# Patient Record
Sex: MALE | Race: WHITE | Employment: OTHER | URBAN - METROPOLITAN AREA
[De-identification: names, ages, dates, MRNs, and addresses within clinical notes are randomized per-mention and may not be internally consistent; named-entity substitution may affect disease eponyms.]

---

## 2018-04-30 ENCOUNTER — HOSPITAL ENCOUNTER (OUTPATIENT)
Age: 68
Discharge: HOME | End: 2018-04-30
Payer: MEDICARE

## 2018-04-30 LAB
ALBUMIN SERPL-MCNC: 4.7 G/DL (ref 3.5–5.2)
ALP BLD-CCNC: 52 U/L (ref 40–129)
ALT SERPL-CCNC: 41 U/L (ref 0–40)
ANION GAP SERPL CALCULATED.3IONS-SCNC: 18 MMOL/L (ref 7–16)
AST SERPL-CCNC: 30 U/L (ref 0–39)
BACTERIA: NORMAL /HPF
BASOPHILS ABSOLUTE: 0.06 E9/L (ref 0–0.2)
BASOPHILS RELATIVE PERCENT: 1.3 % (ref 0–2)
BILIRUB SERPL-MCNC: 0.8 MG/DL (ref 0–1.2)
BILIRUBIN URINE: NEGATIVE
BLOOD, URINE: ABNORMAL
BUN BLDV-MCNC: 17 MG/DL (ref 8–23)
CALCIUM SERPL-MCNC: 9.8 MG/DL (ref 8.6–10.2)
CHLORIDE BLD-SCNC: 102 MMOL/L (ref 98–107)
CHOLESTEROL, TOTAL: 241 MG/DL (ref 0–199)
CLARITY: CLEAR
CO2: 23 MMOL/L (ref 22–29)
COLOR: YELLOW
CREAT SERPL-MCNC: 1.4 MG/DL (ref 0.7–1.2)
CREATININE URINE: 151 MG/DL (ref 40–278)
EOSINOPHILS ABSOLUTE: 0.19 E9/L (ref 0.05–0.5)
EOSINOPHILS RELATIVE PERCENT: 4 % (ref 0–6)
GFR AFRICAN AMERICAN: >60
GFR NON-AFRICAN AMERICAN: 51 ML/MIN/1.73
GLUCOSE BLD-MCNC: 106 MG/DL (ref 74–109)
GLUCOSE URINE: NEGATIVE MG/DL
HBA1C MFR BLD: 5.8 % (ref 4.8–5.9)
HCT VFR BLD CALC: 51.9 % (ref 37–54)
HDLC SERPL-MCNC: 30 MG/DL
HEMOGLOBIN: 17.2 G/DL (ref 12.5–16.5)
IMMATURE GRANULOCYTES #: 0.01 E9/L
IMMATURE GRANULOCYTES %: 0.2 % (ref 0–5)
KETONES, URINE: NEGATIVE MG/DL
LDL CHOLESTEROL CALCULATED: 152 MG/DL (ref 0–99)
LEUKOCYTE ESTERASE, URINE: NEGATIVE
LYMPHOCYTES ABSOLUTE: 1.56 E9/L (ref 1.5–4)
LYMPHOCYTES RELATIVE PERCENT: 32.7 % (ref 20–42)
MCH RBC QN AUTO: 28.7 PG (ref 26–35)
MCHC RBC AUTO-ENTMCNC: 33.1 % (ref 32–34.5)
MCV RBC AUTO: 86.6 FL (ref 80–99.9)
MICROALBUMIN UR-MCNC: <12 MG/L
MICROALBUMIN/CREAT UR-RTO: ABNORMAL (ref 0–30)
MONOCYTES ABSOLUTE: 0.48 E9/L (ref 0.1–0.95)
MONOCYTES RELATIVE PERCENT: 10.1 % (ref 2–12)
NEUTROPHILS ABSOLUTE: 2.47 E9/L (ref 1.8–7.3)
NEUTROPHILS RELATIVE PERCENT: 51.7 % (ref 43–80)
NITRITE, URINE: NEGATIVE
PDW BLD-RTO: 13.7 FL (ref 11.5–15)
PH UA: 5.5 (ref 5–9)
PLATELET # BLD: 153 E9/L (ref 130–450)
PMV BLD AUTO: 9.2 FL (ref 7–12)
POTASSIUM SERPL-SCNC: 4.7 MMOL/L (ref 3.5–5)
PROSTATE SPECIFIC ANTIGEN: 2.6 NG/ML (ref 0–4)
PROTEIN UA: NEGATIVE MG/DL
RBC # BLD: 5.99 E12/L (ref 3.8–5.8)
RBC UA: NORMAL /HPF (ref 0–2)
RENAL EPITHELIAL, UA: NORMAL /HPF
SODIUM BLD-SCNC: 143 MMOL/L (ref 132–146)
SPECIFIC GRAVITY UA: 1.02 (ref 1–1.03)
TOTAL CK: 155 U/L (ref 20–200)
TOTAL PROTEIN: 8 G/DL (ref 6.4–8.3)
TRIGL SERPL-MCNC: 296 MG/DL (ref 0–149)
URIC ACID, SERUM: 11.2 MG/DL (ref 3.4–7)
UROBILINOGEN, URINE: 0.2 E.U./DL
VITAMIN D 25-HYDROXY: 21 NG/ML (ref 30–100)
VLDLC SERPL CALC-MCNC: 59 MG/DL
WBC # BLD: 4.8 E9/L (ref 4.5–11.5)
WBC UA: NORMAL /HPF (ref 0–5)

## 2018-04-30 PROCEDURE — 83036 HEMOGLOBIN GLYCOSYLATED A1C: CPT

## 2018-04-30 PROCEDURE — 82570 ASSAY OF URINE CREATININE: CPT

## 2018-04-30 PROCEDURE — 81001 URINALYSIS AUTO W/SCOPE: CPT

## 2018-04-30 PROCEDURE — 82306 VITAMIN D 25 HYDROXY: CPT

## 2018-04-30 PROCEDURE — 80053 COMPREHEN METABOLIC PANEL: CPT

## 2018-04-30 PROCEDURE — 36415 COLL VENOUS BLD VENIPUNCTURE: CPT

## 2018-04-30 PROCEDURE — 85025 COMPLETE CBC W/AUTO DIFF WBC: CPT

## 2018-04-30 PROCEDURE — 82550 ASSAY OF CK (CPK): CPT

## 2018-04-30 PROCEDURE — 82044 UR ALBUMIN SEMIQUANTITATIVE: CPT

## 2018-04-30 PROCEDURE — 80061 LIPID PANEL: CPT

## 2018-04-30 PROCEDURE — 84153 ASSAY OF PSA TOTAL: CPT

## 2018-04-30 PROCEDURE — 84550 ASSAY OF BLOOD/URIC ACID: CPT

## 2018-06-11 ENCOUNTER — HOSPITAL ENCOUNTER (OUTPATIENT)
Age: 68
Discharge: HOME | End: 2018-06-11
Payer: MEDICARE

## 2018-06-11 LAB
ALBUMIN SERPL-MCNC: 4.4 G/DL (ref 3.5–5.2)
ALP BLD-CCNC: 56 U/L (ref 40–129)
ALT SERPL-CCNC: 41 U/L (ref 0–40)
ANION GAP SERPL CALCULATED.3IONS-SCNC: 14 MMOL/L (ref 7–16)
AST SERPL-CCNC: 31 U/L (ref 0–39)
BILIRUB SERPL-MCNC: 0.6 MG/DL (ref 0–1.2)
BUN BLDV-MCNC: 15 MG/DL (ref 8–23)
CALCIUM SERPL-MCNC: 9.8 MG/DL (ref 8.6–10.2)
CHLORIDE BLD-SCNC: 105 MMOL/L (ref 98–107)
CHOLESTEROL, TOTAL: 160 MG/DL (ref 0–199)
CO2: 24 MMOL/L (ref 22–29)
CREAT SERPL-MCNC: 1.2 MG/DL (ref 0.7–1.2)
GFR AFRICAN AMERICAN: >60
GFR NON-AFRICAN AMERICAN: >60 ML/MIN/1.73
GLUCOSE BLD-MCNC: 129 MG/DL (ref 74–109)
HDLC SERPL-MCNC: 38 MG/DL
LDL CHOLESTEROL CALCULATED: 88 MG/DL (ref 0–99)
POTASSIUM SERPL-SCNC: 4.3 MMOL/L (ref 3.5–5)
SODIUM BLD-SCNC: 143 MMOL/L (ref 132–146)
TOTAL CK: 122 U/L (ref 20–200)
TOTAL PROTEIN: 7.5 G/DL (ref 6.4–8.3)
TRIGL SERPL-MCNC: 168 MG/DL (ref 0–149)
VLDLC SERPL CALC-MCNC: 34 MG/DL

## 2018-06-11 PROCEDURE — 88112 CYTOPATH CELL ENHANCE TECH: CPT

## 2018-06-11 PROCEDURE — 80053 COMPREHEN METABOLIC PANEL: CPT

## 2018-06-11 PROCEDURE — 36415 COLL VENOUS BLD VENIPUNCTURE: CPT

## 2018-06-11 PROCEDURE — 86803 HEPATITIS C AB TEST: CPT

## 2018-06-11 PROCEDURE — 82550 ASSAY OF CK (CPK): CPT

## 2018-06-11 PROCEDURE — 87522 HEPATITIS C REVRS TRNSCRPJ: CPT

## 2018-06-11 PROCEDURE — 80061 LIPID PANEL: CPT

## 2018-06-12 LAB — HEPATITIS C ANTIBODY INTERPRETATION: NORMAL

## 2018-06-13 ENCOUNTER — HOSPITAL ENCOUNTER (OUTPATIENT)
Age: 68
Discharge: HOME | End: 2018-06-15
Payer: MEDICARE

## 2018-06-13 PROCEDURE — 88112 CYTOPATH CELL ENHANCE TECH: CPT

## 2018-06-14 LAB
EER HCV RNA QNT PCR: NORMAL
HCV RNA QNT REAL-TIME PCR INTERP: NOT DETECTED
HCV RNA, QUANTITATIVE REAL TIME PCR: <1.2 LOG IU
HEPATITIS C RNA PCR QUANT: <15 IU/ML

## 2018-11-13 ENCOUNTER — TELEPHONE (OUTPATIENT)
Dept: PHARMACY | Facility: CLINIC | Age: 68
End: 2018-11-13

## 2018-11-13 NOTE — TELEPHONE ENCOUNTER
Reviewed and agree with PharmD candidate. Lilia Pepper, PharmD, [facility], R [facility] 99 Pharmacist  11/13/2018 4:22 PM    For Pharmacy Admin Tracking Only  PHSO: Yes  Total # of Interventions Recommended: 1  - New Order #: 1 New Medication Order Reason(s):  Adherence  - Refills Provided #: 0  - Maintenance Safety Lab Monitoring #: 1  - New Therapy Lab Monitoring #: 1  Total Interventions Accepted: 0  Time Spent (min): 15

## 2019-04-25 ENCOUNTER — HOSPITAL ENCOUNTER (OUTPATIENT)
Age: 69
Discharge: HOME | End: 2019-04-25
Payer: MEDICARE

## 2019-04-25 LAB
ALBUMIN SERPL-MCNC: 4.2 G/DL (ref 3.5–5.2)
ALP BLD-CCNC: 55 U/L (ref 40–129)
ALT SERPL-CCNC: 29 U/L (ref 0–40)
ANION GAP SERPL CALCULATED.3IONS-SCNC: 10 MMOL/L (ref 7–16)
AST SERPL-CCNC: 22 U/L (ref 0–39)
BACTERIA: ABNORMAL /HPF
BASOPHILS ABSOLUTE: 0.04 E9/L (ref 0–0.2)
BASOPHILS RELATIVE PERCENT: 0.8 % (ref 0–2)
BILIRUB SERPL-MCNC: 0.6 MG/DL (ref 0–1.2)
BILIRUBIN URINE: NEGATIVE
BLOOD, URINE: NEGATIVE
BUN BLDV-MCNC: 15 MG/DL (ref 8–23)
CALCIUM SERPL-MCNC: 9.6 MG/DL (ref 8.6–10.2)
CHLORIDE BLD-SCNC: 103 MMOL/L (ref 98–107)
CHOLESTEROL, TOTAL: 174 MG/DL (ref 0–199)
CLARITY: CLEAR
CO2: 26 MMOL/L (ref 22–29)
COLOR: YELLOW
CREAT SERPL-MCNC: 1.3 MG/DL (ref 0.7–1.2)
CREATININE URINE: 145 MG/DL (ref 40–278)
EOSINOPHILS ABSOLUTE: 0.29 E9/L (ref 0.05–0.5)
EOSINOPHILS RELATIVE PERCENT: 5.6 % (ref 0–6)
GFR AFRICAN AMERICAN: >60
GFR NON-AFRICAN AMERICAN: 55 ML/MIN/1.73
GLUCOSE BLD-MCNC: 124 MG/DL (ref 74–99)
GLUCOSE URINE: NEGATIVE MG/DL
HBA1C MFR BLD: 6 % (ref 4–5.6)
HCT VFR BLD CALC: 49.8 % (ref 37–54)
HDLC SERPL-MCNC: 35 MG/DL
HEMOGLOBIN: 17 G/DL (ref 12.5–16.5)
IMMATURE GRANULOCYTES #: 0.03 E9/L
IMMATURE GRANULOCYTES %: 0.6 % (ref 0–5)
KETONES, URINE: NEGATIVE MG/DL
LDL CHOLESTEROL CALCULATED: 97 MG/DL (ref 0–99)
LEUKOCYTE ESTERASE, URINE: ABNORMAL
LYMPHOCYTES ABSOLUTE: 1.63 E9/L (ref 1.5–4)
LYMPHOCYTES RELATIVE PERCENT: 31.7 % (ref 20–42)
MCH RBC QN AUTO: 29.6 PG (ref 26–35)
MCHC RBC AUTO-ENTMCNC: 34.1 % (ref 32–34.5)
MCV RBC AUTO: 86.6 FL (ref 80–99.9)
MICROALBUMIN UR-MCNC: <12 MG/L
MICROALBUMIN/CREAT UR-RTO: ABNORMAL (ref 0–30)
MONOCYTES ABSOLUTE: 0.52 E9/L (ref 0.1–0.95)
MONOCYTES RELATIVE PERCENT: 10.1 % (ref 2–12)
NEUTROPHILS ABSOLUTE: 2.64 E9/L (ref 1.8–7.3)
NEUTROPHILS RELATIVE PERCENT: 51.2 % (ref 43–80)
NITRITE, URINE: NEGATIVE
PDW BLD-RTO: 13.6 FL (ref 11.5–15)
PH UA: 5 (ref 5–9)
PLATELET # BLD: 156 E9/L (ref 130–450)
PMV BLD AUTO: 9 FL (ref 7–12)
POTASSIUM SERPL-SCNC: 4.6 MMOL/L (ref 3.5–5)
PROSTATE SPECIFIC ANTIGEN: 2.23 NG/ML (ref 0–4)
PROTEIN UA: NEGATIVE MG/DL
RBC # BLD: 5.75 E12/L (ref 3.8–5.8)
RBC UA: ABNORMAL /HPF (ref 0–2)
SODIUM BLD-SCNC: 139 MMOL/L (ref 132–146)
SPECIFIC GRAVITY UA: 1.02 (ref 1–1.03)
TOTAL CK: 80 U/L (ref 20–200)
TOTAL PROTEIN: 7.4 G/DL (ref 6.4–8.3)
TRIGL SERPL-MCNC: 208 MG/DL (ref 0–149)
URIC ACID, SERUM: 9.4 MG/DL (ref 3.4–7)
UROBILINOGEN, URINE: 0.2 E.U./DL
VITAMIN D 25-HYDROXY: 19 NG/ML (ref 30–100)
VLDLC SERPL CALC-MCNC: 42 MG/DL
WBC # BLD: 5.2 E9/L (ref 4.5–11.5)
WBC UA: ABNORMAL /HPF (ref 0–5)

## 2019-04-25 PROCEDURE — 36415 COLL VENOUS BLD VENIPUNCTURE: CPT

## 2019-04-25 PROCEDURE — 80053 COMPREHEN METABOLIC PANEL: CPT

## 2019-04-25 PROCEDURE — 81001 URINALYSIS AUTO W/SCOPE: CPT

## 2019-04-25 PROCEDURE — 85025 COMPLETE CBC W/AUTO DIFF WBC: CPT

## 2019-04-25 PROCEDURE — 82044 UR ALBUMIN SEMIQUANTITATIVE: CPT

## 2019-04-25 PROCEDURE — 82570 ASSAY OF URINE CREATININE: CPT

## 2019-04-25 PROCEDURE — 82306 VITAMIN D 25 HYDROXY: CPT

## 2019-04-25 PROCEDURE — 80061 LIPID PANEL: CPT

## 2019-04-25 PROCEDURE — 82550 ASSAY OF CK (CPK): CPT

## 2019-04-25 PROCEDURE — 84153 ASSAY OF PSA TOTAL: CPT

## 2019-04-25 PROCEDURE — 83036 HEMOGLOBIN GLYCOSYLATED A1C: CPT

## 2019-04-25 PROCEDURE — 84550 ASSAY OF BLOOD/URIC ACID: CPT

## 2019-05-22 ENCOUNTER — OFFICE VISIT (OUTPATIENT)
Dept: PRIMARY CARE CLINIC | Age: 69
End: 2019-05-22
Payer: MEDICARE

## 2019-05-22 VITALS
DIASTOLIC BLOOD PRESSURE: 80 MMHG | BODY MASS INDEX: 36.05 KG/M2 | OXYGEN SATURATION: 98 % | SYSTOLIC BLOOD PRESSURE: 138 MMHG | HEART RATE: 73 BPM | WEIGHT: 210 LBS

## 2019-05-22 DIAGNOSIS — F41.9 ANXIETY AND DEPRESSION: ICD-10-CM

## 2019-05-22 DIAGNOSIS — N42.9 PROSTATE DISORDER: ICD-10-CM

## 2019-05-22 DIAGNOSIS — D75.1 POLYCYTHEMIA: ICD-10-CM

## 2019-05-22 DIAGNOSIS — J44.9 CHRONIC OBSTRUCTIVE PULMONARY DISEASE, UNSPECIFIED COPD TYPE (HCC): ICD-10-CM

## 2019-05-22 DIAGNOSIS — G47.00 INSOMNIA, UNSPECIFIED TYPE: ICD-10-CM

## 2019-05-22 DIAGNOSIS — R31.21 ASYMPTOMATIC MICROSCOPIC HEMATURIA: ICD-10-CM

## 2019-05-22 DIAGNOSIS — K21.9 GASTROESOPHAGEAL REFLUX DISEASE, ESOPHAGITIS PRESENCE NOT SPECIFIED: ICD-10-CM

## 2019-05-22 DIAGNOSIS — Z00.00 HEALTH MAINTENANCE EXAMINATION: ICD-10-CM

## 2019-05-22 DIAGNOSIS — E55.9 VITAMIN D DEFICIENCY: ICD-10-CM

## 2019-05-22 DIAGNOSIS — M1A.9XX0 CHRONIC GOUT WITHOUT TOPHUS, UNSPECIFIED CAUSE, UNSPECIFIED SITE: ICD-10-CM

## 2019-05-22 DIAGNOSIS — E78.2 MIXED HYPERLIPIDEMIA: ICD-10-CM

## 2019-05-22 DIAGNOSIS — M51.37 DDD (DEGENERATIVE DISC DISEASE), LUMBOSACRAL: ICD-10-CM

## 2019-05-22 DIAGNOSIS — F32.A ANXIETY AND DEPRESSION: ICD-10-CM

## 2019-05-22 DIAGNOSIS — J44.9 OBSTRUCTIVE CHRONIC BRONCHITIS WITHOUT EXACERBATION (HCC): ICD-10-CM

## 2019-05-22 DIAGNOSIS — I25.10 CORONARY ARTERY DISEASE INVOLVING NATIVE CORONARY ARTERY OF NATIVE HEART WITHOUT ANGINA PECTORIS: Primary | ICD-10-CM

## 2019-05-22 DIAGNOSIS — Z72.0 TOBACCO ABUSE: ICD-10-CM

## 2019-05-22 DIAGNOSIS — N18.9 CHRONIC RENAL IMPAIRMENT, UNSPECIFIED CKD STAGE: ICD-10-CM

## 2019-05-22 DIAGNOSIS — K76.9 LIVER DISEASE: ICD-10-CM

## 2019-05-22 DIAGNOSIS — E11.65 TYPE 2 DIABETES MELLITUS WITH HYPERGLYCEMIA, WITHOUT LONG-TERM CURRENT USE OF INSULIN (HCC): ICD-10-CM

## 2019-05-22 DIAGNOSIS — I10 ESSENTIAL HYPERTENSION: ICD-10-CM

## 2019-05-22 PROBLEM — M51.379 DDD (DEGENERATIVE DISC DISEASE), LUMBOSACRAL: Status: ACTIVE | Noted: 2019-05-22

## 2019-05-22 PROBLEM — J44.89 OBSTRUCTIVE CHRONIC BRONCHITIS WITHOUT EXACERBATION: Status: ACTIVE | Noted: 2019-05-22

## 2019-05-22 PROCEDURE — 93000 ELECTROCARDIOGRAM COMPLETE: CPT | Performed by: FAMILY MEDICINE

## 2019-05-22 PROCEDURE — 99215 OFFICE O/P EST HI 40 MIN: CPT | Performed by: FAMILY MEDICINE

## 2019-05-22 RX ORDER — FAMOTIDINE 20 MG/1
20 TABLET, FILM COATED ORAL DAILY
Qty: 60 TABLET | Refills: 1 | Status: SHIPPED
Start: 2019-05-22 | End: 2019-05-22 | Stop reason: SDUPTHER

## 2019-05-22 RX ORDER — ZOLPIDEM TARTRATE 10 MG/1
10 TABLET ORAL NIGHTLY PRN
COMMUNITY
End: 2019-05-22

## 2019-05-22 RX ORDER — METOPROLOL SUCCINATE 25 MG/1
12.5 TABLET, EXTENDED RELEASE ORAL DAILY
Qty: 15 TABLET | Refills: 3 | Status: SHIPPED | OUTPATIENT
Start: 2019-05-22 | End: 2019-10-15 | Stop reason: SDUPTHER

## 2019-05-22 RX ORDER — COLCHICINE 0.6 MG/1
TABLET ORAL
Qty: 30 TABLET | Refills: 3 | Status: SHIPPED | OUTPATIENT
Start: 2019-05-22 | End: 2019-11-05 | Stop reason: SDUPTHER

## 2019-05-22 RX ORDER — ATORVASTATIN CALCIUM 20 MG/1
20 TABLET, FILM COATED ORAL DAILY
COMMUNITY
End: 2019-05-22 | Stop reason: SDUPTHER

## 2019-05-22 RX ORDER — ALBUTEROL SULFATE 90 UG/1
AEROSOL, METERED RESPIRATORY (INHALATION)
Qty: 1 INHALER | Refills: 3 | Status: SHIPPED | OUTPATIENT
Start: 2019-05-22 | End: 2020-01-21 | Stop reason: SDUPTHER

## 2019-05-22 RX ORDER — ATORVASTATIN CALCIUM 20 MG/1
20 TABLET, FILM COATED ORAL DAILY
Qty: 30 TABLET | Refills: 3 | Status: SHIPPED | OUTPATIENT
Start: 2019-05-22 | End: 2019-11-05

## 2019-05-22 RX ORDER — ERGOCALCIFEROL 1.25 MG/1
50000 CAPSULE ORAL WEEKLY
Qty: 4 CAPSULE | Refills: 3 | Status: SHIPPED | OUTPATIENT
Start: 2019-05-22 | End: 2020-01-21 | Stop reason: SDUPTHER

## 2019-05-22 RX ORDER — FLUOXETINE HYDROCHLORIDE 20 MG/1
20 CAPSULE ORAL DAILY
Qty: 30 CAPSULE | Refills: 3 | Status: SHIPPED | OUTPATIENT
Start: 2019-05-22 | End: 2019-10-15 | Stop reason: SDUPTHER

## 2019-05-22 RX ORDER — ALLOPURINOL 300 MG/1
300 TABLET ORAL DAILY
Qty: 30 TABLET | Refills: 3 | Status: SHIPPED | OUTPATIENT
Start: 2019-05-22 | End: 2019-11-05 | Stop reason: SDUPTHER

## 2019-05-22 RX ORDER — COLCHICINE 0.6 MG/1
TABLET ORAL
Qty: 10 TABLET | Refills: 0 | Status: SHIPPED
Start: 2019-05-22 | End: 2019-05-22

## 2019-05-22 RX ORDER — ALLOPURINOL 300 MG/1
300 TABLET ORAL DAILY
COMMUNITY
Start: 2015-10-15 | End: 2019-05-22 | Stop reason: SDUPTHER

## 2019-05-22 RX ORDER — FAMOTIDINE 20 MG/1
20 TABLET, FILM COATED ORAL DAILY
Qty: 30 TABLET | Refills: 3 | Status: SHIPPED | OUTPATIENT
Start: 2019-05-22 | End: 2019-10-15 | Stop reason: SDUPTHER

## 2019-05-22 NOTE — PROGRESS NOTES
19  Erle Clock : 1950 Sex: male  Age: 79 y.o. Chief Complaint   Patient presents with   [address]     would like referral        HPI  HPI:     He is here for follow-up. He has not been seen in over a year. Overall he feels well. Unfortunately despite his past history he is back to smoking. Counseled extensively on smoking cessation. Declines screening imaging PFTs or medications. AAA screen. Blood work reviewed. White blood cell count 5.2, hemoglobin 17.0, platelet count 947, glucose 124 creatinine 1.3 triglyceride 208 HDL 35 and LDL 97 LFTs normal urinalysis negative, hepatitis C screen negative uric acid went from 11.4-9.4 noncompliant without renal, hemoglobin A1c 6.0, PSA went from 2.6-2.2., CK 80    Review of Systems  ROS:  Const: Denies chills, fever, malaise and sweats. Eyes: Denies discharge, pain, redness and visual disturbance. ENMT: Denies earaches, other ear symptoms. Denies nasal or sinus symptoms other than stated  above. Denies mouth and tongue lesions and sore throat. CV: Denies chest discomfort, pain; diaphoresis, dizziness, edema, lightheadedness, orthopnea,  palpitations, syncope and near syncopal episode or any exertional symptoms  Resp: Denies cough, hemoptysis, pleuritic pain, SOB, sputum production and wheezing. GI: Denies abdominal pain, change in bowel habits, hematochezia, melena, nausea and vomiting. : Denies urinary symptoms including dysuria , urgency, frequency or hematuria. Musculo: Chronic low back pain with limited range of motion  Skin: Denies bruising and rash.   Neuro: Denies headache, numbness, stiff neck, tingling and focal weakness slurred speech or facial  droop  Hema/Lymph: Denies bleeding/bruising tendency and enlarged lymph nodes        Current Outpatient Medications:     colchicine (COLCRYS) 0.6 MG tablet, Two tablets by mouth x 1 at first sign of gout, then one tablet one hour later, Disp: 30 tablet, Rfl: 3   atorvastatin (LIPITOR) 20 MG tablet, Take 1 tablet by mouth daily, Disp: 30 tablet, Rfl: 3    allopurinol (ZYLOPRIM) 300 MG tablet, Take 1 tablet by mouth daily, Disp: 30 tablet, Rfl: 3    albuterol sulfate  (90 Base) MCG/ACT inhaler, 1-2 puffs q4-6hrs prn, Disp: 1 Inhaler, Rfl: 3    famotidine (PEPCID) 20 MG tablet, Take 1 tablet by mouth daily, Disp: 30 tablet, Rfl: 3    FLUoxetine (PROZAC) 20 MG capsule, Take 1 capsule by mouth daily, Disp: 30 capsule, Rfl: 3    metoprolol succinate (TOPROL XL) 25 MG extended release tablet, Take 0.5 tablets by mouth daily, Disp: 15 tablet, Rfl: 3    tiotropium (SPIRIVA) 18 MCG inhalation capsule, Inhale 1 capsule into the lungs daily, Disp: 30 capsule, Rfl: 3    vitamin D (ERGOCALCIFEROL) 44328 units CAPS capsule, Take 1 capsule by mouth once a week, Disp: 4 capsule, Rfl: 3    aspirin 81 MG EC tablet, Take 81 mg by mouth daily. , Disp: , Rfl:   No Known Allergies    Past Medical History:   Diagnosis Date    CAD (coronary artery disease)     COPD [facility]chronic obstructive pulmonary disease) ([facility] [address]     Hyperlipidemia     Hypertension      Past Surgical History:   Procedure Laterality Date    CORONARY ARTERY BYPASS GRAFT  06/09/11    ACB X 5- DR. ROBLEDO    DIAGNOSTIC CARDIAC CATH LAB PROCEDURE  06/08/11    DR. VÁSQUEZ    TONSILLECTOMY AND ADENOIDECTOMY       Family History   Problem Relation Age of Onset    Thyroid Disease Mother     Heart Attack Father 61    Hypertension Father     Hypertension Sister     Heart Surgery Sister     Cirrhosis Brother     Hypertension Sister     Heart Surgery Sister     Heart Surgery Sister     Hypertension Sister      Social History     Tobacco Use    Smoking status: Current Every Day Smoker     Packs/day: 1.00     Years: 45.00     Pack years: 45.00     Types: Cigarettes     Start date: 1/1/1970    Smokeless tobacco: Never Used   Substance Use Topics    Alcohol use: Yes     Comment: occassionally on holidays    Drug nondistended. No  abdominal masses. No palpable hepatosplenomegaly. Lymph: No palpable or visible regional lymphadenopathy. Musculoskeletal: no acute joint inflammation except chronic low back pain with limited range of motion. .  Skin: Dry and warm with no rash. Skin normal to inspection and palpation overall. Neuro: Alert and oriented. Affect: appropriate. Upper Extremities: 5/5 bilaterally. Lower Extremities:  5/5 bilaterally. Sensation intact to light touch. Reflexes: DTR's are symmetric and 2+ bilaterally. .  Cranial Nerves: Cranial nerves grossly intact. Assessment and Plan:   Diagnosis Orders   1. Coronary artery disease involving native coronary artery of native heart without angina pectoris  CBC Auto Differential    TSH WITH REFLEX TO FT4   2. Health maintenance examination  EKG 12 lead    EKG 12 lead    CBC Auto Differential    TSH WITH REFLEX TO FT4   3. Essential hypertension  metoprolol succinate (TOPROL XL) 25 MG extended release tablet    CBC Auto Differential    TSH WITH REFLEX TO FT4   4. Chronic obstructive pulmonary disease, unspecified COPD type (HCC)  CBC Auto Differential    TSH WITH REFLEX TO FT4    Uric Acid   5. Mixed hyperlipidemia  atorvastatin (LIPITOR) 20 MG tablet    CBC Auto Differential    Comprehensive Metabolic Panel    CK    Lipid Panel    TSH WITH REFLEX TO FT4   6. Liver disease  CBC Auto Differential    Comprehensive Metabolic Panel    TSH WITH REFLEX TO FT4   7. Asymptomatic microscopic hematuria  CBC Auto Differential    TSH WITH REFLEX TO FT4   8. Type 2 diabetes mellitus with hyperglycemia, without long-term current use of insulin (HCC)  CBC Auto Differential    Comprehensive Metabolic Panel    Hemoglobin A1C    TSH WITH REFLEX TO FT4    Urinalysis    Microalbumin / Creatinine Urine Ratio   9.  Chronic gout without tophus, unspecified cause, unspecified site  colchicine (COLCRYS) 0.6 MG tablet    allopurinol (ZYLOPRIM) 300 MG tablet    CBC Auto Differential Comprehensive Metabolic Panel    TSH WITH REFLEX TO FT4   10. DDD (degenerative disc disease), lumbosacral  CBC Auto Differential    TSH WITH REFLEX TO FT4   11. Insomnia, unspecified type  CBC Auto Differential    TSH WITH REFLEX TO FT4   12. Polycythemia  CBC Auto Differential    TSH WITH REFLEX TO FT4   13. Anxiety and depression  FLUoxetine (PROZAC) 20 MG capsule    CBC Auto Differential    TSH WITH REFLEX TO FT4   14. Gastroesophageal reflux disease, esophagitis presence not specified  famotidine (PEPCID) 20 MG tablet    CBC Auto Differential    TSH WITH REFLEX TO FT4   15. Vitamin D deficiency  vitamin D (ERGOCALCIFEROL) 16282 units CAPS capsule    CBC Auto Differential    TSH WITH REFLEX TO FT4    Vitamin D 25 Hydroxy   16. Obstructive chronic bronchitis without exacerbation ([facility] [address]     17. Chronic renal impairment, unspecified CKD stage     18. Tobacco abuse         No problem-specific Assessment & Plan notes found for this encounter. Hypertension  Care Plan:  Comments : Tolerating therapy. Risk of HTN reviewed. lifestyle modification emphasized. hyper and hypotensive precautions reviewed pulse parameters also  reviewed. Declines ACE inhibitor or ARB. Refill given    Other hyperlipidemia  Care Plan:  Comments : Crestor unaffordable, so started Lipitor 20 mg with Precautions over a year ago. Tolerating well. .SE's/Instructions/Risks/Benefits reviewed. If  ADR's, D/C and notify. The risks  and benefits of Vitamin D3 and Coenzyme Q-10 supplementation reviewed. risk of hyperlipidemia reviewed lifestyle modification reviewed. Liver disease, unspecified  Care Plan:  Comments : Counseled extensively. Differential reviewed, including serious etiologies. Likely fatty liver. Precautions reviewed. Lifestyle modification appropriate diet  and weight loss reviewed. Risks of even this leading to cirrhosis reviewed.   Other than basic monitoring on interested in other evaluation or treatment,  in-depth blood work, imaging or otherwise. Hepatitis C screen negative. LFTs normalize. Declines  further evaluation or treatment otherwise    Hematuria, unspecified  Care Plan:  Comments : Counseled extensively. Differential reviewed, including serious etiologies. Asymptomatic. Continue per Dr. Marilee Mckenzie . higher risk given  tobacco use reviewed. Normalized. Cytology neg. Encounter for general adult medical examination with bnormal  findings  Care Plan:  Comments : Health maintenance issues discussed at length 5/18. Encouraged yearly  Physicals. Atherosclerotic heart disease of native coronary artery without angina  pectoris  Care Plan:  Comments : Status post CABG. asx. encouraged fu w/ cardiologist, previously dr Bj Heck,  declines. currently off ACE inhibitor/ARB and declines at this time   Type 2 diabetes mellitus with hyperglycemia  Care Plan:  Comments :  extensively. watch BS closely ambulatory. Parameters given. Call if not  in range. ER if dangerous numbers. Macro and microvascular complications  reviewed. Importance of at least yearly eye exams and daily foot exams  reviewed. Risks and benefits of insulin sensitizers reviewed and he declines  now. A1c reasonably stable at 6.0     Gout, unspecified  Care Plan:  Comments : Low uric acid diet. Proper hydration. He has colcrys prn,2×1 at onset of  symptoms and repeat one hour later. . Not taking his allopurinol. Compliance on allopurinol reviewed. monitor uric acid. Chronic obstructive pulmonary disease, unspecified  Care Plan:  Comments : Asymptomatic. Declining further imaging, Pulmonary Function Tests or,  imaging incl LDCT despite counseling or referral at this time. Currently on  Spiriva and p.r.n. Proair which he rarely needs although a little more in the fall. Intervertebral disc disorders with myelopathy, lumbar region  Care Plan:   Status post injections through all points, then surgery with  discectomy/laminectomy through Dr. Jim Crane.  Continue per them. h/o PT    Vitamin D deficiency, unspecified  Care Plan:  Comments : Resume prescription vitamin D,, continue monitoring    Disorder of prostate, unspecified  Care Plan:  Comments : Counseled extensively. Differential reviewed, including serious etiologies. rising  PSA is trending down from 2.6-2.2. Continue per Dr. Jus Kaur     insomnia, unspecified  Care Plan:  Comments : No longer having issues with this and no longer on Ambien. Counseled. Secondary polycythemia  Care Plan:  Comments : Likely related to smoking, other differential reviewed. Monitor. Declines further  eval/tx. Dysthymic disorder  Care Plan:  Comments : Stable. Continue current therapy. r/b meds reviewed. Gastro-esophageal reflux disease without esophagitis  Care Plan:  Comments : Asymptomatic as long as taking therapy. Declines further testing and  understands risk of masking underlying etiologies. Risks of meds also reviewed  including calcium malabsorption . CRI-  Counseled. Proper hydration. Avoid nephrotoxic agents monitor. Declines imaging or referral    Tobacco abuse-counseled extensively on importance of abstinence    Plan as above. Compliance emphasized. Prognosis is guarded given his comorbidities but overall asymptomatic at this time. Refills given. Plan physical next visit. Blood work prior. Follow-up 3 months sooner when necessary      Over 40 minutes spent with the patient in reviewing records, reviewing with patient/family,    No flowsheet data found. Plan as above. Counseled extensively and differential diagnoses relevant to above were reviewed, including serious etiologies. Side effects and interactions of medications were reviewed. As long as symptoms steadily improve/resolve, and medical conditions follow the expected course, FU as below, sooner PRN.     Return in about 3 months (around 8/22/2019), or if symptoms worsen or fail to improve, for Review BW, Med Review/Refill(s), physical.    Signs and

## 2019-06-21 PROBLEM — Z00.00 HEALTH MAINTENANCE EXAMINATION: Status: RESOLVED | Noted: 2019-05-22 | Resolved: 2019-06-21

## 2019-07-01 ENCOUNTER — TELEPHONE (OUTPATIENT)
Dept: PHARMACY | Facility: CLINIC | Age: 69
End: 2019-07-01

## 2019-07-05 NOTE — TELEPHONE ENCOUNTER
CLINICAL PHARMACY CONSULT: MED RECONCILIATION/REVIEW ADDENDUM    For Pharmacy Admin Tracking Only    PHSO: Yes  Total # of Interventions Recommended: 1  - New Order #: 0 New Medication Order Reason(s):  Adherence  - Maintenance Safety Lab Monitoring #: 1  Recommended intervention potential cost savings: 1  Time Spent (min): [address]

## 2019-09-19 RX ORDER — ROSUVASTATIN CALCIUM 10 MG/1
10 TABLET, COATED ORAL DAILY
COMMUNITY
End: 2019-09-19 | Stop reason: SDUPTHER

## 2019-09-19 RX ORDER — ROSUVASTATIN CALCIUM 10 MG/1
10 TABLET, COATED ORAL DAILY
Qty: 30 TABLET | Refills: 1 | Status: SHIPPED | OUTPATIENT
Start: 2019-09-19 | End: 2019-11-18 | Stop reason: SDUPTHER

## 2019-10-15 DIAGNOSIS — K21.9 GASTROESOPHAGEAL REFLUX DISEASE, ESOPHAGITIS PRESENCE NOT SPECIFIED: ICD-10-CM

## 2019-10-15 DIAGNOSIS — I10 ESSENTIAL HYPERTENSION: ICD-10-CM

## 2019-10-15 DIAGNOSIS — F32.A ANXIETY AND DEPRESSION: ICD-10-CM

## 2019-10-15 DIAGNOSIS — F41.9 ANXIETY AND DEPRESSION: ICD-10-CM

## 2019-10-15 RX ORDER — METOPROLOL SUCCINATE 25 MG/1
12.5 TABLET, EXTENDED RELEASE ORAL DAILY
Qty: 15 TABLET | Refills: 1 | Status: SHIPPED | OUTPATIENT
Start: 2019-10-15 | End: 2019-11-18 | Stop reason: SDUPTHER

## 2019-10-15 RX ORDER — FLUOXETINE HYDROCHLORIDE 20 MG/1
20 CAPSULE ORAL DAILY
Qty: 30 CAPSULE | Refills: 1 | Status: SHIPPED | OUTPATIENT
Start: 2019-10-15 | End: 2019-11-18 | Stop reason: SDUPTHER

## 2019-10-15 RX ORDER — FAMOTIDINE 20 MG/1
20 TABLET, FILM COATED ORAL DAILY
Qty: 30 TABLET | Refills: 1 | Status: SHIPPED | OUTPATIENT
Start: 2019-10-15 | End: 2019-11-18 | Stop reason: SDUPTHER

## 2019-11-05 DIAGNOSIS — M1A.9XX0 CHRONIC GOUT WITHOUT TOPHUS, UNSPECIFIED CAUSE, UNSPECIFIED SITE: ICD-10-CM

## 2019-11-05 RX ORDER — ALLOPURINOL 300 MG/1
300 TABLET ORAL DAILY
Qty: 30 TABLET | Refills: 0 | Status: SHIPPED | OUTPATIENT
Start: 2019-11-05 | End: 2020-01-21 | Stop reason: SDUPTHER

## 2019-11-05 RX ORDER — COLCHICINE 0.6 MG/1
TABLET ORAL
Qty: 30 TABLET | Refills: 0 | Status: SHIPPED | OUTPATIENT
Start: 2019-11-05 | End: 2020-01-21 | Stop reason: SDUPTHER

## 2019-11-18 DIAGNOSIS — I10 ESSENTIAL HYPERTENSION: ICD-10-CM

## 2019-11-18 DIAGNOSIS — F41.9 ANXIETY AND DEPRESSION: ICD-10-CM

## 2019-11-18 DIAGNOSIS — K21.9 GASTROESOPHAGEAL REFLUX DISEASE, ESOPHAGITIS PRESENCE NOT SPECIFIED: ICD-10-CM

## 2019-11-18 DIAGNOSIS — F32.A ANXIETY AND DEPRESSION: ICD-10-CM

## 2019-11-18 RX ORDER — FAMOTIDINE 20 MG/1
20 TABLET, FILM COATED ORAL DAILY
Qty: 30 TABLET | Refills: 1 | Status: SHIPPED | OUTPATIENT
Start: 2019-11-18 | End: 2020-01-21 | Stop reason: SDUPTHER

## 2019-11-18 RX ORDER — ROSUVASTATIN CALCIUM 10 MG/1
10 TABLET, COATED ORAL DAILY
Qty: 30 TABLET | Refills: 1 | Status: SHIPPED | OUTPATIENT
Start: 2019-11-18 | End: 2020-01-21 | Stop reason: SDUPTHER

## 2019-11-18 RX ORDER — METOPROLOL SUCCINATE 25 MG/1
12.5 TABLET, EXTENDED RELEASE ORAL DAILY
Qty: 15 TABLET | Refills: 1 | Status: SHIPPED | OUTPATIENT
Start: 2019-11-18 | End: 2020-01-21 | Stop reason: SDUPTHER

## 2019-11-18 RX ORDER — FLUOXETINE HYDROCHLORIDE 20 MG/1
20 CAPSULE ORAL DAILY
Qty: 30 CAPSULE | Refills: 1 | Status: SHIPPED | OUTPATIENT
Start: 2019-11-18 | End: 2020-01-21 | Stop reason: SDUPTHER

## 2020-01-13 ENCOUNTER — HOSPITAL ENCOUNTER (OUTPATIENT)
Age: 70
Discharge: HOME | End: 2020-01-13
Payer: MEDICARE

## 2020-01-13 LAB
ALBUMIN SERPL-MCNC: 4.3 G/DL (ref 3.5–5.2)
ALP BLD-CCNC: 58 U/L (ref 40–129)
ALT SERPL-CCNC: 34 U/L (ref 0–40)
ANION GAP SERPL CALCULATED.3IONS-SCNC: 16 MMOL/L (ref 7–16)
AST SERPL-CCNC: 23 U/L (ref 0–39)
BACTERIA: ABNORMAL /HPF
BASOPHILS ABSOLUTE: 0.06 E9/L (ref 0–0.2)
BASOPHILS RELATIVE PERCENT: 1.2 % (ref 0–2)
BILIRUB SERPL-MCNC: 0.6 MG/DL (ref 0–1.2)
BILIRUBIN URINE: NEGATIVE
BLOOD, URINE: NORMAL
BUN BLDV-MCNC: 17 MG/DL (ref 8–23)
CALCIUM SERPL-MCNC: 9.3 MG/DL (ref 8.6–10.2)
CHLORIDE BLD-SCNC: 103 MMOL/L (ref 98–107)
CHOLESTEROL, TOTAL: 154 MG/DL (ref 0–199)
CLARITY: CLEAR
CO2: 25 MMOL/L (ref 22–29)
COLOR: YELLOW
CREAT SERPL-MCNC: 1.2 MG/DL (ref 0.7–1.2)
CREATININE URINE: 133 MG/DL (ref 40–278)
EOSINOPHILS ABSOLUTE: 0.32 E9/L (ref 0.05–0.5)
EOSINOPHILS RELATIVE PERCENT: 6.6 % (ref 0–6)
GFR AFRICAN AMERICAN: >60
GFR NON-AFRICAN AMERICAN: >60 ML/MIN/1.73
GLUCOSE BLD-MCNC: 113 MG/DL (ref 74–99)
GLUCOSE URINE: NEGATIVE MG/DL
HBA1C MFR BLD: 5.7 % (ref 4–5.6)
HCT VFR BLD CALC: 48.8 % (ref 37–54)
HDLC SERPL-MCNC: 34 MG/DL
HEMOGLOBIN: 15.9 G/DL (ref 12.5–16.5)
IMMATURE GRANULOCYTES #: 0.02 E9/L
IMMATURE GRANULOCYTES %: 0.4 % (ref 0–5)
KETONES, URINE: NEGATIVE MG/DL
LDL CHOLESTEROL CALCULATED: 81 MG/DL (ref 0–99)
LEUKOCYTE ESTERASE, URINE: NEGATIVE
LYMPHOCYTES ABSOLUTE: 1.72 E9/L (ref 1.5–4)
LYMPHOCYTES RELATIVE PERCENT: 35.7 % (ref 20–42)
MCH RBC QN AUTO: 29 PG (ref 26–35)
MCHC RBC AUTO-ENTMCNC: 32.6 % (ref 32–34.5)
MCV RBC AUTO: 88.9 FL (ref 80–99.9)
MICROALBUMIN UR-MCNC: 17.1 MG/L
MICROALBUMIN/CREAT UR-RTO: 12.9 (ref 0–30)
MONOCYTES ABSOLUTE: 0.43 E9/L (ref 0.1–0.95)
MONOCYTES RELATIVE PERCENT: 8.9 % (ref 2–12)
NEUTROPHILS ABSOLUTE: 2.27 E9/L (ref 1.8–7.3)
NEUTROPHILS RELATIVE PERCENT: 47.2 % (ref 43–80)
NITRITE, URINE: NEGATIVE
PDW BLD-RTO: 14 FL (ref 11.5–15)
PH UA: 5.5 (ref 5–9)
PLATELET # BLD: 137 E9/L (ref 130–450)
PMV BLD AUTO: 9.6 FL (ref 7–12)
POTASSIUM SERPL-SCNC: 4.6 MMOL/L (ref 3.5–5)
PROTEIN UA: NEGATIVE MG/DL
RBC # BLD: 5.49 E12/L (ref 3.8–5.8)
RBC UA: ABNORMAL /HPF (ref 0–2)
SODIUM BLD-SCNC: 144 MMOL/L (ref 132–146)
SPECIFIC GRAVITY UA: 1.02 (ref 1–1.03)
TOTAL CK: 107 U/L (ref 20–200)
TOTAL PROTEIN: 7.4 G/DL (ref 6.4–8.3)
TRIGL SERPL-MCNC: 193 MG/DL (ref 0–149)
TSH SERPL DL<=0.05 MIU/L-ACNC: 2.76 UIU/ML (ref 0.27–4.2)
URIC ACID, SERUM: 9.3 MG/DL (ref 3.4–7)
UROBILINOGEN, URINE: 0.2 E.U./DL
VITAMIN D 25-HYDROXY: 30 NG/ML (ref 30–100)
VLDLC SERPL CALC-MCNC: 39 MG/DL
WBC # BLD: 4.8 E9/L (ref 4.5–11.5)
WBC UA: ABNORMAL /HPF (ref 0–5)

## 2020-01-13 PROCEDURE — 82550 ASSAY OF CK (CPK): CPT

## 2020-01-13 PROCEDURE — 36415 COLL VENOUS BLD VENIPUNCTURE: CPT

## 2020-01-13 PROCEDURE — 82044 UR ALBUMIN SEMIQUANTITATIVE: CPT

## 2020-01-13 PROCEDURE — 81001 URINALYSIS AUTO W/SCOPE: CPT

## 2020-01-13 PROCEDURE — 84443 ASSAY THYROID STIM HORMONE: CPT

## 2020-01-13 PROCEDURE — 82570 ASSAY OF URINE CREATININE: CPT

## 2020-01-13 PROCEDURE — 80053 COMPREHEN METABOLIC PANEL: CPT

## 2020-01-13 PROCEDURE — 82306 VITAMIN D 25 HYDROXY: CPT

## 2020-01-13 PROCEDURE — 85025 COMPLETE CBC W/AUTO DIFF WBC: CPT

## 2020-01-13 PROCEDURE — 84550 ASSAY OF BLOOD/URIC ACID: CPT

## 2020-01-13 PROCEDURE — 83036 HEMOGLOBIN GLYCOSYLATED A1C: CPT

## 2020-01-13 PROCEDURE — 80061 LIPID PANEL: CPT

## 2020-01-21 ENCOUNTER — OFFICE VISIT (OUTPATIENT)
Dept: PRIMARY CARE CLINIC | Age: 70
End: 2020-01-21
Payer: MEDICARE

## 2020-01-21 VITALS
HEART RATE: 78 BPM | OXYGEN SATURATION: 96 % | SYSTOLIC BLOOD PRESSURE: 140 MMHG | HEIGHT: 64 IN | BODY MASS INDEX: 35.48 KG/M2 | TEMPERATURE: 97.8 F | DIASTOLIC BLOOD PRESSURE: 78 MMHG | WEIGHT: 207.8 LBS

## 2020-01-21 PROBLEM — G47.00 INSOMNIA: Status: ACTIVE | Noted: 2020-01-21

## 2020-01-21 PROBLEM — H25.9 AGE-RELATED CATARACT OF LEFT EYE: Status: ACTIVE | Noted: 2020-01-21

## 2020-01-21 PROCEDURE — 90732 PPSV23 VACC 2 YRS+ SUBQ/IM: CPT | Performed by: FAMILY MEDICINE

## 2020-01-21 PROCEDURE — G0009 ADMIN PNEUMOCOCCAL VACCINE: HCPCS | Performed by: FAMILY MEDICINE

## 2020-01-21 PROCEDURE — G8482 FLU IMMUNIZE ORDER/ADMIN: HCPCS | Performed by: FAMILY MEDICINE

## 2020-01-21 PROCEDURE — G8427 DOCREV CUR MEDS BY ELIG CLIN: HCPCS | Performed by: FAMILY MEDICINE

## 2020-01-21 PROCEDURE — 4004F PT TOBACCO SCREEN RCVD TLK: CPT | Performed by: FAMILY MEDICINE

## 2020-01-21 PROCEDURE — 3023F SPIROM DOC REV: CPT | Performed by: FAMILY MEDICINE

## 2020-01-21 PROCEDURE — G8926 SPIRO NO PERF OR DOC: HCPCS | Performed by: FAMILY MEDICINE

## 2020-01-21 PROCEDURE — 1123F ACP DISCUSS/DSCN MKR DOCD: CPT | Performed by: FAMILY MEDICINE

## 2020-01-21 PROCEDURE — 3044F HG A1C LEVEL LT 7.0%: CPT | Performed by: FAMILY MEDICINE

## 2020-01-21 PROCEDURE — G8417 CALC BMI ABV UP PARAM F/U: HCPCS | Performed by: FAMILY MEDICINE

## 2020-01-21 PROCEDURE — G0008 ADMIN INFLUENZA VIRUS VAC: HCPCS | Performed by: FAMILY MEDICINE

## 2020-01-21 PROCEDURE — 90653 IIV ADJUVANT VACCINE IM: CPT | Performed by: FAMILY MEDICINE

## 2020-01-21 PROCEDURE — 4040F PNEUMOC VAC/ADMIN/RCVD: CPT | Performed by: FAMILY MEDICINE

## 2020-01-21 PROCEDURE — 3017F COLORECTAL CA SCREEN DOC REV: CPT | Performed by: FAMILY MEDICINE

## 2020-01-21 PROCEDURE — 2022F DILAT RTA XM EVC RTNOPTHY: CPT | Performed by: FAMILY MEDICINE

## 2020-01-21 PROCEDURE — 99215 OFFICE O/P EST HI 40 MIN: CPT | Performed by: FAMILY MEDICINE

## 2020-01-21 RX ORDER — COLCHICINE 0.6 MG/1
TABLET ORAL
Qty: 30 TABLET | Refills: 6 | Status: SHIPPED | OUTPATIENT
Start: 2020-01-21 | End: 2020-07-27 | Stop reason: SDUPTHER

## 2020-01-21 RX ORDER — ROSUVASTATIN CALCIUM 10 MG/1
10 TABLET, COATED ORAL DAILY
Qty: 30 TABLET | Refills: 6 | Status: SHIPPED | OUTPATIENT
Start: 2020-01-21 | End: 2020-06-17 | Stop reason: SDUPTHER

## 2020-01-21 RX ORDER — ERGOCALCIFEROL 1.25 MG/1
50000 CAPSULE ORAL WEEKLY
Qty: 4 CAPSULE | Refills: 6 | Status: SHIPPED | OUTPATIENT
Start: 2020-01-21 | End: 2020-07-27 | Stop reason: SDUPTHER

## 2020-01-21 RX ORDER — ALLOPURINOL 300 MG/1
300 TABLET ORAL DAILY
Qty: 30 TABLET | Refills: 6 | Status: SHIPPED | OUTPATIENT
Start: 2020-01-21 | End: 2020-07-27 | Stop reason: SDUPTHER

## 2020-01-21 RX ORDER — ALBUTEROL SULFATE 90 UG/1
AEROSOL, METERED RESPIRATORY (INHALATION)
Qty: 1 INHALER | Refills: 6 | Status: SHIPPED | OUTPATIENT
Start: 2020-01-21 | End: 2020-07-27 | Stop reason: SDUPTHER

## 2020-01-21 RX ORDER — FLUOXETINE HYDROCHLORIDE 20 MG/1
20 CAPSULE ORAL DAILY
Qty: 30 CAPSULE | Refills: 6 | Status: SHIPPED | OUTPATIENT
Start: 2020-01-21 | End: 2020-02-10 | Stop reason: SDUPTHER

## 2020-01-21 RX ORDER — FAMOTIDINE 20 MG/1
20 TABLET, FILM COATED ORAL DAILY
Qty: 30 TABLET | Refills: 6 | Status: SHIPPED | OUTPATIENT
Start: 2020-01-21 | End: 2020-07-27 | Stop reason: SDUPTHER

## 2020-01-21 RX ORDER — METOPROLOL SUCCINATE 25 MG/1
12.5 TABLET, EXTENDED RELEASE ORAL DAILY
Qty: 15 TABLET | Refills: 6 | Status: SHIPPED | OUTPATIENT
Start: 2020-01-21 | End: 2020-07-27 | Stop reason: SDUPTHER

## 2020-01-21 NOTE — PATIENT INSTRUCTIONS
yourself. Visit the VAERS website at www.vaers. hhs.gov or call 1-727.595.6438. VAERS is only for reporting reactions, and VAERS staff do not give medical advice. The National Vaccine Injury Compensation Program  The National Vaccine Injury Compensation Program (VICP) is a federal program that was created to compensate people who may have been injured by certain vaccines. Visit the VICP website at www.hrsa.gov/vaccinecompensation or call 4-971.595.5461 to learn about the program and about filing a claim. There is a time limit to file a claim for compensation. How can I learn more? · Ask your healthcare provider. · Call your local or state health department. · Contact the Centers for Disease Control and Prevention (CDC):  ? Call 9-888.847.7933 (0-687-YAM-INFO) or  ? Visit CDC's website at www.cdc.gov/flu  Vaccine Information Statement (Interim)  Inactivated Influenza Vaccine  8/15/2019  42 BONNY Cobian 283OL-22  Department of Health and Human Services  Centers for Disease Control and Prevention  Many Vaccine Information Statements are available in [place] and other languages. See www.immunize.org/vis. Muchas hojas de información sobre vacunas están disponibles en español y en otros idiomas. Visite www.immunize.org/vis. Care instructions adapted under license by [facility] ([facility]). If you have questions about a medical condition or this instruction, always ask your healthcare professional. [place] [address] any warranty or liability for your use of this information. Patient Education        Pneumococcal Polysaccharide Vaccine: What You Need to Know  Why get vaccinated? Vaccination can protect older adults (and some children and younger adults) from pneumococcal disease. Pneumococcal disease is caused by bacteria that can spread from person to person through close contact.  It can cause ear infections, and it can also lead to more serious infections of the:  · Lungs (pneumonia),  · Blood (bacteremia), and  · Covering of the brain and spinal cord (meningitis). Meningitis can cause deafness and brain damage, and it can be fatal.  Anyone can get pneumococcal disease, but children under 3years of age, people with certain medical conditions, adults over 72years of age, and cigarette smokers are at the highest risk. About 18,000 older adults die each year from pneumococcal disease in the [place]. Treatment of pneumococcal infections with penicillin and other drugs used to be more effective. But some strains of the disease have become resistant to these drugs. This makes prevention of the disease, through vaccination, even more important. Pneumococcal polysaccharide vaccine (PPSV23)  Pneumococcal polysaccharide vaccine (PPSV23) protects against 23 types of pneumococcal bacteria. It will not prevent all pneumococcal disease. PPSV23 is recommended for:  · All adults 72years of age and older,  · Anyone 2 through 59years of age with certain long-term health problems,  · Anyone 2 through 59years of age with a weakened immune system,  · Adults 23 through 59years of age who smoke cigarettes or have asthma. Most people need only one dose of PPSV. A second dose is recommended for certain high-risk groups. People 72 and older should get a dose even if they have gotten one or more doses of the vaccine before they turned 65. Your healthcare provider can give you more information about these recommendations. Most healthy adults develop protection within 2 to 3 weeks of getting the shot. Some people should not get this vaccine  · Anyone who has had a life-threatening allergic reaction to PPSV should not get another dose. · Anyone who has a severe allergy to any component of PPSV should not receive it. Tell your provider if you have any severe allergies. · Anyone who is moderately or severely ill when the shot is scheduled may be asked to wait until they recover before getting the vaccine.  Someone with a mild illness can usually be vaccinated. · Children less than 3years of age should not receive this vaccine. · There is no evidence that PPSV is harmful to either a pregnant woman or to her fetus. However, as a precaution, women who need the vaccine should be vaccinated before becoming pregnant, if possible. Risks of a vaccine reaction  With any medicine, including vaccines, there is a chance of side effects. These are usually mild and go away on their own, but serious reactions are also possible. About half of people who get PPSV have mild side effects, such as redness or pain where the shot is given, which go away within about two days. Less than 1 out of 100 people develop a fever, muscle aches, or more severe local reactions. Problems that could happen after any vaccine:  · People sometimes faint after a medical procedure, including vaccination. Sitting or lying down for about 15 minutes can help prevent fainting, and injuries caused by a fall. Tell your doctor if you feel dizzy, or have vision changes or ringing in the ears. · Some people get severe pain in the shoulder and have difficulty moving the arm where a shot was given. This happens very rarely. · Any medication can cause a severe allergic reaction. Such reactions from a vaccine are very rare, estimated at about 1 in a million doses, and would happen within a few minutes to a few hours after the vaccination. As with any medicine, there is a very remote chance of a vaccine causing a serious injury or death. The safety of vaccines is always being monitored. For more information, visit: www.cdc.gov/vaccinesafety/  What if there is a serious reaction? What should I look for? Look for anything that concerns you, such as signs of a severe allergic reaction, very high fever, or unusual behavior.   Signs of a severe allergic reaction can include hives, swelling of the face and throat, difficulty breathing, a fast heartbeat, dizziness, and weakness. These would usually start a few minutes to a few hours after the vaccination. What should I do? If you think it is a severe allergic reaction or other emergency that can't wait, call 9-1-1 or get to the nearest hospital. Otherwise, call your doctor. Afterward, the reaction should be reported to the Vaccine Adverse Event Reporting System (VAERS). Your doctor might file this report, or you can do it yourself through the VAERS web site at www.vaers. [facility].gov, or by calling 1-907.694.6918. VAERS does not give medical advice. How can I learn more? · Ask your doctor. He or she can give you the vaccine package insert or suggest other sources of information. · Call your local or state health department. · Contact the Centers for Disease Control and Prevention (CDC):  ? Call 3-632.102.3123 (1-800-CDC-INFO) or  ? Visit CDC's website at www.cdc.gov/vaccines  Vaccine Information Statement  PPSV Vaccine  (04/24/2015)  Department of Health and Human Services  Centers for Disease Control and Prevention  Many Vaccine Information Statements are available in [place] and other languages. See www.immunize.org/vis. Hojas de información Sobre Vacunas están disponibles en español y en muchos otros idiomas. Visite [place].si. Care instructions adapted under license by [facility] ([facility]). If you have questions about a medical condition or this instruction, always ask your healthcare professional. [place] [address] any warranty or liability for your use of this information.

## 2020-01-21 NOTE — PROGRESS NOTES
Cirrhosis Brother     Hypertension Sister     Heart Surgery Sister     Heart Surgery Sister     Hypertension Sister      Social History     Tobacco Use    Smoking status: Current Every Day Smoker     Packs/day: 1.00     Years: 45.00     Pack years: 45.00     Types: Cigarettes     Start date: 1970    Smokeless tobacco: Never Used   Substance Use Topics    Alcohol use: Yes     Comment: occassionally on holidays    Drug use: No      Social History     Patient does not qualify to have social determinant information on file (likely too young). 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 FH:    Father:     age 61 - MI - hypertension. Mother:     age 66 - post-op complications - thyroid, reflux - post op after hip fx (septic). Brother -  52's cirrhosis from alcoholism    Brother (twin identical) htn (he follows with physicians regularly)    Sisters x 3 - all with HTN and 1 with CABG age 71 (stent 76)    Reviewed, no changes. SH:    Marital: Legal Status: . Previously Worked as , asbestos exposure, follows with work physicians    Personal Habits: Cigarette Use: Former Cigarette Smoker - quit . Alcohol: Denies alcohol use.       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 Mother:   age 66 - post-op complications - thyroid, reflux - post op after hip fx (septic). Brother -  52's cirrhosis from alcoholism  Brother (twin identical) htn (he follows with physicians regularly)  Sisters x 3 - all with HTN and 1 with CABG age 71 (stent 76)                  Vitals:    20 1025   BP: (!) 140/78   Pulse: 78   Temp: 97.8 °F (36.6 °C)   SpO2: 96%   Weight: 207 lb 12.8 oz (94.3 kg)   Height: 5' 4\" (1.626 m)      Wt Readings from Last 3 Encounters:   20 207 lb 12.8 oz (94.3 kg)   19 210 lb (95.3 kg)   06/24/15 212 lb (96.2 kg)        Physical Exam  Exam:  Const: Appears comfortable. No signs of acute distress present. Head/Face: Atraumatic on inspection. Eyes: EOMI in both eyes. No discharge from the eyes. PERRL. Sclerae clear. ENMT: Auditory canals normal. Tympanic membranes: intact and translucent. External nose WNL. Nasal mucosa is clear. Oropharynx: No erythema or exudate. Posterior pharynx is normal.  Neck: Supple. Palpation reveals no adenopathy. No masses appreciated. No JVD. Carotids: no  bruits. Resp: Respirations are unlabored. Clear to auscultation. No rales, rhonchi or wheezes appreciated  over the lungs bilaterally. CV: Rate is regular. Rhythm is regular. No gallop or rubs. No heart murmur appreciated. Extremities: No clubbing, cyanosis, or edema. No calf inflammation or tenderness. Abdomen: Bowel sounds are normoactive. Abdomen is soft, nontender, and nondistended. No  abdominal masses. No palpable hepatosplenomegaly. Lymph: No palpable or visible regional lymphadenopathy. Musculoskeletal: no acute joint inflammation except chronic low back pain with limited range of motion. .  Skin: Dry and warm with no rash. Skin normal to inspection and palpation overall. Neuro: Alert and oriented. Affect: appropriate. Upper Extremities: 5/5 bilaterally.  Lower age-related cataract type  External Referral To Ophthalmology   20. Health maintenance examination  Pneumococcal polysaccharide vaccine 23-valent greater than or equal to 1yo subcutaneous/IM    INFLUENZA, TRIV, INACTIVATED, SUBUNIT, ADJUVANTED, 65 YRS AND OLDER, IM, PREFILL SYR, 0.5ML (FLUAD TRIV)    POCT Fecal Immunochemical Test (FIT)       Age-related cataract of left eye  Refer eye care     Hypertension  Care Plan:  Comments : His wife states he has significant whitecoat hypertension, states excellent at home. Tolerating therapy. Risk of HTN reviewed. lifestyle modification emphasized. hyper and hypotensive precautions reviewed pulse parameters also  reviewed. Declines ACE inhibitor or ARB. Refill given    Other hyperlipidemia  Care Plan:  Comments : Although at one point when on Lipitor because Crestor unaffordable he has been taking Crestor now for over 6 months his wife states and doing well. Goals reviewed. Declines change in therapy. Tolerating well. .SE's/Instructions/Risks/Benefits reviewed. If  ADR's, D/C and notify. The risks  and benefits of Vitamin D3 and Coenzyme Q-10 supplementation reviewed. risk of hyperlipidemia reviewed lifestyle modification reviewed. Liver disease, unspecified  Care Plan:  Comments : Counseled extensively. Differential reviewed, including serious etiologies. Likely fatty liver. Precautions reviewed. Lifestyle modification appropriate diet  and weight loss reviewed. Risks of even this leading to cirrhosis reviewed. Other than basic monitoring on interested in other evaluation or treatment,  in-depth blood work, imaging or otherwise. Hepatitis C screen negative. LFTs normalize. Declines  further evaluation or treatment otherwise    Hematuria, unspecified  Care Plan:  Comments : Counseled extensively. Differential reviewed, including serious etiologies. Asymptomatic. Continue per Dr. Gulshan Best . higher risk given  tobacco use reviewed.   Trace hemoglobin in urine, 0-1

## 2020-02-10 RX ORDER — FLUOXETINE HYDROCHLORIDE 20 MG/1
20 CAPSULE ORAL DAILY
Qty: 30 CAPSULE | Refills: 6 | Status: SHIPPED
Start: 2020-02-10 | End: 2020-07-27 | Stop reason: SDUPTHER

## 2020-02-20 PROBLEM — Z00.00 HEALTH MAINTENANCE EXAMINATION: Status: RESOLVED | Noted: 2019-05-22 | Resolved: 2020-02-20

## 2020-04-21 ENCOUNTER — TELEPHONE (OUTPATIENT)
Dept: PHARMACY | Facility: CLINIC | Age: 70
End: 2020-04-21

## 2020-04-21 NOTE — TELEPHONE ENCOUNTER
CLINICAL PHARMACY: ADHERENCE REVIEW  Identified care gap per [facility]; fills at [facility]: Statin adherence    Last Office Visit: 1/21/2020    ASSESSMENT  STATIN ADHERENCE  PDC: 90%    Per [facility]   Rosuvastatin 10 mg last picked up on 4/16/2020 for a 30 day supply; 4 refills remaining.  Billed through [facility]    Lab Results   Component Value Date    CHOL 154 01/13/2020    TRIG 193 (H) 01/13/2020    HDL 34 01/13/2020    LDLCALC 81 01/13/2020     ALT   Date Value Ref Range Status   01/13/2020 34 0 - 40 U/L Final     AST   Date Value Ref Range Status   01/13/2020 23 0 - 39 U/L Final     The 10-year ASCVD risk score (Joann Boykin et al., 2013) is: 39.4%    Values used to calculate the score:      Age: 81 years      Sex: Male      Is Non- : No      Diabetic: Yes      Tobacco smoker: Yes      Systolic Blood Pressure: 470 mmHg      Is BP treated: No      HDL Cholesterol: 34 mg/dL      Total Cholesterol: 154 mg/dL     PLAN  Attempting to reach patient to review changing prescription from a 30-day supply to a 90-day supply.  Left message asking for return call    Harsha Beckman, PharmD, [address] Pharmacist  O: [phone]  Department, toll free: [phone], option 7

## 2020-04-22 NOTE — TELEPHONE ENCOUNTER
CLINICAL PHARMACY: ADHERENCE REVIEW  Identified care gap per [facility]; fills at [facility]: Statin adherence     Last Office Visit: 1/21/2020     Second attempt to reach patient to review changing prescription from a 30-day supply to a 90-day supply.  Left message asking for return call. Will send letter.     Fe Sanchez, PharmD, [address] Pharmacist  O: [phone]  Department, toll free: [phone], option 7     CLINICAL PHARMACY CONSULT: MED RECONCILIATION/REVIEW ADDENDUM  For Pharmacy Admin Tracking Only  PHSO: Yes  Total # of Interventions Recommended: 1  - New Order #: 1 New Medication Order Reason(s):  Adherence  - Refills Provided #: 0  - Maintenance Safety Lab Monitoring #: 1  - New Therapy Lab Monitoring #: 0  Recommended intervention potential cost savings: 1  Total Interventions Accepted: 0  Time Spent (min): 15

## 2020-06-17 RX ORDER — ROSUVASTATIN CALCIUM 10 MG/1
10 TABLET, COATED ORAL DAILY
Qty: 90 TABLET | Refills: 1 | Status: SHIPPED
Start: 2020-06-17 | End: 2020-07-27 | Stop reason: SDUPTHER

## 2020-07-20 ENCOUNTER — HOSPITAL ENCOUNTER (OUTPATIENT)
Age: 70
Discharge: HOME | End: 2020-07-22
Payer: MEDICARE

## 2020-07-20 LAB
ALBUMIN SERPL-MCNC: 4.6 G/DL (ref 3.5–5.2)
ALP BLD-CCNC: 49 U/L (ref 40–129)
ALT SERPL-CCNC: 35 U/L (ref 0–40)
AMORPHOUS: NORMAL
ANION GAP SERPL CALCULATED.3IONS-SCNC: 17 MMOL/L (ref 7–16)
AST SERPL-CCNC: 33 U/L (ref 0–39)
BACTERIA: NORMAL /HPF
BASOPHILS ABSOLUTE: 0.04 E9/L (ref 0–0.2)
BASOPHILS RELATIVE PERCENT: 0.7 % (ref 0–2)
BILIRUB SERPL-MCNC: 0.8 MG/DL (ref 0–1.2)
BILIRUBIN URINE: NEGATIVE
BLOOD, URINE: NORMAL
BUN BLDV-MCNC: 16 MG/DL (ref 8–23)
CALCIUM SERPL-MCNC: 9.9 MG/DL (ref 8.6–10.2)
CHLORIDE BLD-SCNC: 103 MMOL/L (ref 98–107)
CHOLESTEROL, TOTAL: 124 MG/DL (ref 0–199)
CLARITY: CLEAR
CO2: 23 MMOL/L (ref 22–29)
COLOR: YELLOW
CREAT SERPL-MCNC: 1.3 MG/DL (ref 0.7–1.2)
CREATININE URINE: 133 MG/DL (ref 40–278)
EOSINOPHILS ABSOLUTE: 0.28 E9/L (ref 0.05–0.5)
EOSINOPHILS RELATIVE PERCENT: 4.8 % (ref 0–6)
GFR AFRICAN AMERICAN: >60
GFR NON-AFRICAN AMERICAN: 55 ML/MIN/1.73
GLUCOSE BLD-MCNC: 130 MG/DL (ref 74–99)
GLUCOSE URINE: NEGATIVE MG/DL
HBA1C MFR BLD: 5.5 % (ref 4–5.6)
HCT VFR BLD CALC: 47.3 % (ref 37–54)
HDLC SERPL-MCNC: 35 MG/DL
HEMOGLOBIN: 15.9 G/DL (ref 12.5–16.5)
IMMATURE GRANULOCYTES #: 0.01 E9/L
IMMATURE GRANULOCYTES %: 0.2 % (ref 0–5)
KETONES, URINE: NEGATIVE MG/DL
LDL CHOLESTEROL CALCULATED: 54 MG/DL (ref 0–99)
LEUKOCYTE ESTERASE, URINE: NEGATIVE
LYMPHOCYTES ABSOLUTE: 1.93 E9/L (ref 1.5–4)
LYMPHOCYTES RELATIVE PERCENT: 33.2 % (ref 20–42)
MCH RBC QN AUTO: 30.1 PG (ref 26–35)
MCHC RBC AUTO-ENTMCNC: 33.6 % (ref 32–34.5)
MCV RBC AUTO: 89.4 FL (ref 80–99.9)
MICROALBUMIN UR-MCNC: 14.2 MG/L
MICROALBUMIN/CREAT UR-RTO: 10.7 (ref 0–30)
MONOCYTES ABSOLUTE: 0.53 E9/L (ref 0.1–0.95)
MONOCYTES RELATIVE PERCENT: 9.1 % (ref 2–12)
NEUTROPHILS ABSOLUTE: 3.03 E9/L (ref 1.8–7.3)
NEUTROPHILS RELATIVE PERCENT: 52 % (ref 43–80)
NITRITE, URINE: NEGATIVE
PDW BLD-RTO: 13.4 FL (ref 11.5–15)
PH UA: 5.5 (ref 5–9)
PLATELET # BLD: 134 E9/L (ref 130–450)
PMV BLD AUTO: 9.8 FL (ref 7–12)
POTASSIUM SERPL-SCNC: 4.8 MMOL/L (ref 3.5–5)
PROSTATE SPECIFIC ANTIGEN: 3.53 NG/ML (ref 0–4)
PROTEIN UA: NEGATIVE MG/DL
RBC # BLD: 5.29 E12/L (ref 3.8–5.8)
RBC UA: NORMAL /HPF (ref 0–2)
SODIUM BLD-SCNC: 143 MMOL/L (ref 132–146)
SPECIFIC GRAVITY UA: 1.02 (ref 1–1.03)
TOTAL CK: 145 U/L (ref 20–200)
TOTAL PROTEIN: 7.5 G/DL (ref 6.4–8.3)
TRIGL SERPL-MCNC: 177 MG/DL (ref 0–149)
TSH SERPL DL<=0.05 MIU/L-ACNC: 2.56 UIU/ML (ref 0.27–4.2)
URIC ACID, SERUM: 9.3 MG/DL (ref 3.4–7)
UROBILINOGEN, URINE: 0.2 E.U./DL
VITAMIN D 25-HYDROXY: 36 NG/ML (ref 30–100)
VLDLC SERPL CALC-MCNC: 35 MG/DL
WBC # BLD: 5.8 E9/L (ref 4.5–11.5)
WBC UA: NORMAL /HPF (ref 0–5)

## 2020-07-20 PROCEDURE — 36415 COLL VENOUS BLD VENIPUNCTURE: CPT

## 2020-07-20 PROCEDURE — 82550 ASSAY OF CK (CPK): CPT

## 2020-07-20 PROCEDURE — 84550 ASSAY OF BLOOD/URIC ACID: CPT

## 2020-07-20 PROCEDURE — 81001 URINALYSIS AUTO W/SCOPE: CPT

## 2020-07-20 PROCEDURE — 84443 ASSAY THYROID STIM HORMONE: CPT

## 2020-07-20 PROCEDURE — 85025 COMPLETE CBC W/AUTO DIFF WBC: CPT

## 2020-07-20 PROCEDURE — 82570 ASSAY OF URINE CREATININE: CPT

## 2020-07-20 PROCEDURE — 83036 HEMOGLOBIN GLYCOSYLATED A1C: CPT

## 2020-07-20 PROCEDURE — 80053 COMPREHEN METABOLIC PANEL: CPT

## 2020-07-20 PROCEDURE — 82306 VITAMIN D 25 HYDROXY: CPT

## 2020-07-20 PROCEDURE — 84153 ASSAY OF PSA TOTAL: CPT

## 2020-07-20 PROCEDURE — 80061 LIPID PANEL: CPT

## 2020-07-20 PROCEDURE — 82044 UR ALBUMIN SEMIQUANTITATIVE: CPT

## 2020-07-20 NOTE — RESULT ENCOUNTER NOTE
PSA rising, should see urologist.  Uric acid high. Keep follow-up to review more detail sooner as needed. Sugar 130.   Emphasized low sugar low uric acid diet

## 2020-07-27 ENCOUNTER — VIRTUAL VISIT (OUTPATIENT)
Dept: PRIMARY CARE CLINIC | Age: 70
End: 2020-07-27
Payer: MEDICARE

## 2020-07-27 PROBLEM — E66.01 MORBIDLY OBESE (HCC): Status: ACTIVE | Noted: 2020-07-27

## 2020-07-27 PROCEDURE — 4004F PT TOBACCO SCREEN RCVD TLK: CPT | Performed by: FAMILY MEDICINE

## 2020-07-27 PROCEDURE — 3044F HG A1C LEVEL LT 7.0%: CPT | Performed by: FAMILY MEDICINE

## 2020-07-27 PROCEDURE — 3023F SPIROM DOC REV: CPT | Performed by: FAMILY MEDICINE

## 2020-07-27 PROCEDURE — 4040F PNEUMOC VAC/ADMIN/RCVD: CPT | Performed by: FAMILY MEDICINE

## 2020-07-27 PROCEDURE — 2022F DILAT RTA XM EVC RTNOPTHY: CPT | Performed by: FAMILY MEDICINE

## 2020-07-27 PROCEDURE — 99214 OFFICE O/P EST MOD 30 MIN: CPT | Performed by: FAMILY MEDICINE

## 2020-07-27 PROCEDURE — G8427 DOCREV CUR MEDS BY ELIG CLIN: HCPCS | Performed by: FAMILY MEDICINE

## 2020-07-27 PROCEDURE — G8417 CALC BMI ABV UP PARAM F/U: HCPCS | Performed by: FAMILY MEDICINE

## 2020-07-27 PROCEDURE — 1123F ACP DISCUSS/DSCN MKR DOCD: CPT | Performed by: FAMILY MEDICINE

## 2020-07-27 PROCEDURE — G8926 SPIRO NO PERF OR DOC: HCPCS | Performed by: FAMILY MEDICINE

## 2020-07-27 PROCEDURE — 3017F COLORECTAL CA SCREEN DOC REV: CPT | Performed by: FAMILY MEDICINE

## 2020-07-27 RX ORDER — ALLOPURINOL 300 MG/1
300 TABLET ORAL DAILY
Qty: 90 TABLET | Refills: 3 | Status: SHIPPED
Start: 2020-07-27 | End: 2021-07-09 | Stop reason: SDUPTHER

## 2020-07-27 RX ORDER — FLUOXETINE HYDROCHLORIDE 20 MG/1
20 CAPSULE ORAL DAILY
Qty: 90 CAPSULE | Refills: 3 | Status: SHIPPED
Start: 2020-07-27 | End: 2021-07-09 | Stop reason: SDUPTHER

## 2020-07-27 RX ORDER — FAMOTIDINE 20 MG/1
20 TABLET, FILM COATED ORAL DAILY
Qty: 90 TABLET | Refills: 3 | COMMUNITY
Start: 2020-07-27 | End: 2021-11-12

## 2020-07-27 RX ORDER — ROSUVASTATIN CALCIUM 10 MG/1
10 TABLET, COATED ORAL DAILY
Qty: 90 TABLET | Refills: 3 | Status: SHIPPED
Start: 2020-07-27 | End: 2021-07-09 | Stop reason: SDUPTHER

## 2020-07-27 RX ORDER — COLCHICINE 0.6 MG/1
TABLET ORAL
Qty: 30 TABLET | Refills: 0 | Status: SHIPPED
Start: 2020-07-27 | End: 2021-07-09 | Stop reason: SDUPTHER

## 2020-07-27 RX ORDER — ALBUTEROL SULFATE 90 UG/1
AEROSOL, METERED RESPIRATORY (INHALATION)
Qty: 1 INHALER | Refills: 6 | Status: SHIPPED
Start: 2020-07-27 | End: 2021-07-09 | Stop reason: SDUPTHER

## 2020-07-27 RX ORDER — ERGOCALCIFEROL 1.25 MG/1
50000 CAPSULE ORAL WEEKLY
Qty: 12 CAPSULE | Refills: 3 | Status: SHIPPED
Start: 2020-07-27 | End: 2021-07-09 | Stop reason: SDUPTHER

## 2020-07-27 RX ORDER — METOPROLOL SUCCINATE 25 MG/1
12.5 TABLET, EXTENDED RELEASE ORAL DAILY
Qty: 45 TABLET | Refills: 3 | Status: SHIPPED
Start: 2020-07-27 | End: 2021-07-09 | Stop reason: SDUPTHER

## 2020-07-27 NOTE — PROGRESS NOTES
TeleMedicine Patient Consent    This visit was performed as a virtual video visit using a synchronous, two-way, audio-video telehealth technology platform. Patient identification was verified at the start of the visit, including the patient's telephone number and physical location. I discussed with the patient the nature of our telehealth visits, that:     1. Due to the nature of an audio- video modality, the only components of a physical exam that could be done are the elements supported by direct observation. 2. I would evaluate the patient and recommend diagnostics and treatments based on my assessment. 3. If it was felt that the patient should be evaluated in clinic or an emergency room setting, then they would be directed there. 4. Our sessions are not being recorded and that personal health information is protected. 5. Our team would provide follow up care in person if/when the patient needs it. Patient does agree to proceed with telemedicine consultation. Patient's location: home address in [facility]    Physician  location other address in [place]     Other people involved in call:   Spouse    This visit was completed virtually using Doxy. me    2020    TELEHEALTH EVALUATION -- Audio/Visual (During VSBYM-81 public health emergency)    Chief Complaint   Patient presents with    Results    Medication Refill           HPI:    Daniela Andrade (:  1950) has requested an audio/video evaluation for the following concern(s):       Overall he feels well. Unfortunately still smoking. Counseled extensively on smoking cessation. Declines screening imaging, PFTs or medications. AAA screen. Sees work physician for h/o asbestos exposure. They did cxr and pfts    Would like to review blood work today and get refills. Overall feels well but unfortunately smoking again.   Eating a lot more sweets    Creatinine 1.3 elevated but relatively stable, glucose 130 but hemoglobin A1c only 5.5, triglycerides 1 93-1 77 LDL 54 HDL 35, TSH 2.560, vitamin D 36,CBC normal, PSA yvan to 3.53, urinalysis shows trace blood, microalbumin to creatinine ratio 10    Most Recent Labs  CBC  Lab Results   Component Value Date    WBC 5.8 07/20/2020    WBC 4.8 01/13/2020    WBC 5.2 04/25/2019    RBC 5.29 07/20/2020    RBC 5.49 01/13/2020    RBC 5.75 04/25/2019    HGB 15.9 07/20/2020    HGB 15.9 01/13/2020    HGB 17.0 04/25/2019    HCT 47.3 07/20/2020    HCT 48.8 01/13/2020    HCT 49.8 04/25/2019    MCV 89.4 07/20/2020    MCV 88.9 01/13/2020    MCV 86.6 04/25/2019     07/20/2020     01/13/2020     04/25/2019      CMP  Lab Results   Component Value Date     07/20/2020     01/13/2020     04/25/2019    K 4.8 07/20/2020    K 4.6 01/13/2020    K 4.6 04/25/2019     07/20/2020     01/13/2020     04/25/2019    CO2 23 07/20/2020    CO2 25 01/13/2020    CO2 26 04/25/2019    ANIONGAP 17 07/20/2020    ANIONGAP 16 01/13/2020    ANIONGAP 10 04/25/2019    GLUCOSE 130 07/20/2020    GLUCOSE 113 01/13/2020    GLUCOSE 124 04/25/2019    GLUCOSE 107 04/10/2012    GLUCOSE 105 11/29/2011    GLUCOSE 102 11/03/2011    BUN 16 07/20/2020    BUN 17 01/13/2020    BUN 15 04/25/2019    CREATININE 1.3 07/20/2020    CREATININE 1.2 01/13/2020    CREATININE 1.3 04/25/2019    LABGLOM 55 07/20/2020    LABGLOM >60 01/13/2020    LABGLOM 55 04/25/2019    GFRAA >60 07/20/2020    GFRAA >60 01/13/2020    GFRAA >60 04/25/2019    CALCIUM 9.9 07/20/2020    CALCIUM 9.3 01/13/2020    CALCIUM 9.6 04/25/2019    PROT 7.5 07/20/2020    PROT 7.4 01/13/2020    PROT 7.4 04/25/2019    LABALBU 4.6 07/20/2020    LABALBU 4.3 01/13/2020    LABALBU 4.2 04/25/2019    LABALBU 4.5 04/10/2012    LABALBU 4.4 11/29/2011    LABALBU 4.8 08/18/2011    BILITOT 0.8 07/20/2020    BILITOT 0.6 01/13/2020    BILITOT 0.6 04/25/2019    ALKPHOS 49 07/20/2020    ALKPHOS 58 01/13/2020    ALKPHOS 55 04/25/2019    AST 33 07/20/2020    AST 23 01/13/2020    AST 22 07/20/2020    BILIRUBINUR Negative 01/13/2020    BILIRUBINUR Negative 04/25/2019    BILIRUBINUR NEGATIVE 08/18/2011    BILIRUBINUR NEGATIVE 06/08/2011    BILIRUBINUR NEGATIVE 04/25/2011    KETUA Negative 07/20/2020    KETUA Negative 01/13/2020    KETUA Negative 04/25/2019    SPECGRAV 1.025 07/20/2020    SPECGRAV 1.020 01/13/2020    SPECGRAV 1.020 04/25/2019    BLOODU TRACE-INTACT 07/20/2020    BLOODU TRACE-INTACT 01/13/2020    BLOODU Negative 04/25/2019    PHUR 5.5 07/20/2020    PHUR 5.5 01/13/2020    PHUR 5.0 04/25/2019    PROTEINU Negative 07/20/2020    PROTEINU Negative 01/13/2020    PROTEINU Negative 04/25/2019    UROBILINOGEN 0.2 07/20/2020    UROBILINOGEN 0.2 01/13/2020    UROBILINOGEN 0.2 04/25/2019    NITRU Negative 07/20/2020    NITRU Negative 01/13/2020    NITRU Negative 04/25/2019    LEUKOCYTESUR Negative 07/20/2020    LEUKOCYTESUR Negative 01/13/2020    LEUKOCYTESUR TRACE 04/25/2019     Urine Micro/Albumin Ratio  Lab Results   Component Value Date    MALBCR 10.7 07/20/2020    MALBCR 12.9 01/13/2020    MALBCR - 04/25/2019         ROS:  Const: Denies chills, fever, malaise and sweats. Eyes: Denies discharge, pain, redness and visual disturbance. ENMT: Denies earaches, other ear symptoms. Denies nasal or sinus symptoms other than stated  above. Denies mouth and tongue lesions and sore throat. CV: Denies chest discomfort, pain; diaphoresis, dizziness, edema, lightheadedness, orthopnea,  palpitations, syncope and near syncopal episode or any exertional symptoms  Resp: Denies cough, hemoptysis, pleuritic pain, SOB, sputum production and wheezing. GI: Denies abdominal pain, change in bowel habits, hematochezia, melena, nausea and vomiting. : Denies urinary symptoms including dysuria , urgency, frequency or hematuria. Musculo: Denies musculoskeletal symptoms. Skin: Denies bruising and rash.   Neuro: Denies headache, numbness, stiff neck, tingling and focal weakness slurred speech or facial  droop  Hema/Lymph: Denies bleeding/bruising tendency and enlarged lymph nodes         Current Outpatient Medications:     vitamin D (ERGOCALCIFEROL) 1.25 MG (50718 UT) CAPS capsule, Take 1 capsule by mouth once a week, Disp: 12 capsule, Rfl: 3    tiotropium (SPIRIVA) 18 MCG inhalation capsule, Inhale 1 capsule into the lungs daily, Disp: 90 capsule, Rfl: 3    rosuvastatin (CRESTOR) 10 MG tablet, Take 1 tablet by mouth daily, Disp: 90 tablet, Rfl: 3    metoprolol succinate (TOPROL XL) 25 MG extended release tablet, Take 0.5 tablets by mouth daily, Disp: 45 tablet, Rfl: 3    FLUoxetine (PROZAC) 20 MG capsule, Take 1 capsule by mouth daily, Disp: 90 capsule, Rfl: 3    famotidine (PEPCID) 20 MG tablet, Take 1 tablet by mouth daily, Disp: 90 tablet, Rfl: 3    colchicine (COLCRYS) 0.6 MG tablet, Two tablets by mouth x 1 at first sign of gout, then one tablet one hour later, Disp: 30 tablet, Rfl: 0    allopurinol (ZYLOPRIM) 300 MG tablet, Take 1 tablet by mouth daily, Disp: 90 tablet, Rfl: 3    albuterol sulfate  (90 Base) MCG/ACT inhaler, 1-2 puffs q4-6hrs prn, Disp: 1 Inhaler, Rfl: 6    aspirin 81 MG EC tablet, Take 81 mg by mouth daily. , Disp: , Rfl:   No Known Allergies    Past Medical History:   Diagnosis Date    CAD (coronary artery disease)     COPD [facility]chronic obstructive pulmonary disease) ([facility] [address]     Hyperlipidemia     Hypertension      Past Surgical History:   Procedure Laterality Date    CORONARY ARTERY BYPASS GRAFT  06/09/11    ACB X 5- DR. ROBLEDO    DIAGNOSTIC CARDIAC CATH LAB PROCEDURE  06/08/11    DR. VÁSQUEZ    TONSILLECTOMY AND ADENOIDECTOMY       Family History   Problem Relation Age of Onset    Thyroid Disease Mother     Heart Attack Father 61    Hypertension Father     Hypertension Sister     Heart Surgery Sister     Cirrhosis Brother     Hypertension Sister     Heart Surgery Sister     Heart Surgery Sister     Hypertension Sister      Social History     Tobacco Use    Smoking status: Current Every Day Smoker     Packs/day: 1.00     Years: 45.00     Pack years: 45.00     Types: Cigarettes     Start date: 1970    Smokeless tobacco: Never Used   Substance Use Topics    Alcohol use: Yes     Comment: occassionally on holidays    Drug use: No     Social History     Social History Narrative        PMH:    Problem List: 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 FH:    Father:     age 61 - MI - hypertension. Mother:     age 66 - post-op complications - thyroid, reflux - post op after hip fx (septic). Brother -  52's cirrhosis from alcoholism    Brother (twin identical) htn (he follows with physicians regularly)    Sisters x 3 - all with HTN and 1 with CABG age 71 (stent 76)    Reviewed, no changes. SH:    Marital: Legal Status: . Previously Worked as , asbestos exposure, follows with work physicians    Personal Habits: Cigarette Use: Former Cigarette Smoker - quit ., now smoking again as of . Alcohol: Denies alcohol use. PHYSICAL EXAMINATION:  [ INSTRUCTIONS:  \"[x]\" Indicates a positive item  \"[]\" Indicates a negative item  -- DELETE ALL ITEMS NOT EXAMINED]  Vital Signs: (As obtained by patient/caregiver or practitioner observation)    There were no vitals filed for this visit.       Blood pressure-  Heart rate-    Respiratory rate-    Temperature-  Pulse oximetry-     Constitutional: [x] Appears well-developed and well-nourished [x] No apparent distress      [] Abnormal-   Mental status  [x] Alert and awake  [x] Oriented to person/place/time [x]Able to follow commands      Eyes:  EOM    [x]  Normal  [] Abnormal-  Sclera  [x]  Normal  [] Abnormal -         Discharge [x]  None visible  [] Abnormal -    HENT:   [x] Normocephalic, atraumatic. [] Abnormal   [x] Mouth/Throat: Mucous membranes are moist.     External Ears [x] Normal  [] Abnormal-     Neck: [x] No visualized mass     Pulmonary/Chest: [x] Respiratory effort normal.  [x] No visualized signs of difficulty breathing or respiratory distress        [] Abnormal-      Musculoskeletal:   [x] Normal gait with no signs of ataxia         [x] Normal range of motion of neck        [] Abnormal-       Neurological:        [x] No Facial Asymmetry (Cranial nerve 7 motor function) (limited exam to video visit)          [x] No gaze palsy        [] Abnormal-         Skin:        [x] No significant exanthematous lesions or discoloration noted on facial skin         [] Abnormal-            Psychiatric:       [x] Normal Affect [x] No Hallucinations        [] Abnormal-     Other pertinent observable physical exam findings-     ASSESSMENT/PLAN:   Diagnosis Orders   1. Prostate disorder  PSA, Diagnostic    TSH without Reflex    Comprehensive Metabolic Panel    CBC Auto Differential   2. Vitamin D deficiency  vitamin D (ERGOCALCIFEROL) 1.25 MG (95206 UT) CAPS capsule    TSH without Reflex    Comprehensive Metabolic Panel    CBC Auto Differential    Vitamin D 25 Hydroxy   3. Chronic obstructive pulmonary disease, unspecified COPD type (HCC)  tiotropium (SPIRIVA) 18 MCG inhalation capsule    albuterol sulfate  (90 Base) MCG/ACT inhaler    TSH without Reflex    Comprehensive Metabolic Panel    CBC Auto Differential   4. Essential hypertension  metoprolol succinate (TOPROL XL) 25 MG extended release tablet    TSH without Reflex    Comprehensive Metabolic Panel    CBC Auto Differential   5. Anxiety and depression  FLUoxetine (PROZAC) 20 MG capsule    TSH without Reflex    Comprehensive Metabolic Panel    CBC Auto Differential   6. Gastroesophageal reflux disease, esophagitis presence not specified  famotidine (PEPCID) 20 MG tablet    TSH without Reflex    Comprehensive Metabolic Panel    CBC Auto Differential   7. Chronic gout without tophus, unspecified cause, unspecified site  colchicine (COLCRYS) 0.6 MG tablet    allopurinol (ZYLOPRIM) 300 MG tablet    TSH without Reflex    Comprehensive Metabolic Panel    CBC Auto Differential    Uric Acid   8. Mixed hyperlipidemia  rosuvastatin (CRESTOR) 10 MG tablet    Lipid Panel    TSH without Reflex    Comprehensive Metabolic Panel    CBC Auto Differential    CK   9. Type 2 diabetes mellitus with hyperglycemia, without long-term current use of insulin (HCC)  Hemoglobin A1C    Urinalysis    Microalbumin / Creatinine Urine Ratio   10. Morbidly obese ([address]     11. Senile cataract of left eye, unspecified age-related cataract type     12. Asymptomatic microscopic hematuria     13. Coronary artery disease involving native coronary artery of native heart without angina pectoris     14. Chronic renal impairment, unspecified CKD stage     15. DDD (degenerative disc disease), lumbosacral     16. Liver disease     17. Obstructive chronic bronchitis without exacerbation ([address]     18. Polycythemia     19. Tobacco abuse         No problem-specific Assessment & Plan notes found for this encounter. Age-related cataract of left eye  Refer eye care     Hypertension  Care Plan:  Comments : His wife states he has significant whitecoat hypertension, but is excellent at home. Tolerating therapy. Risk of HTN reviewed. lifestyle modification emphasized. hyper and hypotensive precautions reviewed pulse parameters also  reviewed. Declines ACE inhibitor or ARB. Refill given     Other hyperlipidemia  Care Plan:  Comments : Tolerating Crestor. Goals reviewed. Declines change in therapy. Tolerating well. .SE's/Instructions/Risks/Benefits reviewed. If  ADR's, D/C and notify.   The risks  and benefits of Vitamin D3 and Coenzyme Q-10 supplementation reviewed. risk of hyperlipidemia reviewed lifestyle modification reviewed. Goals reviewed. Declines change in therapy. Previously on Lipitor but unaffordable.     Liver disease, unspecified  Care Plan:  Comments : Counseled extensively. Differential reviewed, including serious etiologies. Likely fatty liver. Precautions reviewed. Lifestyle modification appropriate diet  and weight loss reviewed. Risks of even this leading to cirrhosis reviewed. Other than basic monitoring on interested in other evaluation or treatment,  in-depth blood work, imaging or otherwise. Hepatitis C screen negative. LFTs normalized. Declines  further evaluation or treatment otherwise     Hematuria, unspecified  Care Plan:  Comments : Counseled extensively. Differential reviewed, including serious etiologies. Asymptomatic. Continue per Dr. Danial Quiñonez . higher risk given  tobacco use reviewed. Trace hemoglobin in urine, 0-1 RBCs. Increased risk because of tobacco abuse, I encouraged him to follow-up with Dr. Danial Quiñonez but he declines now and his wife states that he will always have this blood, several family members also have this. He is willing to see Dr. Caren Quijano potentially in the spring for his prostate but not now because of Covid-19      Encounter for general adult medical examination with bnormal  findings  Care Plan:  Comments : Health maintenance issues discussed at length 5/18. Encouraged yearly  Physicals. Had Pneumovax 1/20, plan/consider Prevnar thirteen 1/21 Fit cards. Refuses LDCT at this time, refuses colonoscopy this year.      Atherosclerotic heart disease of native coronary artery without angina  pectoris  Care Plan:  Comments : Status post CABG. asx. encouraged fu w/ cardiologist, previously dr Tank Meng,  declines. currently off ACE inhibitor/ARB and declines at this time      Type 2 diabetes mellitus with hyperglycemia  Care Plan:  Comments :  extensively. watch BS closely ambulatory. Parameters given. Call if not  in range. ER if dangerous numbers. Macro and microvascular complications  reviewed. Importance of at least yearly eye exams and daily foot exams  reviewed. Risks and benefits of insulin sensitizers reviewed and he declines  now. A1c excellent at 5.7-5.5      Gout, unspecified  Care Plan:  Comments : Emphasized low uric acid diet. Compliance on allopurinol has not been taking. Proper hydration. He has colcrys prn,2×1 at onset of  symptoms and repeat one hour later. .  reviewed. monitor uric acid. Uses infrequently      Chronic obstructive pulmonary disease, unspecified  Care Plan:  Comments : Asymptomatic. Declining further imaging, Pulmonary Function Tests or,  imaging incl LDCT despite counseling or referral at this time. Currently on  Spiriva and p.r.n. Proair which he rarely needs although a little more in the fall.     Intervertebral disc disorders with myelopathy, lumbar region  Care Plan:   Status post injections through [facility], then surgery with  discectomy/laminectomy through Dr. Danette Duncan. Continue per them. h/o PT     Vitamin D deficiency, unspecified  Care Plan:  Comments : Stable on prescription vitamin D,, continue monitoring     Disorder of prostate, unspecified  Care Plan:  Comments : Counseled extensively. Differential reviewed, including serious etiologies. rising  PSA is trending down from 2.6-2.2-3.53. Continue per Dr. Darien Ramirez, my concerns reviewed, defers exam, in person evaluation or seeing Dr. Quiana Liz before spring said he may reconsider them. Declines blood work before 6 months.         Secondary polycythemia  Care Plan:  Comments : Likely related to smoking, other differential reviewed. Monitor. Declines further  eval/tx. normalized     Dysthymic disorder  Care Plan:  Comments : Stable. Continue current therapy.  r/b meds reviewed.     Gastro-esophageal reflux disease without esophagitis  Care Plan:  Comments : Asymptomatic as long as taking therapy. Declines further testing and  understands risk of masking underlying etiologies. Risks of meds also reviewed  including calcium malabsorption .     CRI-  Counseled. Proper hydration. Avoid nephrotoxic agents monitor. Declines imaging or referral creatinine stable     Tobacco abuse-counseled extensively on importance of abstinence. States he gets chest x-ray and PFTs at work but declines LDCT, REF or otherwise. Declines abstinence or assistance.      Age-related cataract of left eye  Awaiting eye care, postponed because of Covid-19 pandemic. Counseled extensively and differential diagnoses of above were reviewed, including serious etiologies. Side effects and interactions of medications were reviewed. Plan as above:  Generally wants to stay conservative. Needs to stop smoking again. Simply wants refills. Declines blood work or follow-up for 6 months sooner as needed. Compliance emphasized. Prognosis guarded with comorbidities but otherwise feels well. Covid-19 precautions reviewed    Consider prevnar 13 after jan 2021. Counseled on flu shots      As long as symptoms steadily improve/resolve and medical conditions are following the expected course, FU as below, sooner PRN      Return in about 6 months (around 1/27/2021), or if symptoms worsen or fail to improve. Time spent: Greater than Not billed by time      Tg Ashley is a 78 y.o. male being evaluated by a Virtual Visit (video visit) encounter to address concerns as mentioned above. A caregiver was present when appropriate. Due to this being a TeleHealth encounter (During ECGFN-82 public health emergency), evaluation of the following organ systems was limited: Vitals/Constitutional/EENT/Resp/CV/GI//MS/Neuro/Skin/Heme-Lymph-Imm.   Pursuant to the emergency declaration under the [facility] authority and the [employer] Act, this Virtual Visit was conducted with patient's (and/or legal guardian's) consent, to reduce the patient's risk of exposure to COVID-19 and provide necessary medical care. The patient (and/or legal guardian) has also been advised to contact this office for worsening conditions or problems, and seek emergency medical treatment and/or call 911 if deemed necessary. Services were provided through a video synchronous discussion virtually to substitute for in-person clinic visit. Patients are advised to check with insurance company to ensure coverage and to fully understand benefits and cost prior to any testing to try to avoid unexpected charges. This note was created with the assistance of voice recognition software. Inadvertent errors may be present. Signs and symptoms to watch for were discussed. Serious signs and symptoms reviewed. ER if any    --Kaylah Damian MD on 7/27/2020 at 3:14 PM    An electronic signature was used to authenticate this note.

## 2020-11-03 PROBLEM — J44.9 COPD (CHRONIC OBSTRUCTIVE PULMONARY DISEASE) (HCC): Status: RESOLVED | Noted: 2020-11-03 | Resolved: 2020-11-03

## 2021-01-25 ENCOUNTER — TELEPHONE (OUTPATIENT)
Dept: PRIMARY CARE CLINIC | Age: 71
End: 2021-01-25

## 2021-01-25 ENCOUNTER — OFFICE VISIT (OUTPATIENT)
Dept: FAMILY MEDICINE CLINIC | Age: 71
End: 2021-01-25
Payer: MEDICARE

## 2021-01-25 VITALS
TEMPERATURE: 97.7 F | RESPIRATION RATE: 16 BRPM | HEIGHT: 64 IN | DIASTOLIC BLOOD PRESSURE: 80 MMHG | OXYGEN SATURATION: 98 % | HEART RATE: 78 BPM | WEIGHT: 211 LBS | SYSTOLIC BLOOD PRESSURE: 144 MMHG | BODY MASS INDEX: 36.02 KG/M2

## 2021-01-25 DIAGNOSIS — Z72.0 TOBACCO ABUSE: ICD-10-CM

## 2021-01-25 DIAGNOSIS — K04.7 DENTAL ABSCESS: Primary | ICD-10-CM

## 2021-01-25 DIAGNOSIS — K02.9 DENTAL CARIES: ICD-10-CM

## 2021-01-25 PROCEDURE — S0077 INJECTION, CLINDAMYCIN PHOSP: HCPCS | Performed by: PHYSICIAN ASSISTANT

## 2021-01-25 PROCEDURE — 4004F PT TOBACCO SCREEN RCVD TLK: CPT | Performed by: PHYSICIAN ASSISTANT

## 2021-01-25 PROCEDURE — 99214 OFFICE O/P EST MOD 30 MIN: CPT | Performed by: PHYSICIAN ASSISTANT

## 2021-01-25 PROCEDURE — G8417 CALC BMI ABV UP PARAM F/U: HCPCS | Performed by: PHYSICIAN ASSISTANT

## 2021-01-25 PROCEDURE — G8484 FLU IMMUNIZE NO ADMIN: HCPCS | Performed by: PHYSICIAN ASSISTANT

## 2021-01-25 PROCEDURE — 1123F ACP DISCUSS/DSCN MKR DOCD: CPT | Performed by: PHYSICIAN ASSISTANT

## 2021-01-25 PROCEDURE — 96372 THER/PROPH/DIAG INJ SC/IM: CPT | Performed by: PHYSICIAN ASSISTANT

## 2021-01-25 PROCEDURE — G8427 DOCREV CUR MEDS BY ELIG CLIN: HCPCS | Performed by: PHYSICIAN ASSISTANT

## 2021-01-25 PROCEDURE — 3017F COLORECTAL CA SCREEN DOC REV: CPT | Performed by: PHYSICIAN ASSISTANT

## 2021-01-25 PROCEDURE — 4040F PNEUMOC VAC/ADMIN/RCVD: CPT | Performed by: PHYSICIAN ASSISTANT

## 2021-01-25 RX ORDER — CLINDAMYCIN HYDROCHLORIDE 150 MG/1
450 CAPSULE ORAL 3 TIMES DAILY
Qty: 90 CAPSULE | Refills: 0 | Status: SHIPPED | OUTPATIENT
Start: 2021-01-25 | End: 2021-02-04

## 2021-01-25 RX ORDER — CLINDAMYCIN PHOSPHATE 150 MG/ML
600 INJECTION, SOLUTION INTRAVENOUS ONCE
Status: COMPLETED | OUTPATIENT
Start: 2021-01-25 | End: 2021-01-25

## 2021-01-25 RX ADMIN — CLINDAMYCIN PHOSPHATE 600 MG: 150 INJECTION, SOLUTION INTRAVENOUS at 12:54

## 2021-01-25 NOTE — PROGRESS NOTES
Cirrhosis Brother     Hypertension Sister     Heart Surgery Sister     Heart Surgery Sister     Hypertension Sister        Medications:     Current Outpatient Medications:     clindamycin (CLEOCIN) 150 MG capsule, Take 3 capsules by mouth 3 times daily for 10 days, Disp: 90 capsule, Rfl: 0    vitamin D (ERGOCALCIFEROL) 1.25 MG (97720 UT) CAPS capsule, Take 1 capsule by mouth once a week, Disp: 12 capsule, Rfl: 3    tiotropium (SPIRIVA) 18 MCG inhalation capsule, Inhale 1 capsule into the lungs daily, Disp: 90 capsule, Rfl: 3    rosuvastatin (CRESTOR) 10 MG tablet, Take 1 tablet by mouth daily, Disp: 90 tablet, Rfl: 3    metoprolol succinate (TOPROL XL) 25 MG extended release tablet, Take 0.5 tablets by mouth daily, Disp: 45 tablet, Rfl: 3    FLUoxetine (PROZAC) 20 MG capsule, Take 1 capsule by mouth daily, Disp: 90 capsule, Rfl: 3    famotidine (PEPCID) 20 MG tablet, Take 1 tablet by mouth daily, Disp: 90 tablet, Rfl: 3    colchicine (COLCRYS) 0.6 MG tablet, Two tablets by mouth x 1 at first sign of gout, then one tablet one hour later, Disp: 30 tablet, Rfl: 0    allopurinol (ZYLOPRIM) 300 MG tablet, Take 1 tablet by mouth daily, Disp: 90 tablet, Rfl: 3    albuterol sulfate  (90 Base) MCG/ACT inhaler, 1-2 puffs q4-6hrs prn, Disp: 1 Inhaler, Rfl: 6    aspirin 81 MG EC tablet, Take 81 mg by mouth daily. , Disp: , Rfl:     Allergies:   No Known Allergies    Social History:     Social History     Tobacco Use    Smoking status: Current Every Day Smoker     Packs/day: 1.00     Years: 45.00     Pack years: 45.00     Types: Cigarettes     Start date: 1/1/1970    Smokeless tobacco: Never Used   Substance Use Topics    Alcohol use: Yes     Comment: occassionally on holidays    Drug use: No       Patient lives at home.     Physical Exam:     Vitals:    01/25/21 1238   BP: (!) 144/80   Pulse: 78   Resp: 16   Temp: 97.7 °F (36.5 °C)   SpO2: 98%   Weight: 211 lb (95.7 kg)   Height: 5' 4\" (1.626 m) Exam:  Physical Exam  Nurses note and vital signs reviewed and patient is not hypoxic. General: The patient appears well and in no apparent distress. Patient is resting comfortably on cart. Skin: Warm, dry, no pallor noted. There is no rash noted. Head: Normocephalic, atraumatic. Eye: Normal conjunctiva  Ears, Nose, Mouth, and Throat: Oral mucosa is moist, the patient does have evidence of one tooth noted to the midline of the maxillary area. There is evidence of some abscess formation however there is no significant fluctuance at this time. Seems to be more indurated. The patient has no airway obstruction. No lip or tongue swelling. No signs of angioedema. Neck: No significant lymphadenopathy noted  Cardiovascular: Regular Rate and Rhythm  Respiratory: Patient is in no distress, no accessory muscle use, lungs are clear to auscultation, no wheezing, crackles or rhonchi  Back: Non-tender, no CVA tenderness bilaterally to percussion. Musculoskeletal: The patient has no evidence of calf tenderness, no pitting edema, symmetrical pulses noted bilaterally  Neurological: A&O x4, normal speech      Testing:           Medical Decision Making:     Vital signs reviewed    Past medical history reviewed. Allergies reviewed. Medications reviewed. Patient on arrival does not appear to be in any apparent distress or discomfort. The patient will be treated with intramuscular injection of clindamycin here. The patient was encouraged to stop smoking. The patient will be given a prescription for clindamycin. The patient was given the name and number for the dental clinic to follow-up with. They also had questions about getting the Covid vaccine and they were provided with the name and number to follow-up with. Clinical Impression:   Bacilio Phelan was seen today for dental pain.     Diagnoses and all orders for this visit:    Dental abscess    Dental caries    Tobacco abuse    Other orders  - clindamycin (CLEOCIN) injection 600 mg  -     clindamycin (CLEOCIN) 150 MG capsule; Take 3 capsules by mouth 3 times daily for 10 days        The patient is to call for any concerns or return if any of the signs or symptoms worsen. The patient is to follow-up with PCP in the next 2-3 days for repeat evaluation repeat assessment or go directly to the emergency department.      SIGNATURE: Julio Cesar Brock III, PA-C

## 2021-01-25 NOTE — TELEPHONE ENCOUNTER
Received call from pt's wife. He has an abscessed tooth and his jaw and lip are swollen. She is refusing to contact a dentist and also refusing to being him because of his medical conditions. She wants to know if you can send something in for him.   Pt uses [facility] on [employer]

## 2021-02-21 ENCOUNTER — IMMUNIZATION (OUTPATIENT)
Dept: PRIMARY CARE CLINIC | Age: 71
End: 2021-02-21
Payer: MEDICARE

## 2021-02-21 PROCEDURE — 91300 COVID-19, PFIZER VACCINE 30MCG/0.3ML DOSE: CPT | Performed by: NURSE PRACTITIONER

## 2021-02-21 PROCEDURE — 0001A COVID-19, PFIZER VACCINE 30MCG/0.3ML DOSE: CPT | Performed by: NURSE PRACTITIONER

## 2021-03-15 ENCOUNTER — IMMUNIZATION (OUTPATIENT)
Dept: PRIMARY CARE CLINIC | Age: 71
End: 2021-03-15
Payer: MEDICARE

## 2021-03-15 PROCEDURE — 0002A COVID-19, PFIZER VACCINE 30MCG/0.3ML DOSE: CPT | Performed by: PHYSICIAN ASSISTANT

## 2021-03-15 PROCEDURE — 91300 COVID-19, PFIZER VACCINE 30MCG/0.3ML DOSE: CPT | Performed by: PHYSICIAN ASSISTANT

## 2021-07-02 DIAGNOSIS — I10 ESSENTIAL HYPERTENSION: ICD-10-CM

## 2021-07-02 DIAGNOSIS — F41.9 ANXIETY AND DEPRESSION: ICD-10-CM

## 2021-07-02 DIAGNOSIS — J44.9 CHRONIC OBSTRUCTIVE PULMONARY DISEASE, UNSPECIFIED COPD TYPE (HCC): ICD-10-CM

## 2021-07-02 DIAGNOSIS — E55.9 VITAMIN D DEFICIENCY: ICD-10-CM

## 2021-07-02 DIAGNOSIS — K21.9 GASTROESOPHAGEAL REFLUX DISEASE: ICD-10-CM

## 2021-07-02 DIAGNOSIS — M1A.9XX0 CHRONIC GOUT WITHOUT TOPHUS, UNSPECIFIED CAUSE, UNSPECIFIED SITE: ICD-10-CM

## 2021-07-02 DIAGNOSIS — E11.65 TYPE 2 DIABETES MELLITUS WITH HYPERGLYCEMIA, WITHOUT LONG-TERM CURRENT USE OF INSULIN (HCC): ICD-10-CM

## 2021-07-02 DIAGNOSIS — E78.2 MIXED HYPERLIPIDEMIA: ICD-10-CM

## 2021-07-02 DIAGNOSIS — F32.A ANXIETY AND DEPRESSION: ICD-10-CM

## 2021-07-02 DIAGNOSIS — N42.9 PROSTATE DISORDER: ICD-10-CM

## 2021-07-02 LAB
ALBUMIN SERPL-MCNC: 4.3 G/DL (ref 3.5–5.2)
ALP BLD-CCNC: 55 U/L (ref 40–129)
ALT SERPL-CCNC: 31 U/L (ref 0–40)
ANION GAP SERPL CALCULATED.3IONS-SCNC: 9 MMOL/L (ref 7–16)
AST SERPL-CCNC: 38 U/L (ref 0–39)
BASOPHILS ABSOLUTE: 0.03 E9/L (ref 0–0.2)
BASOPHILS RELATIVE PERCENT: 0.6 % (ref 0–2)
BILIRUB SERPL-MCNC: 0.7 MG/DL (ref 0–1.2)
BILIRUBIN URINE: NEGATIVE
BLOOD, URINE: NEGATIVE
BUN BLDV-MCNC: 16 MG/DL (ref 6–23)
CALCIUM SERPL-MCNC: 9.4 MG/DL (ref 8.6–10.2)
CHLORIDE BLD-SCNC: 106 MMOL/L (ref 98–107)
CHOLESTEROL, TOTAL: 120 MG/DL (ref 0–199)
CLARITY: CLEAR
CO2: 27 MMOL/L (ref 22–29)
COLOR: YELLOW
CREAT SERPL-MCNC: 1.1 MG/DL (ref 0.7–1.2)
CREATININE URINE: 188 MG/DL (ref 40–278)
EOSINOPHILS ABSOLUTE: 0.21 E9/L (ref 0.05–0.5)
EOSINOPHILS RELATIVE PERCENT: 4.5 % (ref 0–6)
GFR AFRICAN AMERICAN: >60
GFR NON-AFRICAN AMERICAN: >60 ML/MIN/1.73
GLUCOSE BLD-MCNC: 133 MG/DL (ref 74–99)
GLUCOSE URINE: NEGATIVE MG/DL
HBA1C MFR BLD: 5.6 % (ref 4–5.6)
HCT VFR BLD CALC: 47 % (ref 37–54)
HDLC SERPL-MCNC: 29 MG/DL
HEMOGLOBIN: 15.7 G/DL (ref 12.5–16.5)
IMMATURE GRANULOCYTES #: 0.01 E9/L
IMMATURE GRANULOCYTES %: 0.2 % (ref 0–5)
KETONES, URINE: NEGATIVE MG/DL
LDL CHOLESTEROL CALCULATED: 57 MG/DL (ref 0–99)
LEUKOCYTE ESTERASE, URINE: NEGATIVE
LYMPHOCYTES ABSOLUTE: 1.35 E9/L (ref 1.5–4)
LYMPHOCYTES RELATIVE PERCENT: 29 % (ref 20–42)
MCH RBC QN AUTO: 30 PG (ref 26–35)
MCHC RBC AUTO-ENTMCNC: 33.4 % (ref 32–34.5)
MCV RBC AUTO: 89.9 FL (ref 80–99.9)
MICROALBUMIN UR-MCNC: 15.2 MG/L
MICROALBUMIN/CREAT UR-RTO: 8.1 (ref 0–30)
MONOCYTES ABSOLUTE: 0.65 E9/L (ref 0.1–0.95)
MONOCYTES RELATIVE PERCENT: 13.9 % (ref 2–12)
NEUTROPHILS ABSOLUTE: 2.41 E9/L (ref 1.8–7.3)
NEUTROPHILS RELATIVE PERCENT: 51.8 % (ref 43–80)
NITRITE, URINE: NEGATIVE
PDW BLD-RTO: 13.6 FL (ref 11.5–15)
PH UA: 5.5 (ref 5–9)
PLATELET # BLD: 144 E9/L (ref 130–450)
PMV BLD AUTO: 9.3 FL (ref 7–12)
POTASSIUM SERPL-SCNC: 4.9 MMOL/L (ref 3.5–5)
PROSTATE SPECIFIC ANTIGEN: 1.81 NG/ML (ref 0–4)
PROTEIN UA: NEGATIVE MG/DL
RBC # BLD: 5.23 E12/L (ref 3.8–5.8)
SODIUM BLD-SCNC: 142 MMOL/L (ref 132–146)
SPECIFIC GRAVITY UA: >=1.03 (ref 1–1.03)
TOTAL CK: 117 U/L (ref 20–200)
TOTAL PROTEIN: 7.4 G/DL (ref 6.4–8.3)
TRIGL SERPL-MCNC: 171 MG/DL (ref 0–149)
TSH SERPL DL<=0.05 MIU/L-ACNC: 1.5 UIU/ML (ref 0.27–4.2)
URIC ACID, SERUM: 8.7 MG/DL (ref 3.4–7)
UROBILINOGEN, URINE: 0.2 E.U./DL
VITAMIN D 25-HYDROXY: 37 NG/ML (ref 30–100)
VLDLC SERPL CALC-MCNC: 34 MG/DL
WBC # BLD: 4.7 E9/L (ref 4.5–11.5)

## 2021-07-09 ENCOUNTER — OFFICE VISIT (OUTPATIENT)
Dept: PRIMARY CARE CLINIC | Age: 71
End: 2021-07-09
Payer: MEDICARE

## 2021-07-09 VITALS
TEMPERATURE: 96.9 F | HEART RATE: 61 BPM | DIASTOLIC BLOOD PRESSURE: 88 MMHG | SYSTOLIC BLOOD PRESSURE: 138 MMHG | OXYGEN SATURATION: 98 % | HEIGHT: 64 IN | WEIGHT: 206 LBS | BODY MASS INDEX: 35.17 KG/M2

## 2021-07-09 DIAGNOSIS — H25.9 SENILE CATARACT OF LEFT EYE, UNSPECIFIED AGE-RELATED CATARACT TYPE: ICD-10-CM

## 2021-07-09 DIAGNOSIS — J44.9 CHRONIC OBSTRUCTIVE PULMONARY DISEASE, UNSPECIFIED COPD TYPE (HCC): ICD-10-CM

## 2021-07-09 DIAGNOSIS — I25.10 CORONARY ARTERY DISEASE INVOLVING NATIVE CORONARY ARTERY OF NATIVE HEART WITHOUT ANGINA PECTORIS: ICD-10-CM

## 2021-07-09 DIAGNOSIS — F41.9 ANXIETY AND DEPRESSION: ICD-10-CM

## 2021-07-09 DIAGNOSIS — I10 ESSENTIAL HYPERTENSION: ICD-10-CM

## 2021-07-09 DIAGNOSIS — E78.2 MIXED HYPERLIPIDEMIA: ICD-10-CM

## 2021-07-09 DIAGNOSIS — M1A.9XX0 CHRONIC GOUT WITHOUT TOPHUS, UNSPECIFIED CAUSE, UNSPECIFIED SITE: Primary | ICD-10-CM

## 2021-07-09 DIAGNOSIS — E66.01 SEVERE OBESITY (BMI 35.0-35.9 WITH COMORBIDITY) (HCC): ICD-10-CM

## 2021-07-09 DIAGNOSIS — D68.9 COAGULATION DEFECT (HCC): ICD-10-CM

## 2021-07-09 DIAGNOSIS — E55.9 VITAMIN D DEFICIENCY: ICD-10-CM

## 2021-07-09 DIAGNOSIS — E11.65 TYPE 2 DIABETES MELLITUS WITH HYPERGLYCEMIA, WITHOUT LONG-TERM CURRENT USE OF INSULIN (HCC): ICD-10-CM

## 2021-07-09 DIAGNOSIS — Z87.891 PERSONAL HISTORY OF TOBACCO USE: ICD-10-CM

## 2021-07-09 DIAGNOSIS — Z12.11 SCREENING FOR COLON CANCER: ICD-10-CM

## 2021-07-09 DIAGNOSIS — M17.0 PRIMARY OSTEOARTHRITIS OF BOTH KNEES: ICD-10-CM

## 2021-07-09 DIAGNOSIS — K21.9 GASTROESOPHAGEAL REFLUX DISEASE WITHOUT ESOPHAGITIS: ICD-10-CM

## 2021-07-09 DIAGNOSIS — F32.A ANXIETY AND DEPRESSION: ICD-10-CM

## 2021-07-09 PROCEDURE — 99215 OFFICE O/P EST HI 40 MIN: CPT | Performed by: FAMILY MEDICINE

## 2021-07-09 PROCEDURE — 4040F PNEUMOC VAC/ADMIN/RCVD: CPT | Performed by: FAMILY MEDICINE

## 2021-07-09 PROCEDURE — 1123F ACP DISCUSS/DSCN MKR DOCD: CPT | Performed by: FAMILY MEDICINE

## 2021-07-09 PROCEDURE — 3023F SPIROM DOC REV: CPT | Performed by: FAMILY MEDICINE

## 2021-07-09 PROCEDURE — G8417 CALC BMI ABV UP PARAM F/U: HCPCS | Performed by: FAMILY MEDICINE

## 2021-07-09 PROCEDURE — 2022F DILAT RTA XM EVC RTNOPTHY: CPT | Performed by: FAMILY MEDICINE

## 2021-07-09 PROCEDURE — G8427 DOCREV CUR MEDS BY ELIG CLIN: HCPCS | Performed by: FAMILY MEDICINE

## 2021-07-09 PROCEDURE — 4004F PT TOBACCO SCREEN RCVD TLK: CPT | Performed by: FAMILY MEDICINE

## 2021-07-09 PROCEDURE — 3044F HG A1C LEVEL LT 7.0%: CPT | Performed by: FAMILY MEDICINE

## 2021-07-09 PROCEDURE — 3017F COLORECTAL CA SCREEN DOC REV: CPT | Performed by: FAMILY MEDICINE

## 2021-07-09 PROCEDURE — G0296 VISIT TO DETERM LDCT ELIG: HCPCS | Performed by: FAMILY MEDICINE

## 2021-07-09 PROCEDURE — G8926 SPIRO NO PERF OR DOC: HCPCS | Performed by: FAMILY MEDICINE

## 2021-07-09 RX ORDER — ROSUVASTATIN CALCIUM 10 MG/1
10 TABLET, COATED ORAL DAILY
Qty: 90 TABLET | Refills: 3 | Status: SHIPPED
Start: 2021-07-09 | End: 2022-06-23 | Stop reason: SDUPTHER

## 2021-07-09 RX ORDER — ALLOPURINOL 300 MG/1
300 TABLET ORAL DAILY
Qty: 90 TABLET | Refills: 3 | Status: SHIPPED
Start: 2021-07-09 | End: 2021-11-12 | Stop reason: SDUPTHER

## 2021-07-09 RX ORDER — ERGOCALCIFEROL 1.25 MG/1
50000 CAPSULE ORAL WEEKLY
Qty: 12 CAPSULE | Refills: 3 | Status: SHIPPED
Start: 2021-07-09 | End: 2022-06-23 | Stop reason: SDUPTHER

## 2021-07-09 RX ORDER — METOPROLOL SUCCINATE 25 MG/1
12.5 TABLET, EXTENDED RELEASE ORAL DAILY
Qty: 45 TABLET | Refills: 3 | Status: SHIPPED
Start: 2021-07-09 | End: 2022-06-23 | Stop reason: SDUPTHER

## 2021-07-09 RX ORDER — FLUOXETINE HYDROCHLORIDE 20 MG/1
20 CAPSULE ORAL DAILY
Qty: 90 CAPSULE | Refills: 3 | Status: SHIPPED
Start: 2021-07-09 | End: 2022-06-23 | Stop reason: SDUPTHER

## 2021-07-09 RX ORDER — COLCHICINE 0.6 MG/1
TABLET ORAL
Qty: 30 TABLET | Refills: 0 | Status: SHIPPED | OUTPATIENT
Start: 2021-07-09

## 2021-07-09 RX ORDER — ALBUTEROL SULFATE 90 UG/1
AEROSOL, METERED RESPIRATORY (INHALATION)
Qty: 3 INHALER | Refills: 6 | Status: SHIPPED
Start: 2021-07-09 | End: 2022-06-23 | Stop reason: SDUPTHER

## 2021-07-09 NOTE — PROGRESS NOTES
19  Junior Mead : 1950 Sex: male  Age: 73 y.o. Chief Complaint   Patient presents with   R Manuel Reynolds 89     in left eye, discuss getting it removed       HPI  HPI:     He is here for follow-up, with his wife. Overall feels very well. Increasing knee pain. Failed conservative measures. Interested in surgery. Would need cardiac clearance first.  Would like referred to [address] [place] [address]. Unfortunately still smoking. He will think about smoking, not ready yet. Counseled extensively on smoking cessation. Willing to have LDCT, declines referral to pulmonology, PFTs. Defers AAA screen. Sees work physician for h/o asbestos exposure. They did cxr and pfts      Having issues with left cataract, wants referral          Behind on cardiology and emphasized. He would like referred back to [facility], Dr. Marius Burkitt. Should also fu dr shaffer, deferring now      HM  Agrees to FIT; he'll consider cscope, declines now. Counseled on shingrix  Agrees to LDCT        Labs reviewed,CMP normal except glucose 133 with a hemoglobin A1c of 5.6 only but a glucose of 133, 130 last time. Technically qualifying for diabetes with a fasting glucose parameters.   Uric acid down but still elevated from 9.3-8.7, compliance on allopurinol reviewed, low uric acid diet emphasized, LDL 57 HDL 29 triglyceride 171 TSH 1.5 vitamin D 37 PSA 3.53-1.81 CBC grossly normal differential reviewed urinalysis negative        Most Recent Labs  CBC  Lab Results   Component Value Date    WBC 4.7 2021    WBC 5.8 2020    WBC 4.8 2020    RBC 5.23 2021    RBC 5.29 2020    RBC 5.49 2020    HGB 15.7 2021    HGB 15.9 2020    HGB 15.9 2020    HCT 47.0 2021    HCT 47.3 2020    HCT 48.8 2020    MCV 89.9 2021    MCV 89.4 2020    MCV 88.9 2020     2021     2020     2020      CMP  Lab Results   Component Value Date  07/02/2021     07/20/2020     01/13/2020    K 4.9 07/02/2021    K 4.8 07/20/2020    K 4.6 01/13/2020     07/02/2021     07/20/2020     01/13/2020    CO2 27 07/02/2021    CO2 23 07/20/2020    CO2 25 01/13/2020    ANIONGAP 9 07/02/2021    ANIONGAP 17 07/20/2020    ANIONGAP 16 01/13/2020    GLUCOSE 133 07/02/2021    GLUCOSE 130 07/20/2020    GLUCOSE 113 01/13/2020    GLUCOSE 107 04/10/2012    GLUCOSE 105 11/29/2011    GLUCOSE 102 11/03/2011    BUN 16 07/02/2021    BUN 16 07/20/2020    BUN 17 01/13/2020    CREATININE 1.1 07/02/2021    CREATININE 1.3 07/20/2020    CREATININE 1.2 01/13/2020    LABGLOM >60 07/02/2021    LABGLOM 55 07/20/2020    LABGLOM >60 01/13/2020    GFRAA >60 07/02/2021    GFRAA >60 07/20/2020    GFRAA >60 01/13/2020    CALCIUM 9.4 07/02/2021    CALCIUM 9.9 07/20/2020    CALCIUM 9.3 01/13/2020    PROT 7.4 07/02/2021    PROT 7.5 07/20/2020    PROT 7.4 01/13/2020    LABALBU 4.3 07/02/2021    LABALBU 4.6 07/20/2020    LABALBU 4.3 01/13/2020    LABALBU 4.5 04/10/2012    LABALBU 4.4 11/29/2011    LABALBU 4.8 08/18/2011    BILITOT 0.7 07/02/2021    BILITOT 0.8 07/20/2020    BILITOT 0.6 01/13/2020    ALKPHOS 55 07/02/2021    ALKPHOS 49 07/20/2020    ALKPHOS 58 01/13/2020    AST 38 07/02/2021    AST 33 07/20/2020    AST 23 01/13/2020    ALT 31 07/02/2021    ALT 35 07/20/2020    ALT 34 01/13/2020     A1C  Lab Results   Component Value Date    LABA1C 5.6 07/02/2021    LABA1C 5.5 07/20/2020    LABA1C 5.7 01/13/2020     TSH  Lab Results   Component Value Date    TSH 1.500 07/02/2021    TSH 2.560 07/20/2020    TSH 2.760 01/13/2020     FREET4  No results found for: [id number]  LIPID  Lab Results   Component Value Date    CHOL 120 07/02/2021    CHOL 124 07/20/2020    CHOL 154 01/13/2020    HDL 29 07/02/2021    HDL 35 07/20/2020    HDL 34 01/13/2020    LDLCALC 57 07/02/2021    LDLCALC 54 07/20/2020    LDLCALC 81 01/13/2020    TRIG 171 07/02/2021    TRIG 177 07/20/2020    TRIG 193 01/13/2020     VITAMIN D  Lab Results   Component Value Date    VITD25 37 07/02/2021    VITD25 36 07/20/2020    VITD25 30 01/13/2020     MAGNESIUM  Lab Results   Component Value Date    MG 2.6 06/20/2011    MG 2.3 06/11/2011    MG 2.3 06/10/2011      PHOS  Lab Results   Component Value Date    PHOS 2.5 06/11/2011    PHOS 4.1 06/10/2011    PHOS 4.2 06/09/2011      MARIA GUADALUPE   Lab Results   Component Value Date    MARIA GUADALUPE NEGATIVE 08/30/2015    MARIA GUADALUPE NEGATIVE 08/18/2011    MARIA GUADALUPE NEGATIVE 06/06/2011     RHEUMATOID FACTOR  Lab Results   Component Value Date    RF <10 08/30/2015    RF <4 08/18/2011    RF <4 06/06/2011     PSA  Lab Results   Component Value Date    PSA 1.81 07/02/2021    PSA 3.53 07/20/2020    PSA 2.23 04/25/2019      HEPATITIS C  Lab Results   Component Value Date    HCVABI Non-Reactive 06/11/2018     HIV  No results found for: OPR4OUQ, HIV1QT  UA  Lab Results   Component Value Date    COLORU Yellow 07/02/2021    COLORU Yellow 07/20/2020    COLORU Yellow 01/13/2020    CLARITYU Clear 07/02/2021    CLARITYU Clear 07/20/2020    CLARITYU Clear 01/13/2020    GLUCOSEU Negative 07/02/2021    GLUCOSEU Negative 07/20/2020    GLUCOSEU Negative 01/13/2020    GLUCOSEU NEGATIVE 08/18/2011    GLUCOSEU NEGATIVE 06/08/2011    GLUCOSEU NEGATIVE 04/25/2011    BILIRUBINUR Negative 07/02/2021    BILIRUBINUR Negative 07/20/2020    BILIRUBINUR Negative 01/13/2020    BILIRUBINUR NEGATIVE 08/18/2011    BILIRUBINUR NEGATIVE 06/08/2011    BILIRUBINUR NEGATIVE 04/25/2011    KETUA Negative 07/02/2021    KETUA Negative 07/20/2020    KETUA Negative 01/13/2020    SPECGRAV >=1.030 07/02/2021    SPECGRAV 1.025 07/20/2020    SPECGRAV 1.020 01/13/2020    BLOODU Negative 07/02/2021    BLOODU TRACE-INTACT 07/20/2020    BLOODU TRACE-INTACT 01/13/2020    PHUR 5.5 07/02/2021    PHUR 5.5 07/20/2020    PHUR 5.5 01/13/2020    PROTEINU Negative 07/02/2021    PROTEINU Negative 07/20/2020    PROTEINU Negative 01/13/2020    UROBILINOGEN 0.2 07/02/2021 UROBILINOGEN 0.2 07/20/2020    UROBILINOGEN 0.2 01/13/2020    NITRU Negative 07/02/2021    NITRU Negative 07/20/2020    NITRU Negative 01/13/2020    LEUKOCYTESUR Negative 07/02/2021    LEUKOCYTESUR Negative 07/20/2020    LEUKOCYTESUR Negative 01/13/2020     Urine Micro/Albumin Ratio  Lab Results   Component Value Date    MALBCR 8.1 07/02/2021    MALBCR 10.7 07/20/2020    MALBCR 12.9 01/13/2020       Review of Systems  ROS:  Const: Denies chills, fever, malaise and sweats. Eyes: Denies discharge, pain, redness and visual disturbance other than above. ENMT: Denies earaches, other ear symptoms. Denies nasal or sinus symptoms other than stated  above. Denies mouth and tongue lesions and sore throat. CV: Denies chest discomfort, pain; diaphoresis, dizziness, edema, lightheadedness, orthopnea,  palpitations, syncope and near syncopal episode or any exertional symptoms  Resp: Denies cough, hemoptysis, pleuritic pain, SOB, sputum production and wheezing. GI: Denies abdominal pain, change in bowel habits, hematochezia, melena, nausea and vomiting. : Denies urinary symptoms including dysuria , urgency, frequency or hematuria. Musculo: Chronic low back pain with limited range of motion, chronic knee pain. OA. Skin: Denies bruising and rash.   Neuro: Denies headache, numbness, stiff neck, tingling and focal weakness slurred speech or facial  droop  Hema/Lymph: Denies bleeding/bruising tendency and enlarged lymph nodes        Current Outpatient Medications:     allopurinol (ZYLOPRIM) 300 MG tablet, Take 1 tablet by mouth daily, Disp: 90 tablet, Rfl: 3    colchicine (COLCRYS) 0.6 MG tablet, Two tablets by mouth x 1 at first sign of gout, then one tablet one hour later, Disp: 30 tablet, Rfl: 0    FLUoxetine (PROZAC) 20 MG capsule, Take 1 capsule by mouth daily, Disp: 90 capsule, Rfl: 3    metoprolol succinate (TOPROL XL) 25 MG extended release tablet, Take 0.5 tablets by mouth daily, Disp: 45 tablet, Rfl: 3   rosuvastatin (CRESTOR) 10 MG tablet, Take 1 tablet by mouth daily, Disp: 90 tablet, Rfl: 3    tiotropium (SPIRIVA) 18 MCG inhalation capsule, Inhale 1 capsule into the lungs daily, Disp: 90 capsule, Rfl: 3    albuterol sulfate  (90 Base) MCG/ACT inhaler, 1-2 puffs q4-6hrs prn, Disp: 3 Inhaler, Rfl: 6    vitamin D (ERGOCALCIFEROL) 1.25 MG (40318 UT) CAPS capsule, Take 1 capsule by mouth once a week, Disp: 12 capsule, Rfl: 3    famotidine (PEPCID) 20 MG tablet, Take 1 tablet by mouth daily, Disp: 90 tablet, Rfl: 3    aspirin 81 MG EC tablet, Take 81 mg by mouth daily. , Disp: , Rfl:   No Known Allergies    Past Medical History:   Diagnosis Date    CAD (coronary artery disease)     COPD [facility]chronic obstructive pulmonary disease) [facility]     Hyperlipidemia     Hypertension      Past Surgical History:   Procedure Laterality Date    CORONARY ARTERY BYPASS GRAFT  06/09/11    ACB X 5- DR. ROBLEDO    DIAGNOSTIC CARDIAC CATH LAB PROCEDURE  06/08/11    DR. VÁSQUEZ    TONSILLECTOMY AND ADENOIDECTOMY       Family History   Problem Relation Age of Onset    Thyroid Disease Mother     Heart Attack Father 61    Hypertension Father     Hypertension Sister     Heart Surgery Sister     Cirrhosis Brother     Hypertension Sister     Heart Surgery Sister     Heart Surgery Sister     Hypertension Sister      Social History     Tobacco Use    Smoking status: Current Every Day Smoker     Packs/day: 1.00     Years: 45.00     Pack years: 45.00     Types: Cigarettes     Start date: 1/1/1970    Smokeless tobacco: Never Used   Substance Use Topics    Alcohol use: Yes     Comment: occassionally on holidays    Drug use: No      Social History     Social History Narrative        PMH:    Problem List: 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 FH:    Father:     age 61 - MI - hypertension. Mother:     age 66 - post-op complications - thyroid, reflux - post op after hip fx (septic). Brother -  52's cirrhosis from alcoholism    Brother (twin identical) htn (he follows with physicians regularly)    Sisters x 3 - all with HTN and 1 with CABG age 71 (stent 76)    Reviewed, no changes. SH:    Marital: Legal Status: . Previously Worked as , asbestos exposure, follows with work physicians    Personal Habits: Cigarette Use: Former Cigarette Smoker - quit ., now smoking again as of . Alcohol: Denies alcohol use. FH:  Father:   age 61 - MI - hypertension. Mother:   age 66 - post-op complications - thyroid, reflux - post op after hip fx (septic). Brother -  52's cirrhosis from alcoholism  Brother (twin identical) htn (he follows with physicians regularly)  Sisters x 3 - all with HTN and 1 with CABG age 71 (stent 76)                  Vitals:    21 1254 21 1306 21 1410   BP: (!) 150/80 (!) 146/78 138/88   Pulse: 61     Temp: 96.9 °F (36.1 °C)     SpO2: 98%     Weight: 206 lb (93.4 kg)     Height: 5' 4\" (1.626 m)        Wt Readings from Last 3 Encounters:   21 206 lb (93.4 kg)   21 211 lb (95.7 kg)   20 207 lb 12.8 oz (94.3 kg)        Physical Exam  Exam:  Const: Appears comfortable. No signs of acute distress present. Head/Face: Atraumatic on inspection. Eyes: EOMI in both eyes. No discharge from the eyes. PERRL. Sclerae clear. ENMT: Auditory canals normal. Tympanic membranes: intact and translucent. External nose WNL. Nasal mucosa is clear. Oropharynx: No erythema or exudate. Posterior pharynx is normal.  Neck: Supple. Palpation reveals no adenopathy. No masses appreciated.  No JVD. Carotids: no  bruits. Resp: Respirations are unlabored. Clear to auscultation. No rales, rhonchi or wheezes appreciated  over the lungs bilaterally. CV: Rate is regular. Rhythm is regular. No gallop or rubs. No heart murmur appreciated. Extremities: No clubbing, cyanosis, or edema. No calf inflammation or tenderness. Abdomen: Bowel sounds are normoactive. Abdomen is soft, nontender, and nondistended. No  abdominal masses. No palpable hepatosplenomegaly. Lymph: No palpable or visible regional lymphadenopathy. Musculoskeletal: no acute joint inflammation except chronic low back pain with limited range of motion. . Crepitus bilateral knees  Skin: Dry and warm with no rash. Skin normal to inspection and palpation overall. Neuro: Alert and oriented. Affect: appropriate. Upper Extremities: 5/5 bilaterally. Lower Extremities:  5/5 bilaterally. Sensation intact to light touch. Reflexes: DTR's are symmetric and 2+ bilaterally. .  Cranial Nerves: Cranial nerves grossly intact. Assessment and Plan:   Diagnosis Orders   1. Chronic gout without tophus, unspecified cause, unspecified site  allopurinol (ZYLOPRIM) 300 MG tablet    colchicine (COLCRYS) 0.6 MG tablet    Uric Acid   2. Gastroesophageal reflux disease without esophagitis     3. Anxiety and depression  FLUoxetine (PROZAC) 20 MG capsule   4. Essential hypertension  metoprolol succinate (TOPROL XL) 25 MG extended release tablet   5. Mixed hyperlipidemia  rosuvastatin (CRESTOR) 10 MG tablet   6. Chronic obstructive pulmonary disease, unspecified COPD type (HCC)  tiotropium (SPIRIVA) 18 MCG inhalation capsule    albuterol sulfate  (90 Base) MCG/ACT inhaler   7. Vitamin D deficiency  vitamin D (ERGOCALCIFEROL) 1.25 MG (72682 UT) CAPS capsule   8. Senile cataract of left eye, unspecified age-related cataract type  External Referral To Ophthalmology   9. Coagulation defect ([facility] [address]     10.  Type 2 diabetes mellitus with hyperglycemia, without long-term current use of insulin ([facility]     11. Severe obesity (BMI 35.0-35.9 with comorbidity) ([facility]     12. Screening for colon cancer  POCT FECAL IMMUNOCHEMICAL TEST (FIT)   13. Personal history of tobacco use  TX VISIT TO DISCUSS LUNG CA SCREEN W LDCT    CT Lung Screening   14. Coronary artery disease involving native coronary artery of native heart without angina pectoris  Ambulatory referral to Cardiology   15. Primary osteoarthritis of both knees  XR KNEE LEFT (MIN 4 VIEWS)    XR KNEE RIGHT (MIN 4 VIEWS)    Vincent Morales MD, Orthopaedics, [place] ([facility])       Severe obesity (BMI 35.0-35.9 with comorbidity) ([facility]  Counseled, risk reviewed. Lifestyle modification reviewed. Declines formal intervention. Age-related cataract of left eye  Refer eye care     Hypertension  Care Plan:  Comments : His wife states he has significant whitecoat hypertension, but is excellent at home. Tolerating therapy. Risk of HTN reviewed. lifestyle modification emphasized. hyper and hypotensive precautions reviewed pulse parameters also  reviewed. Declines ACE inhibitor or ARB. Refills given        Other hyperlipidemia  Care Plan:  Comments : Tolerating Crestor. Dose-dependent benefits reviewed goals reviewed. Declines change in therapy. Tolerating well. .SE's/Instructions/Risks/Benefits reviewed. If  ADR's, D/C and notify. The risks  and benefits of Vitamin D3 and Coenzyme Q-10 supplementation reviewed. risk of hyperlipidemia reviewed lifestyle modification reviewed. Goals reviewed. Declines change in therapy. Previously on Lipitor but unaffordable.     Liver disease, unspecified  Care Plan:  Comments : Counseled extensively. Differential reviewed, including serious etiologies. Likely fatty liver. Precautions reviewed. Lifestyle modification appropriate diet  and weight loss reviewed. Risks of even this reviewed.   Other than basic monitoring on interested in other evaluation or treatment,  in-depth blood work, imaging or otherwise. Hepatitis C screen negative. LFTs normalized. Defers  further evaluation or treatment otherwise     Hematuria, unspecified  Care Plan:  Comments : Counseled extensively. Differential reviewed, including serious etiologies. Asymptomatic. Continue per Dr. Susan Stanley . higher risk given  tobacco use reviewed. Although I have encouraged him to follow-up with Dr. Susan Stanley but, he is declined. Urinalysis now normal.  His wife states that he will always have this blood, several family members also have this. Encounter for general adult medical examination with bnormal  findings  Care Plan:  Comments : Health maintenance issues discussed at length 5/18. Encouraged yearly  Physicals. He will schedule an annual wellness in about 1 month. Had Pneumovax 1/20, plan/consider Prevnar thirteen 1/21 Fit cards. Agrees to LDCT at this time, refuses colonoscopy at this time but will consider. Atherosclerotic heart disease of native coronary artery without angina  pectoris  Care Plan:  Comments : Status post CABG. asx. encouraged fu w/ cardiologist, previously dr Del [facility],  declines. currently off ACE inhibitor/ARB and declines at this time      Type 2 diabetes mellitus with hyperglycemia  Care Plan:  Comments :  extensively. watch BS closely ambulatory. Parameters given. Call if not  in range. ER if dangerous numbers. Macro and microvascular complications  reviewed. Importance of at least yearly eye exams and daily foot exams  reviewed. Risks and benefits of insulin sensitizers reviewed and he declines  now. Fasting glucose remains elevated but A1c excellent at 5.7-5.5-5.6         Gout, unspecified  Care Plan:  Comments : Emphasized low uric acid diet. Compliance on allopurinol has not been taking. Proper hydration. He has colcrys prn,2×1 at onset of  symptoms and repeat one hour later. .  reviewed. monitor uric acid. Uses infrequently. Goals reviewed. On allopurinol 300 mg daily.   Uric acid is improved but still not at goal.      Chronic obstructive pulmonary disease, unspecified  Care Plan:  Comments : Asymptomatic. Willing now to have LDCT, R/P reviewed, defers pulmonary Function Tests, referral.  Currently on  Spiriva and p.r.n. Proair which he rarely needs although a little more in the fall.     Intervertebral disc disorders with myelopathy, lumbar region  Care Plan:   Status post injections through all points, then surgery with  discectomy/laminectomy through Dr. Katy Manning. Continue per them. h/o PT     Vitamin D deficiency, unspecified  Care Plan:  Comments : Stable on prescription vitamin D,, continue monitoring     Disorder of prostate, unspecified  Care Plan:  Comments : Counseled extensively. Differential reviewed, including serious etiologies. rising  PSA is trending down from 2.6-2.2-3.53-1. 81. He should follow-up with Dr. Martine Jane, deferring now       Secondary polycythemia  Care Plan:  Comments : Likely related to smoking, other differential reviewed. Monitor. Declines further  eval/tx. currently normal    Dysthymic disorder  Care Plan:  Comments : Stable. Continue current therapy. r/b meds reviewed.     Gastro-esophageal reflux disease without esophagitis  Care Plan:  Comments : Asymptomatic as long as taking therapy. Declines further testing and  understands risk of masking underlying etiologies. Risks of meds also reviewed  including calcium malabsorption .     CRI-  Counseled. Proper hydration. Avoid nephrotoxic agents monitor. Declines imaging or referral creatinine stable, currently normal         Tobacco abuse-counseled extensively on importance of abstinence. States he gets chest x-ray and PFTs at work but, defers REF. Now willing to get LDCT declines abstinence or assistance.      Bilateral knee OA  Failed conservative measures. Would like referral to [address] [place] [address] for possible surgery. He will need cleared with cardiology first.  Check x-ray. Topicals reviewed.   R/B met   Age 50-69   Pack year smoking >30   Still smoking or less than 15 year since quit   No sign or symptoms of lung cancer   > 11 months since last LDCT     Risks and benefits of lung cancer screening with LDCT scans discussed:    Significance of positive screen - False-positive LDCT results often occur. 95% of all positive results do not lead to a diagnosis of cancer. Usually further imaging can resolve most false-positive results; however, some patients may require invasive procedures. Over diagnosis risk - 10% to 12% of screen-detected lung cancer cases are over diagnosed--that is, the cancer would not have been detected in the patient's lifetime without the screening. Need for follow up screens annually to continue lung cancer screening effectiveness     Risks associated with radiation from annual LDCT- Radiation exposure is about the same as for a mammogram, which is about 1/3 of the annual background radiation exposure from everyday life. Starting screening at age 54 is not likely to increase cancer risk from radiation exposure. Patients with comorbidities resulting in life expectancy of < 10 years, or that would preclude treatment of an abnormality identified on CT, should not be screened due to lack of benefit.     To obtain maximal benefit from this screening, smoking cessation and long-term abstinence from smoking is critical

## 2021-07-12 ENCOUNTER — TELEPHONE (OUTPATIENT)
Dept: CARDIOLOGY CLINIC | Age: 71
End: 2021-07-12

## 2021-07-12 NOTE — TELEPHONE ENCOUNTER
Patient Appointment Form:      PCP: Dr Darek Al  Referring: same    Has the Patient:    Seen a Cardiologist? yes    date:06-19-15  Physician:Dr Madiha Corbett  location:[facility]    Had a heart catheterization? yes   date: over 10 years  Hospital:  [facility]    Had heart surgery? yes   date:  overe 10 years   surgeon: Dr Syeda Thomasonbird   [facility]  [facility]    Had a stress test or nuclear stress test? yes   date: 06-19-15   facility name:  [facility]    Had an echocardiogram? yes   date: 11   [facility]ility [facility]  [facility]    Had a vascular ultrasound? Yes  Date 11  US legs and carotids  In EPIC    Had a 24/48 heart monitor or extended cardiac event monitor?  24hr   date: 13      Who ordered: in EPIC    Had recent blood work in the last 6 months? yes    date: 21    ordering physician: Darek Al    Had a pacemaker/ICD/ILR implant? no    Seen an Electrophysiologist? no        Will send records via: [address]      Date & time of appointment:  21   2:00    [facility]

## 2021-07-26 ENCOUNTER — HOSPITAL ENCOUNTER (OUTPATIENT)
Dept: CT IMAGING | Age: 71
Discharge: HOME | End: 2021-07-28
Payer: MEDICARE

## 2021-07-26 DIAGNOSIS — Z87.891 PERSONAL HISTORY OF TOBACCO USE: ICD-10-CM

## 2021-07-26 PROCEDURE — 71271 CT THORAX LUNG CANCER SCR C-: CPT

## 2021-07-26 NOTE — RESULT ENCOUNTER NOTE
Notify. No suspicious pulmonary lesions. Gallstones noted. Low-fat diet. Notify of symptoms.   Keep follow-up to review more detail

## 2021-08-02 ENCOUNTER — TELEPHONE (OUTPATIENT)
Dept: CASE MANAGEMENT | Age: 71
End: 2021-08-02

## 2021-08-11 ENCOUNTER — VIRTUAL VISIT (OUTPATIENT)
Dept: PRIMARY CARE CLINIC | Age: 71
End: 2021-08-11
Payer: MEDICARE

## 2021-08-11 DIAGNOSIS — E78.2 MIXED HYPERLIPIDEMIA: ICD-10-CM

## 2021-08-11 DIAGNOSIS — Z71.89 ACP (ADVANCE CARE PLANNING): ICD-10-CM

## 2021-08-11 DIAGNOSIS — E11.65 TYPE 2 DIABETES MELLITUS WITH HYPERGLYCEMIA, WITHOUT LONG-TERM CURRENT USE OF INSULIN (HCC): ICD-10-CM

## 2021-08-11 DIAGNOSIS — D75.1 POLYCYTHEMIA: ICD-10-CM

## 2021-08-11 DIAGNOSIS — E55.9 VITAMIN D DEFICIENCY: ICD-10-CM

## 2021-08-11 DIAGNOSIS — Z72.0 TOBACCO USE: ICD-10-CM

## 2021-08-11 DIAGNOSIS — Z12.11 COLON CANCER SCREENING: ICD-10-CM

## 2021-08-11 DIAGNOSIS — I10 ESSENTIAL HYPERTENSION: ICD-10-CM

## 2021-08-11 DIAGNOSIS — Z00.00 ROUTINE GENERAL MEDICAL EXAMINATION AT A HEALTH CARE FACILITY: Primary | ICD-10-CM

## 2021-08-11 PROCEDURE — 4040F PNEUMOC VAC/ADMIN/RCVD: CPT | Performed by: FAMILY MEDICINE

## 2021-08-11 PROCEDURE — 99497 ADVNCD CARE PLAN 30 MIN: CPT | Performed by: FAMILY MEDICINE

## 2021-08-11 PROCEDURE — 3017F COLORECTAL CA SCREEN DOC REV: CPT | Performed by: FAMILY MEDICINE

## 2021-08-11 PROCEDURE — 1123F ACP DISCUSS/DSCN MKR DOCD: CPT | Performed by: FAMILY MEDICINE

## 2021-08-11 PROCEDURE — G0438 PPPS, INITIAL VISIT: HCPCS | Performed by: FAMILY MEDICINE

## 2021-08-11 PROCEDURE — 3044F HG A1C LEVEL LT 7.0%: CPT | Performed by: FAMILY MEDICINE

## 2021-08-11 SDOH — ECONOMIC STABILITY: FOOD INSECURITY: WITHIN THE PAST 12 MONTHS, THE FOOD YOU BOUGHT JUST DIDN'T LAST AND YOU DIDN'T HAVE MONEY TO GET MORE.: PATIENT DECLINED

## 2021-08-11 SDOH — ECONOMIC STABILITY: FOOD INSECURITY: WITHIN THE PAST 12 MONTHS, YOU WORRIED THAT YOUR FOOD WOULD RUN OUT BEFORE YOU GOT MONEY TO BUY MORE.: PATIENT DECLINED

## 2021-08-11 ASSESSMENT — PATIENT HEALTH QUESTIONNAIRE - PHQ9
SUM OF ALL RESPONSES TO PHQ QUESTIONS 1-9: 0
SUM OF ALL RESPONSES TO PHQ QUESTIONS 1-9: 0
2. FEELING DOWN, DEPRESSED OR HOPELESS: 0
SUM OF ALL RESPONSES TO PHQ QUESTIONS 1-9: 0
1. LITTLE INTEREST OR PLEASURE IN DOING THINGS: 0
SUM OF ALL RESPONSES TO PHQ9 QUESTIONS 1 & 2: 0

## 2021-08-11 ASSESSMENT — LIFESTYLE VARIABLES
HOW OFTEN DURING THE LAST YEAR HAVE YOU FOUND THAT YOU WERE NOT ABLE TO STOP DRINKING ONCE YOU HAD STARTED: 0
HAS A RELATIVE, FRIEND, DOCTOR, OR ANOTHER HEALTH PROFESSIONAL EXPRESSED CONCERN ABOUT YOUR DRINKING OR SUGGESTED YOU CUT DOWN: 0
HOW OFTEN DO YOU HAVE SIX OR MORE DRINKS ON ONE OCCASION: 0
HOW OFTEN DURING THE LAST YEAR HAVE YOU NEEDED AN ALCOHOLIC DRINK FIRST THING IN THE MORNING TO GET YOURSELF GOING AFTER A NIGHT OF HEAVY DRINKING: 0
HOW OFTEN DURING THE LAST YEAR HAVE YOU BEEN UNABLE TO REMEMBER WHAT HAPPENED THE NIGHT BEFORE BECAUSE YOU HAD BEEN DRINKING: 0
AUDIT TOTAL SCORE: 1
HOW MANY STANDARD DRINKS CONTAINING ALCOHOL DO YOU HAVE ON A TYPICAL DAY: 0
AUDIT-C TOTAL SCORE: 1
HAVE YOU OR SOMEONE ELSE BEEN INJURED AS A RESULT OF YOUR DRINKING: 0
HOW OFTEN DURING THE LAST YEAR HAVE YOU HAD A FEELING OF GUILT OR REMORSE AFTER DRINKING: 0
HOW OFTEN DURING THE LAST YEAR HAVE YOU FAILED TO DO WHAT WAS NORMALLY EXPECTED FROM YOU BECAUSE OF DRINKING: 0
HOW OFTEN DO YOU HAVE A DRINK CONTAINING ALCOHOL: 1

## 2021-08-11 ASSESSMENT — SOCIAL DETERMINANTS OF HEALTH (SDOH): HOW HARD IS IT FOR YOU TO PAY FOR THE VERY BASICS LIKE FOOD, HOUSING, MEDICAL CARE, AND HEATING?: PATIENT DECLINED

## 2021-08-11 NOTE — PATIENT INSTRUCTIONS
Personalized Preventive Plan for Barney Cervantes - 8/11/2021  Medicare offers a range of preventive health benefits. Some of the tests and screenings are paid in full while other may be subject to a deductible, co-insurance, and/or copay. Some of these benefits include a comprehensive review of your medical history including lifestyle, illnesses that may run in your family, and various assessments and screenings as appropriate. After reviewing your medical record and screening and assessments performed today your provider may have ordered immunizations, labs, imaging, and/or referrals for you. A list of these orders (if applicable) as well as your Preventive Care list are included within your After Visit Summary for your review. Other Preventive Recommendations:    · A preventive eye exam performed by an eye specialist is recommended every 1-2 years to screen for glaucoma; cataracts, macular degeneration, and other eye disorders. · A preventive dental visit is recommended every 6 months. · Try to get at least 150 minutes of exercise per week or 10,000 steps per day on a pedometer . · Order or download the FREE \"Exercise & Physical Activity: Your Everyday Guide\" from [employer]. Call 7-178.775.5437 or search [employer] online. · You need 9776-4260 mg of calcium and 2987-3941 IU of vitamin D per day. It is possible to meet your calcium requirement with diet alone, but a vitamin D supplement is usually necessary to meet this goal.  · When exposed to the sun, use a sunscreen that protects against both UVA and UVB radiation with an SPF of 30 or greater. Reapply every 2 to 3 hours or after sweating, drying off with a towel, or swimming. · Always wear a seat belt when traveling in a car. Always wear a helmet when riding a bicycle or motorcycle.

## 2021-08-11 NOTE — PROGRESS NOTES
Medicare Annual Wellness Visit  Name: Madelaine Oseguera Date: 2021   MRN: [id number] Sex: Male   Age: 73 y.o. Ethnicity: Non- / Non    : 1950 Race: White (non-)      Rene Modi is here for Medicare AWV    Screenings for behavioral, psychosocial and functional/safety risks, and cognitive dysfunction are all negative except as indicated below. These results, as well as other patient data from the [address] form, are documented in Flowsheets linked to this Encounter. Overall feels well. Chronic knee pain. Sees cardiology and Ortho on Monday. Significant arthritis on x-ray. LDCT reviewed. No suspicious lung nodules but gallstones. Asymptomatic. Defers further evaluation or treatment of this. Signs symptoms watch were reviewed. Last modification emphasized. Low-fat diet emphasized      Health Maintenance: Well balanced/proper diet reviewed. Counseled on MVI, fiber, b-complex,  calcium and fish oils (including pros/cons of OTC vs Rx). Exercise recommendations reviewed. Reviewed recommendations regarding Td (Tdap) (10/13), pneumovax (10/14, prevnar 13  Recommended, he will consider. ), flu shot (seasonal), Hepatitis vaccines and shingles vaccine (shingrix) (depends on $$$). Importance of Regular  Eye and Dental exams reviewed. Risks/Benefits of ASA reviewed and discussed current  recommendations. Caffeine risks/limits and Sun protection reviewed. Notify if any new or changing  moles/skin lesions etc. Dexa Scan indications/ risk factors for osteoportic fractures and prevention  reviewed. Agreed then declined based on lack of insurance coverage. Counseled on testicular exams, prostate exams. Colonoscopy recommendations  reviewed. Pt denies change in bowel habbits or [facility] of colon polyps/CA. Declines cscope but willing to have cologuard. Heme Cards  Provided. Past EKG reviewed with pt. Has appt with  [facility] Monday. Reviewed indications for PFT's.  Also counseled on indications for imaging  including Brain, carotid, chest, Abdominal, and  otherwise Defers PVS.  AAA screen neg 2018. LDCT neg 8/21 except gallstones Health Fairs Reviewed    No Known Allergies      Prior to Visit Medications    Medication Sig Taking? Authorizing Provider   allopurinol (ZYLOPRIM) 300 MG tablet Take 1 tablet by mouth daily Yes Reese Ta MD   colchicine (COLCRYS) 0.6 MG tablet Two tablets by mouth x 1 at first sign of gout, then one tablet one hour later Yes Reese Ta MD   FLUoxetine (PROZAC) 20 MG capsule Take 1 capsule by mouth daily Yes Reese Ta MD   metoprolol succinate (TOPROL XL) 25 MG extended release tablet Take 0.5 tablets by mouth daily Yes Reese Ta MD   rosuvastatin (CRESTOR) 10 MG tablet Take 1 tablet by mouth daily Yes Reese Ta MD   tiotropium (SPIRIVA) 18 MCG inhalation capsule Inhale 1 capsule into the lungs daily Yes Reese Ta MD   albuterol sulfate  (90 Base) MCG/ACT inhaler 1-2 puffs q4-6hrs prn Yes Reese Ta MD   vitamin D (ERGOCALCIFEROL) 1.25 MG (54159 UT) CAPS capsule Take 1 capsule by mouth once a week Yes Reese Ta MD   famotidine (PEPCID) 20 MG tablet Take 1 tablet by mouth daily Yes Reese Ta MD   aspirin 81 MG EC tablet Take 81 mg by mouth daily. Yes Historical Provider, MD         Past Medical History:   Diagnosis Date    CAD (coronary artery disease)     COPD (chronic obstructive pulmonary disease) ([facility]     Hyperlipidemia     Hypertension        Past Surgical History:   Procedure Laterality Date    CORONARY ARTERY BYPASS GRAFT  06/09/11    ACB X 5- DR. ROBLEDO    DIAGNOSTIC CARDIAC CATH LAB PROCEDURE  06/08/11    DR. VÁSQUEZ    TONSILLECTOMY AND ADENOIDECTOMY           Family History   Problem Relation Age of Onset    Thyroid Disease Mother     Heart Attack Father 61    Hypertension Father     Hypertension Sister     Heart Surgery Sister     Cirrhosis Brother     Hypertension Sister     Heart Surgery Sister     Heart Surgery Sister     Hypertension Sister        CareTeam (Including outside providers/suppliers regularly involved in providing care):   Patient Care Team:  Poonam Paulson MD as PCP - [address] [place], [place] [address], MD as PCP - [facility]  Radha Ellis MD (Cardiology)  Nai Leos DO as Surgeon (Cardiothoracic Surgery)  Brittany Bennett MD as Surgeon (General Surgery)  Jaycee Webber as Imaging Navigator    Wt Readings from Last 3 Encounters:   07/09/21 206 lb (93.4 kg)   01/25/21 211 lb (95.7 kg)   01/21/20 207 lb 12.8 oz (94.3 kg)     Patient-Reported Vitals 7/27/2020   Patient-Reported Weight 210 lb   Patient-Reported Height 5'4      There is no height or weight on file to calculate BMI. Based upon direct observation of the patient, evaluation of cognition reveals recent and remote memory intact. Patient's complete Health Risk Assessment and screening values have been reviewed and are found in Flowsheets. The following problems were reviewed today and where indicated follow up appointments were made and/or referrals ordered. Positive Risk Factor Screenings with Interventions:         Substance History:  Social History     Tobacco History     Smoking Status  Current Every Day Smoker Smoking Start Date  1/1/1970 Smoking Frequency  1 pack/day for 45 years (39 pk yrs) Smoking Tobacco Type  Cigarettes    Smokeless Tobacco Use  Never Used          Alcohol History     Alcohol Use Status  Yes Comment  occassionally on holidays          Drug Use     Drug Use Status  No          Sexual Activity     Sexually Active  Not Asked               Alcohol Screening: Audit-C Score: 1  Total Score: 1    A score of 8 or more is associated with harmful or hazardous drinking. A score of 13 or more in women, and 15 or more in men, is likely to indicate alcohol dependence.   Substance Abuse Interventions:  · Tobacco abuse:  tobacco cessation tips and resources provided    General Health and ACP:  General  In general, how would you say your health is?: Good  In the past 7 days, have you experienced any of the following?  New or Increased Pain, New or Increased Fatigue, Loneliness, Social Isolation, Stress or Anger?: (!) Stress  Do you get the social and emotional support that you need?: Yes  Do you have a Living Will?: (!) No  Advance Directives     Power of [facility] Will ACP-Advance Directive ACP-Power of     Not on File Not on File Not on File Not on File      General Health Risk Interventions:  · counseled    Health Habits/Nutrition:  Health Habits/Nutrition  Do you exercise for at least 20 minutes 2-3 times per week?: Yes  Have you lost any weight without trying in the past 3 months?: No  Do you eat only one meal per day?: No  Have you seen the dentist within the past year?: (!) No     Health Habits/Nutrition Interventions:  · Dental exam overdue:  patient encouraged to make appointment with his/her dentist    Hearing/Vision:  No exam data present  Hearing/Vision  Do you or your family notice any trouble with your hearing that hasn't been managed with hearing aids?: (!) Yes  Do you have difficulty driving, watching TV, or doing any of your daily activities because of your eyesight?: (!) Yes  Have you had an eye exam within the past year?: Appointment is scheduled  Hearing/Vision Interventions:  · Hearing concerns:  patient declines any further evaluation/treatment for hearing issues  · Vision concerns:  sees eye  in sept for cataracts    Safety:  Safety  Do you have working smoke detectors?: Yes  Have all throw rugs been removed or fastened?: Yes  Do you have non-slip mats or surfaces in all bathtubs/showers?: (!) No  Do all of your stairways have a railing or banister?: Yes  Are your doorways, halls and stairs free of clutter?: Yes  Do you always fasten your seatbelt when you are in a car?: Yes  Safety Interventions:  · Home safety tips provided Personalized Preventive Plan   Current Health Maintenance Status  Immunization History   Administered Date(s) Administered    COVID-19, Pfizer, PF, 30mcg/0.3mL 02/21/2021, 03/15/2021    Influenza Virus Vaccine 10/22/2013, 09/30/2015    Influenza, Quadv, IM, PF (6 mo and older Fluzone, Flulaval, Fluarix, and 3 yrs and older Afluria) 09/22/2020    Influenza, Triv, inactivated, subunit, adjuvanted, IM (Fluad 65 yrs and older) 01/21/2020    Pneumococcal Polysaccharide ([id number]) 06/04/2014, 01/21/2020    Tdap (Boostrix, Adacel) 10/22/2013        Health Maintenance   Topic Date Due    Diabetic foot exam  Never done    Diabetic retinal exam  Never done    Colon cancer screen colonoscopy  Never done    Shingles Vaccine (1 of 2) Never done    Annual Wellness Visit (AWV)  Never done    Flu vaccine (1) 09/01/2021    A1C test (Diabetic or Prediabetic)  07/02/2022    Diabetic microalbuminuria test  07/02/2022    Lipid screen  07/02/2022    Low dose CT lung screening  07/26/2022    DTaP/Tdap/Td vaccine (2 - Td or Tdap) 10/22/2023    Pneumococcal 65+ years Vaccine  Completed    COVID-19 Vaccine  Completed    AAA screen  Completed    Hepatitis C screen  Completed    Hepatitis A vaccine  Aged Out    Hib vaccine  Aged Out    Meningococcal (ACWY) vaccine  Aged Out     Recommendations for [employer] Due: see orders and patient instructions/AVS.  . Recommended screening schedule for the next 5-10 years is provided to the patient in written form: see Patient Instructions/AVS.    Kayden [place] was seen today for medicare awv. Diagnoses and all orders for this visit:    Routine general medical examination at a health care facility    Tobacco use  -     Cancel: DEXA BONE DENSITY AXIAL SKELETON; Future  -     TSH without Reflex; Future    Colon cancer screening  -     POCT Fecal Immunochemical Test (FIT); Future  -     Cologuard; Future  -     TSH without Reflex;  Future    Essential hypertension  -     TSH without Reflex; Future    Mixed hyperlipidemia  -     Lipid Panel; Future  -     TSH without Reflex; Future  -     Comprehensive Metabolic Panel; Future  -     CK; Future    Vitamin D deficiency  -     TSH without Reflex; Future  -     Vitamin D 25 Hydroxy; Future    Type 2 diabetes mellitus with hyperglycemia, without long-term current use of insulin (HCC)  -     TSH without Reflex; Future  -     Hemoglobin A1C; Future  -     Urinalysis; Future  -     Microalbumin / Creatinine Urine Ratio; Future    Polycythemia  -     TSH without Reflex; Future  -     CBC Auto Differential; Future    ACP (advance care planning)  -     AR ADVANCED CARE PLAN FACE TO FACE, 1ST 30MIN [id number]           Assessment and Plan:   Diagnosis Orders   1. Routine general medical examination at a health care facility     2. Tobacco use  TSH without Reflex   3. Colon cancer screening  POCT Fecal Immunochemical Test (FIT)    Cologuard    TSH without Reflex   4. Essential hypertension  TSH without Reflex   5. Mixed hyperlipidemia  Lipid Panel    TSH without Reflex    Comprehensive Metabolic Panel    CK   6. Vitamin D deficiency  TSH without Reflex    Vitamin D 25 Hydroxy   7. Type 2 diabetes mellitus with hyperglycemia, without long-term current use of insulin (HCC)  TSH without Reflex    Hemoglobin A1C    Urinalysis    Microalbumin / Creatinine Urine Ratio   8. Polycythemia  TSH without Reflex    CBC Auto Differential   9. ACP (advance care planning)  AR ADVANCED CARE PLAN FACE TO FACE, 1ST 30MIN [id number]          No problem-specific Assessment & Plan notes found for this encounter. Health maintenance issues discussed at length as above, 8/11/2021 . Encouraged yearly physicals. Other medical diagnoses reviewed as above and blood work ordered for October at which time he is going to keep his appointment as previously scheduled, sooner as needed. Continue per multiple specialist.  Precautions reviewed. Plan as above.   Counseled extensively to substitute for in-person clinic visit. Patient and provider were located at their individual homes. --Mikel De La Cruz MD on 8/11/2021 at 4:25 PM    An electronic signature was used to authenticate this note. Advance Care Planning   Advanced Care Planning: Discussed the patients choices for care and treatment in case of a health event that adversely affects decision-making abilities. Also discussed the patients long-term treatment options. Reviewed the process of designating a competent adult as an Agent (or -in-fact) that could take make health care decisions for the patient if incompetent. Patient was asked to complete the declaration forms, either acknowledge the forms by a public notary or an eligible witness and provide a signed copy to the practice office.   Time spent (minutes): 5

## 2021-08-16 ENCOUNTER — OFFICE VISIT (OUTPATIENT)
Dept: CARDIOLOGY CLINIC | Age: 71
End: 2021-08-16
Payer: MEDICARE

## 2021-08-16 VITALS
HEIGHT: 64 IN | RESPIRATION RATE: 18 BRPM | WEIGHT: 205 LBS | HEART RATE: 65 BPM | BODY MASS INDEX: 35 KG/M2 | SYSTOLIC BLOOD PRESSURE: 136 MMHG | DIASTOLIC BLOOD PRESSURE: 78 MMHG

## 2021-08-16 DIAGNOSIS — Z95.1 HX OF CABG: ICD-10-CM

## 2021-08-16 DIAGNOSIS — I25.10 CORONARY ARTERY DISEASE INVOLVING NATIVE CORONARY ARTERY OF NATIVE HEART WITHOUT ANGINA PECTORIS: ICD-10-CM

## 2021-08-16 DIAGNOSIS — E78.00 PURE HYPERCHOLESTEROLEMIA: ICD-10-CM

## 2021-08-16 DIAGNOSIS — I10 ESSENTIAL HYPERTENSION: ICD-10-CM

## 2021-08-16 DIAGNOSIS — R06.09 DYSPNEA ON EXERTION: ICD-10-CM

## 2021-08-16 DIAGNOSIS — Z01.810 PREOP CARDIOVASCULAR EXAM: Primary | ICD-10-CM

## 2021-08-16 PROCEDURE — 4004F PT TOBACCO SCREEN RCVD TLK: CPT | Performed by: INTERNAL MEDICINE

## 2021-08-16 PROCEDURE — 93000 ELECTROCARDIOGRAM COMPLETE: CPT | Performed by: INTERNAL MEDICINE

## 2021-08-16 PROCEDURE — G8427 DOCREV CUR MEDS BY ELIG CLIN: HCPCS | Performed by: INTERNAL MEDICINE

## 2021-08-16 PROCEDURE — G8417 CALC BMI ABV UP PARAM F/U: HCPCS | Performed by: INTERNAL MEDICINE

## 2021-08-16 PROCEDURE — 1123F ACP DISCUSS/DSCN MKR DOCD: CPT | Performed by: INTERNAL MEDICINE

## 2021-08-16 PROCEDURE — 99204 OFFICE O/P NEW MOD 45 MIN: CPT | Performed by: INTERNAL MEDICINE

## 2021-08-16 PROCEDURE — 4040F PNEUMOC VAC/ADMIN/RCVD: CPT | Performed by: INTERNAL MEDICINE

## 2021-08-16 PROCEDURE — 3017F COLORECTAL CA SCREEN DOC REV: CPT | Performed by: INTERNAL MEDICINE

## 2021-08-16 NOTE — PROGRESS NOTES
Patient Active Problem List   Diagnosis    CAD (coronary artery disease)    Hypertension    Hyperlipidemia    Liver disease    Asymptomatic microscopic hematuria    Type 2 diabetes mellitus with hyperglycemia, without long-term current use of insulin (HCC)    Chronic gout without tophus    DDD (degenerative disc disease), lumbosacral    Polycythemia    Anxiety and depression    Gastroesophageal reflux disease    Vitamin D deficiency    Obstructive chronic bronchitis without exacerbation ([facility])    Chronic renal impairment    Tobacco abuse    Prostate disorder    Age-related cataract of left eye    Morbidly obese ([address]    Coagulation defect ([address]    Primary osteoarthritis of both knees    Severe obesity (BMI 35.0-35.9 with comorbidity) ([facility])       Current Outpatient Medications   Medication Sig Dispense Refill    allopurinol (ZYLOPRIM) 300 MG tablet Take 1 tablet by mouth daily 90 tablet 3    colchicine (COLCRYS) 0.6 MG tablet Two tablets by mouth x 1 at first sign of gout, then one tablet one hour later 30 tablet 0    FLUoxetine (PROZAC) 20 MG capsule Take 1 capsule by mouth daily 90 capsule 3    metoprolol succinate (TOPROL XL) 25 MG extended release tablet Take 0.5 tablets by mouth daily 45 tablet 3    rosuvastatin (CRESTOR) 10 MG tablet Take 1 tablet by mouth daily 90 tablet 3    tiotropium (SPIRIVA) 18 MCG inhalation capsule Inhale 1 capsule into the lungs daily 90 capsule 3    albuterol sulfate  (90 Base) MCG/ACT inhaler 1-2 puffs q4-6hrs prn 3 Inhaler 6    vitamin D (ERGOCALCIFEROL) 1.25 MG (67431 UT) CAPS capsule Take 1 capsule by mouth once a week 12 capsule 3    famotidine (PEPCID) 20 MG tablet Take 1 tablet by mouth daily 90 tablet 3    aspirin 81 MG EC tablet Take 81 mg by mouth daily. No current facility-administered medications for this visit. CC:    Patient is seen in Initial Evaluation for:  1. Preop cardiovascular exam    2.  Coronary artery disease involving native coronary artery of native heart without angina pectoris    3. Essential hypertension    4. Pure hypercholesterolemia    5. Hx of CABG    6. Dyspnea on exertion        HPI:  Patient is seen in initial evaluation preoperatively. His last cardiology evaluation was 2015. He has a history of coronary artery bypass graft surgery in 2011 and is scheduled for bilateral knee surgery. His activities are limited and he does have difficulties with shortness of breath climbing a flight of stairs. He denies any chest pain. No palpitations heart racing or fluttering. He is tolerating his current medications well.       ROS:   General: No unusual weight gain,  change in exercise tolerance  Skin: No rash or itching  EENT: No vision changes or nosebleeds  Cardiovascular: No orthopnea or paroxysmal nocturnal dyspnea  Respiratory: No cough or hemoptysis  Gastrointestinal: No hematemesis or recent changes in bowel habits  Genitourinary: No hematuria, urgency or frequency  Musculoskeletal: No muscular weakness or joint swelling   Neurologic / Psychiatric: No incoordination or convulsions  Allergic / Immunologic/ Lymphatic / Endocrine: No anemia or bleeding tendency    Social History     Socioeconomic History    Marital status:      Spouse name: Not on file    Number of children: Not on file    Years of education: Not on file    Highest education level: Not on file   Occupational History    Not on file   Tobacco Use    Smoking status: Current Every Day Smoker     Packs/day: 1.00     Years: 45.00     Pack years: 45.00     Types: Cigarettes     Start date: 1/1/1970    Smokeless tobacco: Never Used   Substance and Sexual Activity    Alcohol use: Yes     Comment: occassionally on holidays    Drug use: No    Sexual activity: Not on file   Other Topics Concern    Not on file   Social History Narrative        PMH:    Problem List: 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 FH:    Father:     age 61 - MI - hypertension. Mother:     age 66 - post-op complications - thyroid, reflux - post op after hip fx (septic). Brother -  52's cirrhosis from alcoholism    Brother (twin identical) htn (he follows with physicians regularly)    Sisters x 3 - all with HTN and 1 with CABG age 71 (stent 76)    Reviewed, no changes. SH:    Marital: Legal Status: . Previously Worked as , asbestos exposure, follows with work physicians    Personal Habits: Cigarette Use: Former Cigarette Smoker - quit ., now smoking again as of . Alcohol: Denies alcohol use. Social Determinants of Health     Financial Resource Strain: Unknown    Difficulty of Paying Living Expenses: Patient refused   Food Insecurity: Unknown    Worried About Running Out of Food in the Last Year: Patient refused    [address] in the Last Year: Patient refused   Transportation Needs:     Lack of Transportation (Medical):      Lack of Transportation (Non-Medical):    Physical Activity:     Days of Exercise per Week:     Minutes of Exercise per Session:    Stress:     Feeling of Stress :    Social Connections:     Frequency of Communication with Friends and Family:     Frequency of Social Gatherings with Friends and Family:     Attends Religion Services:     Active Member of Clubs or Organizations:     Attends Club or Organization Meetings:     Marital Status:    Intimate Partner Violence:     Fear of Current or Ex-Partner:     Emotionally Abused:     Physically Abused:     Sexually Abused:        Family History   Problem Relation Age of Onset    Thyroid Disease Mother     Heart Attack Father 61    Hypertension Father     Hypertension Sister     Heart Surgery Sister     Cirrhosis Brother     Hypertension Sister     Heart Surgery Sister     Heart Surgery Sister     Hypertension Sister        Past Medical History:   Diagnosis Date    CAD (coronary artery disease)     COPD (chronic obstructive pulmonary disease) ([address]     Hyperlipidemia     Hypertension        PHYSICAL EXAM:  CONSTITUTIONAL:  Well developed, well nourished    Vitals:    08/16/21 1336   BP: 136/78   Pulse: 65   Resp: 18   Weight: 205 lb (93 kg)   Height: 5' 4\" (1.626 m)     HEAD & FACE: Normocephalic. Symmetric. EYES: No xanthelasma. Conjunctivae not injected. EARS, NOSE, MOUTH & THROAT: Good dentition. No oral pallor or cyanosis. NECK: No JVD at 30 degrees. No thyromegaly. RESPIRATORY: Clear to auscultation and percussion in all fields. No use of accessory muscle or intercostal retractions. CARDIOVASCULAR: Regular rate and rhythm. No lifts or thrills on palpitation. Auscultation with normal S1-S2 in intensity and splitting. No carotid bruits. Abdominal aorta not enlarged. Femoral arteries without bruits. Pedal pulses 2+. No edema. ABDOMEN: Soft without hepatic or splenic enlargement. No tenderness. MUSCULOSKELETAL: No kyphosis or scoliosis of the back. Good muscle strength and tone. No muscle atrophy. Normal gait and ability to undergo exercise stress testing. EXTREMITIES: No clubbing or cyanosis. SKIN: No Xanthomas or ulcerations. NEUROLOGIC: Oriented to time, place and person. Normal mood and affect. LYMPHATIC:  No palpable neck or supraclavicular nodes. No splenomegaly. EKG: the EKG tracing was reviewed and found to reveal: Normal sinus rhythm.  ms. No change compared to prior tracing. ASSESSMENT:                                                     ORDERS:       Diagnosis Orders   1. Preop cardiovascular exam     2. Coronary artery disease involving native coronary artery of native heart without angina pectoris  EKG 12 lead   3. Essential hypertension  EKG 12 lead   4. Pure hypercholesterolemia     5. Hx of CABG     6. Dyspnea on exertion  NM Cardiac Stress Test Nuclear Imaging    Cardiac Stress Test - w/Pharm   Will require further evaluation preoperatively. Due to his limited ambulation abilities nuclear imaging will be needed. PLAN:   See above orders. Medication reconciliation completed. Old records were reviewed and found to reveal: LDL 57 on 7/2/2021. Counseled on smoking cessation. Discussed issues that would prompt earlier evaluation. Same cardiac medications. Follow-up office visit in 1 year -pending above evaluation. Royer Baez

## 2021-08-19 ENCOUNTER — TELEPHONE (OUTPATIENT)
Dept: CARDIOLOGY | Age: 71
End: 2021-08-19

## 2021-08-23 ENCOUNTER — TELEPHONE (OUTPATIENT)
Dept: CARDIOLOGY | Age: 71
End: 2021-08-23

## 2021-08-23 NOTE — TELEPHONE ENCOUNTER
RETURNING PATIENTS WIFE'S PHONE CALL TO SCHEDULE STRESS TEST FOR 08-30-21. REVIEWED COVID CHECKLIST WITH PATIENT.     Electronically signed by Christella Sever on 8/23/2021 at 11:53 AM

## 2021-08-26 ENCOUNTER — TELEPHONE (OUTPATIENT)
Dept: CARDIOLOGY | Age: 71
End: 2021-08-26

## 2021-08-26 NOTE — TELEPHONE ENCOUNTER
[id number] 08/26/21 Unable to contact at both listed numbers (home and mobile), left message for Barney Cervantes  Reminder Call for  Lacy Agrawal Stress test 08/30/21 @ 0511  included Pre procedure stress instructions and COVID check list. Instructed to not take Toprol  8 hors prior to the stress test, bring Toprol to restart after stress test.  Also bring inhalers. Encouraged to return  phone call if any questions.

## 2021-08-30 ENCOUNTER — TELEPHONE (OUTPATIENT)
Dept: CARDIOLOGY CLINIC | Age: 71
End: 2021-08-30

## 2021-08-30 ENCOUNTER — HOSPITAL ENCOUNTER (OUTPATIENT)
Dept: CARDIOLOGY | Age: 71
Discharge: HOME | End: 2021-08-30
Payer: MEDICARE

## 2021-08-30 VITALS
WEIGHT: 205 LBS | HEART RATE: 53 BPM | BODY MASS INDEX: 35 KG/M2 | DIASTOLIC BLOOD PRESSURE: 70 MMHG | HEIGHT: 64 IN | RESPIRATION RATE: 16 BRPM | SYSTOLIC BLOOD PRESSURE: 128 MMHG

## 2021-08-30 PROCEDURE — 3430000000 HC RX DIAGNOSTIC RADIOPHARMACEUTICAL: Performed by: INTERNAL MEDICINE

## 2021-08-30 PROCEDURE — 6360000002 HC RX W HCPCS: Performed by: INTERNAL MEDICINE

## 2021-08-30 PROCEDURE — 78452 HT MUSCLE IMAGE SPECT MULT: CPT

## 2021-08-30 PROCEDURE — A9500 TC99M SESTAMIBI: HCPCS | Performed by: INTERNAL MEDICINE

## 2021-08-30 PROCEDURE — 93017 CV STRESS TEST TRACING ONLY: CPT

## 2021-08-30 PROCEDURE — 2580000003 HC RX 258: Performed by: INTERNAL MEDICINE

## 2021-08-30 RX ORDER — SODIUM CHLORIDE 0.9 % (FLUSH) 0.9 %
10 SYRINGE (ML) INJECTION PRN
Status: DISCONTINUED | OUTPATIENT
Start: 2021-08-30 | End: 2021-08-31 | Stop reason: HOSPADM

## 2021-08-30 RX ADMIN — Medication 10.2 MILLICURIE: at 07:48

## 2021-08-30 RX ADMIN — SODIUM CHLORIDE, PRESERVATIVE FREE 10 ML: 5 INJECTION INTRAVENOUS at 07:49

## 2021-08-30 RX ADMIN — SODIUM CHLORIDE, PRESERVATIVE FREE 10 ML: 5 INJECTION INTRAVENOUS at 09:55

## 2021-08-30 RX ADMIN — SODIUM CHLORIDE, PRESERVATIVE FREE 10 ML: 5 INJECTION INTRAVENOUS at 09:56

## 2021-08-30 RX ADMIN — REGADENOSON 0.4 MG: 0.08 INJECTION, SOLUTION INTRAVENOUS at 09:55

## 2021-08-30 RX ADMIN — Medication 31.7 MILLICURIE: at 09:55

## 2021-08-30 NOTE — TELEPHONE ENCOUNTER
----- Message from Daria Sullivan MD sent at 8/30/2021  4:17 PM EDT -----  Please let patient know that stress test was normal and preop forms were completed.

## 2021-08-30 NOTE — PROCEDURES
[id number] [facility] [address] [phone]                Pharmacologic Stress Nuclear Gated SPECT Study    Name: [address] Account Number: [de-identified]    :  1950          Sex: male         Date of Study:  2021    Height: 5' 4\" (162.6 cm)         Weight: 205 lb (93 kg)     Ordering Provider: Angelica Sprague MD          PCP: Gilmer Aleman MD      Cardiologist: Angelica Sprague MD             Interpreting Physician: Jen Taylor DO  _________________________________________________________________________________    Indication:   Preoperative Risk Stratification    Clinical History:   Patient has prior history of coronary artery disease. Resting ECG:    Sinus bradycardia. Procedure:   Pharmacologic stress testing was performed with regadenoson 0.4 mg for 15 seconds. Additionally, low-level exercise was performed along with the infusion. The heart rate was 53 at baseline and yvan to 82 beats during the infusion. This corresponds to 54% of maximum predicted heart rate. The blood pressure at baseline was 128/70 and blood pressure at the end of infusion was 140/70. Blood pressure response was normal during the stress procedure. The patient tolerated the infusion well. ECG during the infusion did not change. IMAGING: Myocardial perfusion imaging was performed at rest 30-35 minutes following the intravenous injection of 10.2 mCi of (Tc-Sestamibi) followed by 10 ml of Normal Saline. As per infusion protocol, the patient was injected intravenously with 31.7 mCi of (Tc-Sestamibi) followed by 10 ml of Normal Saline. Gated post-stress tomographic imaging was performed 20-25 minutes after stress. FINDINGS: The overall quality of the study was good. Left ventricular cavity size was noted to be normal.    Rotational analog analysis demonstrated soft tissue diaphragmatic attenuation.     The gated SPECT stress imaging in the short, vertical long, and horizontal long axis demonstrated normal homogeneous tracer distribution throughout the myocardium. The resting images are normal.     Gated SPECT left ventricular ejection fraction was calculated to be 71%, with normal myocardial thickening and wall motion. Impression:    1. ECG during the infusion did not change. 2. The myocardial perfusion imaging was normal.    3. Overall left ventricular systolic function was normal without regional wall motion abnormalities. 4. Low risk general pharmacologic stress test.    Thank you for sending your patient to this [employer].      Electronically signed by Minoo Agosto DO on 8/30/21 at 11:57 AM EDT

## 2021-11-09 DIAGNOSIS — M1A.9XX0 CHRONIC GOUT WITHOUT TOPHUS, UNSPECIFIED CAUSE, UNSPECIFIED SITE: ICD-10-CM

## 2021-11-09 DIAGNOSIS — E11.65 TYPE 2 DIABETES MELLITUS WITH HYPERGLYCEMIA, WITHOUT LONG-TERM CURRENT USE OF INSULIN (HCC): ICD-10-CM

## 2021-11-09 DIAGNOSIS — D75.1 POLYCYTHEMIA: ICD-10-CM

## 2021-11-09 DIAGNOSIS — E55.9 VITAMIN D DEFICIENCY: ICD-10-CM

## 2021-11-09 DIAGNOSIS — Z12.11 COLON CANCER SCREENING: ICD-10-CM

## 2021-11-09 DIAGNOSIS — Z72.0 TOBACCO USE: ICD-10-CM

## 2021-11-09 DIAGNOSIS — I10 ESSENTIAL HYPERTENSION: ICD-10-CM

## 2021-11-09 DIAGNOSIS — E78.2 MIXED HYPERLIPIDEMIA: ICD-10-CM

## 2021-11-09 LAB
ALBUMIN SERPL-MCNC: 4.2 G/DL (ref 3.5–5.2)
ALP BLD-CCNC: 48 U/L (ref 40–129)
ALT SERPL-CCNC: 35 U/L (ref 0–40)
ANION GAP SERPL CALCULATED.3IONS-SCNC: 12 MMOL/L (ref 7–16)
AST SERPL-CCNC: 30 U/L (ref 0–39)
BACTERIA: NORMAL /HPF
BASOPHILS ABSOLUTE: 0.04 E9/L (ref 0–0.2)
BASOPHILS RELATIVE PERCENT: 0.6 % (ref 0–2)
BILIRUB SERPL-MCNC: 0.6 MG/DL (ref 0–1.2)
BILIRUBIN URINE: NEGATIVE
BLOOD, URINE: NORMAL
BUN BLDV-MCNC: 15 MG/DL (ref 6–23)
CALCIUM SERPL-MCNC: 9.6 MG/DL (ref 8.6–10.2)
CHLORIDE BLD-SCNC: 102 MMOL/L (ref 98–107)
CHOLESTEROL, TOTAL: 139 MG/DL (ref 0–199)
CLARITY: CLEAR
CO2: 23 MMOL/L (ref 22–29)
COLOR: YELLOW
CREAT SERPL-MCNC: 1.1 MG/DL (ref 0.7–1.2)
CREATININE URINE: 119 MG/DL (ref 40–278)
EOSINOPHILS ABSOLUTE: 0.31 E9/L (ref 0.05–0.5)
EOSINOPHILS RELATIVE PERCENT: 4.9 % (ref 0–6)
GFR AFRICAN AMERICAN: >60
GFR NON-AFRICAN AMERICAN: >60 ML/MIN/1.73
GLUCOSE BLD-MCNC: 106 MG/DL (ref 74–99)
GLUCOSE URINE: NEGATIVE MG/DL
HBA1C MFR BLD: 5.4 % (ref 4–5.6)
HCT VFR BLD CALC: 46.3 % (ref 37–54)
HDLC SERPL-MCNC: 38 MG/DL
HEMOGLOBIN: 15.6 G/DL (ref 12.5–16.5)
IMMATURE GRANULOCYTES #: 0.02 E9/L
IMMATURE GRANULOCYTES %: 0.3 % (ref 0–5)
KETONES, URINE: NEGATIVE MG/DL
LDL CHOLESTEROL CALCULATED: 61 MG/DL (ref 0–99)
LEUKOCYTE ESTERASE, URINE: NEGATIVE
LYMPHOCYTES ABSOLUTE: 1.92 E9/L (ref 1.5–4)
LYMPHOCYTES RELATIVE PERCENT: 30.1 % (ref 20–42)
MCH RBC QN AUTO: 29.8 PG (ref 26–35)
MCHC RBC AUTO-ENTMCNC: 33.7 % (ref 32–34.5)
MCV RBC AUTO: 88.5 FL (ref 80–99.9)
MICROALBUMIN UR-MCNC: 26.7 MG/L
MICROALBUMIN/CREAT UR-RTO: 22.4 (ref 0–30)
MONOCYTES ABSOLUTE: 0.53 E9/L (ref 0.1–0.95)
MONOCYTES RELATIVE PERCENT: 8.3 % (ref 2–12)
NEUTROPHILS ABSOLUTE: 3.55 E9/L (ref 1.8–7.3)
NEUTROPHILS RELATIVE PERCENT: 55.8 % (ref 43–80)
NITRITE, URINE: NEGATIVE
PDW BLD-RTO: 13.3 FL (ref 11.5–15)
PH UA: 5.5 (ref 5–9)
PLATELET # BLD: 146 E9/L (ref 130–450)
PMV BLD AUTO: 9.8 FL (ref 7–12)
POTASSIUM SERPL-SCNC: 4.8 MMOL/L (ref 3.5–5)
PROTEIN UA: NEGATIVE MG/DL
RBC # BLD: 5.23 E12/L (ref 3.8–5.8)
RBC UA: NORMAL /HPF (ref 0–2)
SODIUM BLD-SCNC: 137 MMOL/L (ref 132–146)
SPECIFIC GRAVITY UA: 1.02 (ref 1–1.03)
TOTAL CK: 112 U/L (ref 20–200)
TOTAL PROTEIN: 7.2 G/DL (ref 6.4–8.3)
TRIGL SERPL-MCNC: 198 MG/DL (ref 0–149)
TSH SERPL DL<=0.05 MIU/L-ACNC: 1.61 UIU/ML (ref 0.27–4.2)
URIC ACID, SERUM: 8.4 MG/DL (ref 3.4–7)
UROBILINOGEN, URINE: 0.2 E.U./DL
VITAMIN D 25-HYDROXY: 37 NG/ML (ref 30–100)
VLDLC SERPL CALC-MCNC: 40 MG/DL
WBC # BLD: 6.4 E9/L (ref 4.5–11.5)
WBC UA: NORMAL /HPF (ref 0–5)

## 2021-11-12 ENCOUNTER — OFFICE VISIT (OUTPATIENT)
Dept: PRIMARY CARE CLINIC | Age: 71
End: 2021-11-12
Payer: MEDICARE

## 2021-11-12 VITALS
HEART RATE: 62 BPM | WEIGHT: 209 LBS | BODY MASS INDEX: 35.87 KG/M2 | DIASTOLIC BLOOD PRESSURE: 72 MMHG | TEMPERATURE: 97.8 F | SYSTOLIC BLOOD PRESSURE: 130 MMHG | OXYGEN SATURATION: 97 %

## 2021-11-12 DIAGNOSIS — I25.10 CORONARY ARTERY DISEASE INVOLVING NATIVE CORONARY ARTERY OF NATIVE HEART WITHOUT ANGINA PECTORIS: ICD-10-CM

## 2021-11-12 DIAGNOSIS — E66.01 SEVERE OBESITY (BMI 35.0-35.9 WITH COMORBIDITY) (HCC): ICD-10-CM

## 2021-11-12 DIAGNOSIS — I10 ESSENTIAL HYPERTENSION: Primary | ICD-10-CM

## 2021-11-12 DIAGNOSIS — Z72.0 TOBACCO USE: ICD-10-CM

## 2021-11-12 DIAGNOSIS — K21.9 GASTROESOPHAGEAL REFLUX DISEASE WITHOUT ESOPHAGITIS: ICD-10-CM

## 2021-11-12 DIAGNOSIS — M1A.9XX0 CHRONIC GOUT WITHOUT TOPHUS, UNSPECIFIED CAUSE, UNSPECIFIED SITE: ICD-10-CM

## 2021-11-12 DIAGNOSIS — Z87.891 PERSONAL HISTORY OF TOBACCO USE: ICD-10-CM

## 2021-11-12 DIAGNOSIS — E11.65 TYPE 2 DIABETES MELLITUS WITH HYPERGLYCEMIA, WITHOUT LONG-TERM CURRENT USE OF INSULIN (HCC): ICD-10-CM

## 2021-11-12 DIAGNOSIS — H25.9 SENILE CATARACT OF LEFT EYE, UNSPECIFIED AGE-RELATED CATARACT TYPE: ICD-10-CM

## 2021-11-12 DIAGNOSIS — J44.9 CHRONIC OBSTRUCTIVE PULMONARY DISEASE, UNSPECIFIED COPD TYPE (HCC): ICD-10-CM

## 2021-11-12 DIAGNOSIS — F32.A ANXIETY AND DEPRESSION: ICD-10-CM

## 2021-11-12 DIAGNOSIS — E78.2 MIXED HYPERLIPIDEMIA: ICD-10-CM

## 2021-11-12 DIAGNOSIS — E55.9 VITAMIN D DEFICIENCY: ICD-10-CM

## 2021-11-12 DIAGNOSIS — F41.9 ANXIETY AND DEPRESSION: ICD-10-CM

## 2021-11-12 PROCEDURE — 99215 OFFICE O/P EST HI 40 MIN: CPT | Performed by: FAMILY MEDICINE

## 2021-11-12 PROCEDURE — G8417 CALC BMI ABV UP PARAM F/U: HCPCS | Performed by: FAMILY MEDICINE

## 2021-11-12 PROCEDURE — 3023F SPIROM DOC REV: CPT | Performed by: FAMILY MEDICINE

## 2021-11-12 PROCEDURE — 1036F TOBACCO NON-USER: CPT | Performed by: FAMILY MEDICINE

## 2021-11-12 PROCEDURE — G8926 SPIRO NO PERF OR DOC: HCPCS | Performed by: FAMILY MEDICINE

## 2021-11-12 PROCEDURE — 3044F HG A1C LEVEL LT 7.0%: CPT | Performed by: FAMILY MEDICINE

## 2021-11-12 PROCEDURE — 2022F DILAT RTA XM EVC RTNOPTHY: CPT | Performed by: FAMILY MEDICINE

## 2021-11-12 PROCEDURE — G8427 DOCREV CUR MEDS BY ELIG CLIN: HCPCS | Performed by: FAMILY MEDICINE

## 2021-11-12 PROCEDURE — G8484 FLU IMMUNIZE NO ADMIN: HCPCS | Performed by: FAMILY MEDICINE

## 2021-11-12 PROCEDURE — 4040F PNEUMOC VAC/ADMIN/RCVD: CPT | Performed by: FAMILY MEDICINE

## 2021-11-12 PROCEDURE — 3017F COLORECTAL CA SCREEN DOC REV: CPT | Performed by: FAMILY MEDICINE

## 2021-11-12 PROCEDURE — 1123F ACP DISCUSS/DSCN MKR DOCD: CPT | Performed by: FAMILY MEDICINE

## 2021-11-12 RX ORDER — ALLOPURINOL 300 MG/1
300 TABLET ORAL DAILY
Qty: 90 TABLET | Refills: 3 | Status: SHIPPED | OUTPATIENT
Start: 2021-11-12

## 2021-11-12 NOTE — PROGRESS NOTES
19  Ladoris Schirmer : 1950 Sex: male  Age: 73 y.o. Chief Complaint   Patient presents with    Discuss Labs       HPI  HPI:      Patient presents today with his wife for follow-up. Overall feels well. Trying to minimize contact with Covid. Had 2 vaccines planning third. He has not been taking his allopurinol. He has no complaints or concerns at this time. Since seeing me last he saw the cardiologist and had a negative stress test.  He had LDCTThat showed no suspicious lesions, chronic findings include cholelithiasis, mild splenomegaly. He is asymptomatic. Counseled. Defers other E/T now. It is noted that the spleen was seen previously, not completely visualized on this study. Blood work reviewed, CMP shows glucose 106 uric acid 8.4 HDL 38 LDL 61 triglyceride 198 hemoglobin A1c only 5.4 vitamin D 37 TSH 1.6 CBC with differential normal urinalysis trace blood but none seen microscopically, microalbumin to creatinine ratio 22.4. States he has seen Dr. Blanche Cuellar in the past for the hematuria and told to follow-up if symptoms or worsens. Defers E/T now. Phonic knee pain, following with Ortho   Unfortunately still smoking. He will think about smoking, not ready yet. Counseled extensively on smoking cessation. Declines referral to pulmonology, PFTs. Defers AAA screen. Sees work physician for h/o asbestos exposure. They do cxr and pfts      Follows with eye doctor regarding cataract              HM  Awaiting fit test and Cologuard, defers colonoscopy. Again counseled on Shingrix.           Most Recent Labs  CBC  Lab Results   Component Value Date    WBC 6.4 2021    WBC 4.7 2021    WBC 5.8 2020    RBC 5.23 2021    RBC 5.23 2021    RBC 5.29 2020    HGB 15.6 2021    HGB 15.7 2021    HGB 15.9 2020    HCT 46.3 2021    HCT 47.0 2021    HCT 47.3 2020    MCV 88.5 2021    MCV 89.9 2021    MCV 89.4 2020 11/09/2021    LDLCALC 57 07/02/2021    LDLCALC 54 07/20/2020    TRIG 198 11/09/2021    TRIG 171 07/02/2021    TRIG 177 07/20/2020     VITAMIN D  Lab Results   Component Value Date    VITD25 37 11/09/2021    VITD25 37 07/02/2021    VITD25 36 07/20/2020     MAGNESIUM  Lab Results   Component Value Date    MG 2.6 06/20/2011    MG 2.3 06/11/2011    MG 2.3 06/10/2011      PHOS  Lab Results   Component Value Date    PHOS 2.5 06/11/2011    PHOS 4.1 06/10/2011    PHOS 4.2 06/09/2011      MARIA GUADALUPE   Lab Results   Component Value Date    MARIA GUADALUPE NEGATIVE 08/30/2015    MARIA GUADALUPE NEGATIVE 08/18/2011    MARIA GUADALUPE NEGATIVE 06/06/2011     RHEUMATOID FACTOR  Lab Results   Component Value Date    RF <10 08/30/2015    RF <4 08/18/2011    RF <4 06/06/2011     PSA  Lab Results   Component Value Date    PSA 1.81 07/02/2021    PSA 3.53 07/20/2020    PSA 2.23 04/25/2019      HEPATITIS C  Lab Results   Component Value Date    HCVABI Non-Reactive 06/11/2018     HIV  No results found for: IJT6LLC, HIV1QT  UA  Lab Results   Component Value Date    COLORU Yellow 11/09/2021    COLORU Yellow 07/02/2021    COLORU Yellow 07/20/2020    CLARITYU Clear 11/09/2021    CLARITYU Clear 07/02/2021    CLARITYU Clear 07/20/2020    GLUCOSEU Negative 11/09/2021    GLUCOSEU Negative 07/02/2021    GLUCOSEU Negative 07/20/2020    GLUCOSEU NEGATIVE 08/18/2011    GLUCOSEU NEGATIVE 06/08/2011    GLUCOSEU NEGATIVE 04/25/2011    BILIRUBINUR Negative 11/09/2021    BILIRUBINUR Negative 07/02/2021    BILIRUBINUR Negative 07/20/2020    BILIRUBINUR NEGATIVE 08/18/2011    BILIRUBINUR NEGATIVE 06/08/2011    BILIRUBINUR NEGATIVE 04/25/2011    KETUA Negative 11/09/2021    KETUA Negative 07/02/2021    KETUA Negative 07/20/2020    SPECGRAV 1.025 11/09/2021    SPECGRAV >=1.030 07/02/2021    SPECGRAV 1.025 07/20/2020    BLOODU TRACE-LYSED 11/09/2021    BLOODU Negative 07/02/2021    BLOODU TRACE-INTACT 07/20/2020    PHUR 5.5 11/09/2021    PHUR 5.5 07/02/2021    PHUR 5.5 07/20/2020    PROTEINU Negative 11/09/2021    PROTEINU Negative 07/02/2021    PROTEINU Negative 07/20/2020    UROBILINOGEN 0.2 11/09/2021    UROBILINOGEN 0.2 07/02/2021    UROBILINOGEN 0.2 07/20/2020    NITRU Negative 11/09/2021    NITRU Negative 07/02/2021    NITRU Negative 07/20/2020    LEUKOCYTESUR Negative 11/09/2021    LEUKOCYTESUR Negative 07/02/2021    LEUKOCYTESUR Negative 07/20/2020     Urine Micro/Albumin Ratio  Lab Results   Component Value Date    MALBCR 22.4 11/09/2021    MALBCR 8.1 07/02/2021    MALBCR 10.7 07/20/2020       Review of Systems  ROS:  Const: Denies chills, fever, malaise and sweats. Eyes: Denies discharge, pain, redness and visual disturbance   ENMT: Denies earaches, other ear symptoms. Denies nasal or sinus symptoms other than stated  above. Denies mouth and tongue lesions and sore throat. CV: Denies chest discomfort, pain; diaphoresis, dizziness, edema, lightheadedness, orthopnea,  palpitations, syncope and near syncopal episode or any exertional symptoms  Resp: Denies cough, hemoptysis, pleuritic pain, SOB, sputum production and wheezing. GI: Denies abdominal pain, change in bowel habits, hematochezia, melena, nausea and vomiting. : Denies urinary symptoms including dysuria , urgency, frequency or hematuria. Musculo: Chronic low back pain with limited range of motion, chronic knee pain. OA. Skin: Denies bruising and rash.   Neuro: Denies headache, numbness, stiff neck, tingling and focal weakness slurred speech or facial  droop  Hema/Lymph: Denies bleeding/bruising tendency and enlarged lymph nodes        Current Outpatient Medications:     allopurinol (ZYLOPRIM) 300 MG tablet, Take 1 tablet by mouth daily, Disp: 90 tablet, Rfl: 3    Multiple Vitamin (MULTIVITAMIN PO), Take by mouth daily, Disp: , Rfl:     colchicine (COLCRYS) 0.6 MG tablet, Two tablets by mouth x 1 at first sign of gout, then one tablet one hour later, Disp: 30 tablet, Rfl: 0    FLUoxetine (PROZAC) 20 MG capsule, Take 1 capsule by mouth daily, Disp: 90 capsule, Rfl: 3    metoprolol succinate (TOPROL XL) 25 MG extended release tablet, Take 0.5 tablets by mouth daily, Disp: 45 tablet, Rfl: 3    rosuvastatin (CRESTOR) 10 MG tablet, Take 1 tablet by mouth daily, Disp: 90 tablet, Rfl: 3    tiotropium (SPIRIVA) 18 MCG inhalation capsule, Inhale 1 capsule into the lungs daily, Disp: 90 capsule, Rfl: 3    albuterol sulfate  (90 Base) MCG/ACT inhaler, 1-2 puffs q4-6hrs prn, Disp: 3 Inhaler, Rfl: 6    vitamin D (ERGOCALCIFEROL) 1.25 MG (18981 UT) CAPS capsule, Take 1 capsule by mouth once a week, Disp: 12 capsule, Rfl: 3    aspirin 81 MG EC tablet, Take 81 mg by mouth daily. , Disp: , Rfl:   No Known Allergies    Past Medical History:   Diagnosis Date    CAD (coronary artery disease)     [facility] pulmonary disease) [facility] [address]     Hyperlipidemia     Hypertension      Past Surgical History:   Procedure Laterality Date    CORONARY ARTERY BYPASS GRAFT  11    ACB X 5- DR. ROBLEDO    DIAGNOSTIC CARDIAC CATH LAB PROCEDURE  11    DR. VÁSQUEZ    TONSILLECTOMY AND ADENOIDECTOMY       Family History   Problem Relation Age of Onset    Thyroid Disease Mother     Heart Attack Father 61    Hypertension Father     Hypertension Sister     Heart Surgery Sister     Cirrhosis Brother     Hypertension Sister     Heart Surgery Sister     Heart Surgery Sister     Hypertension Sister      Social History     Tobacco Use    Smoking status: Former Smoker     Packs/day: 1.00     Years: 45.00     Pack years: 45.00     Types: Cigarettes     Start date: 1970     Quit date: 2021     Years since quittin.2    Smokeless tobacco: Never Used   Vaping Use    Vaping Use: Never used   Substance Use Topics    Alcohol use: Yes     Comment: occassionally on holidays    Drug use: No      Social History     Social History Narrative        PMH:    Problem List: 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 FH:    Father:     age 61 - MI - hypertension. Mother:     age 66 - post-op complications - thyroid, reflux - post op after hip fx (septic). Brother -  52's cirrhosis from alcoholism    Brother (twin identical) htn (he follows with physicians regularly)    Sisters x 3 - all with HTN and 1 with CABG age 71 (stent 76)    Reviewed, no changes. SH:    Marital: Legal Status: . Previously Worked as , asbestos exposure, follows with work physicians    Personal Habits: Cigarette Use: Former Cigarette Smoker - quit ., now smoking again as of . Alcohol: Denies alcohol use. FH:  Father:   age 61 - MI - hypertension. Mother:   age 66 - post-op complications - thyroid, reflux - post op after hip fx (septic). Brother -  52's cirrhosis from alcoholism  Brother (twin identical) htn (he follows with physicians regularly)  Sisters x 3 - all with HTN and 1 with CABG age 71 (stent 76)                  Vitals:    21 1305   BP: 130/72   Pulse: 62   Temp: 97.8 °F (36.6 °C)   SpO2: 97%   Weight: 209 lb (94.8 kg)      Wt Readings from Last 3 Encounters:   21 209 lb (94.8 kg)   21 205 lb (93 kg)   21 205 lb (93 kg)        Physical Exam  Exam:  Const: Appears comfortable. No signs of acute distress present. Head/Face: Atraumatic on inspection. Eyes: EOMI in both eyes. No discharge from the eyes. PERRL. Sclerae clear. ENMT: Auditory canals normal. Tympanic membranes: intact and translucent. External nose WNL. Nasal mucosa is clear. Oropharynx: No erythema or exudate. Posterior pharynx is normal.  Neck: Supple. Palpation reveals no adenopathy. No masses appreciated. No JVD. Carotids: no  bruits. Resp: Respirations are unlabored. Clear to auscultation. No rales, rhonchi or wheezes appreciated  over the lungs bilaterally. CV: Rate is regular. Rhythm is regular. No gallop or rubs. No heart murmur appreciated. Extremities: No clubbing, cyanosis, or edema. No calf inflammation or tenderness. Abdomen: Bowel sounds are normoactive. Abdomen is soft, nontender, and nondistended. No  abdominal masses. No palpable hepatosplenomegaly. Lymph: No palpable or visible regional lymphadenopathy. Musculoskeletal: no acute joint inflammation except chronic low back pain with limited range of motion. . Crepitus bilateral knees  Skin: Dry and warm with no rash. Skin normal to inspection and palpation overall. Neuro: Alert and oriented. Affect: appropriate. Upper Extremities: 5/5 bilaterally. Lower Extremities:  5/5 bilaterally. Sensation intact to light touch. Reflexes: DTR's are symmetric and 2+ bilaterally. .  Cranial Nerves: Cranial nerves grossly intact. Assessment and Plan:   Diagnosis Orders   1. Essential hypertension  CBC Auto Differential   2. Mixed hyperlipidemia  Lipid Panel    Comprehensive Metabolic Panel    CBC Auto Differential    CK   3. Type 2 diabetes mellitus with hyperglycemia, without long-term current use of insulin (HCC)  TSH without Reflex    CBC Auto Differential    Hemoglobin A1C    Urinalysis    Microalbumin / Creatinine Urine Ratio   4. Vitamin D deficiency  Vitamin D 25 Hydroxy   5. Tobacco use     6. Anxiety and depression  TSH without Reflex   7. Gastroesophageal reflux disease without esophagitis     8. Chronic obstructive pulmonary disease, unspecified COPD type ([facility] [address]     9. Coronary artery disease involving native coronary artery of native heart without angina pectoris     10.  Chronic gout without tophus, unspecified cause, unspecified site  TSH without Reflex    Uric Acid    allopurinol (ZYLOPRIM) 300 MG tablet   11. Severe obesity (BMI 35.0-35.9 with comorbidity) ([address]     12. Personal history of tobacco use     13. Senile cataract of left eye, unspecified age-related cataract type         No problem-specific Assessment & Plan notes found for this encounter. Age-related cataract of left eye  Continue per eye care     Hypertension  Care Plan:  Comments :  History of whitecoat hypertension,excellent at home. Tolerating therapy. Risk of HTN reviewed. lifestyle modification emphasized. hyper and hypotensive precautions reviewed pulse parameters also  reviewed. Declines ACE inhibitor or ARB. Other hyperlipidemia  Care Plan:  Comments : Tolerating Crestor. Dose-dependent benefits reviewed goals reviewed. Defers change in therapy. Tolerating well. .SE's/Instructions/Risks/Benefits reviewed. If  ADR's, D/C and notify. The risks  and benefits of Vitamin D3 and Coenzyme Q-10 supplementation reviewed. risk of hyperlipidemia reviewed lifestyle modification reviewed. Goals reviewed. Declines change in therapy. Previously on Lipitor but unaffordable.     Liver disease, unspecified  Care Plan:  Comments : Counseled extensively. Differential reviewed, including serious etiologies. Likely fatty liver. Precautions reviewed. Lifestyle modification appropriate diet  and weight loss reviewed. Risks of even this reviewed. Other than basic monitoring on interested in other evaluation or treatment,  in-depth blood work, imaging or otherwise. Hepatitis C screen negative. Defers  further evaluation or treatment otherwise. LFTs now normal     Hematuria, unspecified  Care Plan:  Comments : Counseled extensively. Differential reviewed, including serious etiologies. Asymptomatic. Continue per Dr. Concepcion Craig . higher risk given  tobacco use reviewed. Although I have encouraged him to follow-up with Dr. Concepcion Craig but, he is not interested now.   There is trace blood on urinalysis but none microscopically. His wife states several family hours also have this condition       Encounter for general adult medical examination with bnormal  findings  Care Plan:  Comments : Health maintenance issues discussed at length 8/11/2021 encouraged yearly  Physicals. Atherosclerotic heart disease of native coronary artery without angina  pectoris  Care Plan:  Comments : Status post CABG. asx. encouraged fu w/ cardiologist, previously dr Laura Negron,  declines. currently off ACE inhibitor/ARB and declines at this time. Continue per cardiology. Stress test 8/21 was low risk      Type 2 diabetes mellitus with hyperglycemia  Care Plan:  Comments :  extensively. watch BS closely ambulatory. Parameters given. Call if not  in range. ER if dangerous numbers. Macro and microvascular complications  reviewed. Importance of at least yearly eye exams and daily foot exams  reviewed. Risks and benefits of insulin sensitizers reviewed and he declines  now. Fasting glucose remains elevated but A1c excellent at 5.7-5.5-5.6 -5.4        Gout, unspecified  Care Plan:  Comments : Emphasized low uric acid diet. Compliance on allopurinol has not been taking. Emphasized. Proper hydration. He has colcrys prn,2×1 at onset of  symptoms and repeat one hour later. .  reviewed. Uric acid goals reviewed. Resume allopurinol/compliance emphasized. Refilled. Risk of hyperuricemia reviewed        Chronic obstructive pulmonary disease, unspecified  Care Plan:  Comments : Asymptomatic. Of the CT 7/21 - for nodules. Defers pulmonary Function Tests, referral or otherwise. Currently on  Spiriva and p.r.n. Proair which he rarely needs although a little more in the fall.     Intervertebral disc disorders with myelopathy, lumbar region  Care Plan:   Status post injections through [facility], then surgery with  discectomy/laminectomy through Dr. Alfonso Oliveira.  Continue per them. h/o PT     Vitamin D deficiency, unspecified  Care Plan:  Comments : Stable on prescription vitamin D,, continue monitoring     Disorder of prostate, unspecified  Care Plan:  Comments : Counseled extensively. Differential reviewed, including serious etiologies. rising  PSA is trending down from 2.6-2.2-3.53-1. 81. He should follow-up with Dr. Concepcion Craig, deferring now       Secondary polycythemia  Care Plan:  Comments : Likely related to smoking, other differential reviewed. Monitor. Declines further  eval/tx. currently normal    Dysthymic disorder  Care Plan:  Comments : Stable. Continue current therapy. r/b meds reviewed.     Gastro-esophageal reflux disease without esophagitis  Care Plan:  Comments : Asymptomatic. Was on Pepcid but not even taking this now. . Declines further testing and  understands risk of masking underlying etiologies. Risks of meds also reviewed  including calcium malabsorption .     CRI-  Counseled. Proper hydration. Avoid nephrotoxic agents monitor. Declines imaging or referral creatinine stable, currently normal         Tobacco abuse-counseled extensively on importance of abstinence. States he gets chest x-ray and PFTs at work but, defers REF. LDCT 7/trauma. Declines abstinence or assistance.      Bilateral knee OA  Failed conservative measures. Continue per [facility]  Topicals reviewed. R/B Tylenol reviewed. Avoidance of NSAID recommendations reviewed  Obesity  Counseled. Risk reviewed. Lifestyle modification emphasized. Counseled on Mediterranean diet/weight watchers. Declines formal invention. No problem-specific Assessment & Plan notes found for this encounter. Plan as above. Counseled extensively. Continue per specialists, continue current management, prescription med management, lifestyle modification, refills, otherwise blood work and follow-up in 3 months sooner as needed       No flowsheet data found.           Counseled extensively and differential diagnoses relevant to above were reviewed, including serious etiologies. Side effects and interactions of medications were reviewed. As long as symptoms steadily improve/resolve, and medical conditions follow the expected course, FU as below, sooner PRN. Return in about 3 months (around 2/12/2022), or if symptoms worsen or fail to improve. Signs and symptoms to watch for discussed, serious signs and symptoms reviewed. ER if any. Over 40 minutes  spent with the patient in reviewing records, reviewing with patient/family, counseling, ordering,  prescribing, completing h&p, etc., with over 50% of the time spent face to face counseling. Jenny Perez MD    Patients are advised to check with insurance company to ensure coverage and to fully understand benefits and cost prior to any testing. This note was created with the assistance of voice recognition software. Document was reviewed however may contain grammatical errors.

## 2022-03-17 DIAGNOSIS — E78.2 MIXED HYPERLIPIDEMIA: ICD-10-CM

## 2022-03-17 DIAGNOSIS — F32.A ANXIETY AND DEPRESSION: ICD-10-CM

## 2022-03-17 DIAGNOSIS — E11.65 TYPE 2 DIABETES MELLITUS WITH HYPERGLYCEMIA, WITHOUT LONG-TERM CURRENT USE OF INSULIN (HCC): ICD-10-CM

## 2022-03-17 DIAGNOSIS — F41.9 ANXIETY AND DEPRESSION: ICD-10-CM

## 2022-03-17 DIAGNOSIS — I10 ESSENTIAL HYPERTENSION: ICD-10-CM

## 2022-03-17 DIAGNOSIS — E55.9 VITAMIN D DEFICIENCY: ICD-10-CM

## 2022-03-17 DIAGNOSIS — M1A.9XX0 CHRONIC GOUT WITHOUT TOPHUS, UNSPECIFIED CAUSE, UNSPECIFIED SITE: ICD-10-CM

## 2022-03-17 LAB
ALBUMIN SERPL-MCNC: 4.3 G/DL (ref 3.5–5.2)
ALP BLD-CCNC: 51 U/L (ref 40–129)
ALT SERPL-CCNC: 26 U/L (ref 0–40)
ANION GAP SERPL CALCULATED.3IONS-SCNC: 11 MMOL/L (ref 7–16)
AST SERPL-CCNC: 29 U/L (ref 0–39)
BASOPHILS ABSOLUTE: 0.06 E9/L (ref 0–0.2)
BASOPHILS RELATIVE PERCENT: 1 % (ref 0–2)
BILIRUB SERPL-MCNC: 0.6 MG/DL (ref 0–1.2)
BILIRUBIN URINE: NEGATIVE
BLOOD, URINE: NEGATIVE
BUN BLDV-MCNC: 15 MG/DL (ref 6–23)
CALCIUM SERPL-MCNC: 9.8 MG/DL (ref 8.6–10.2)
CHLORIDE BLD-SCNC: 106 MMOL/L (ref 98–107)
CHOLESTEROL, TOTAL: 123 MG/DL (ref 0–199)
CLARITY: CLEAR
CO2: 26 MMOL/L (ref 22–29)
COLOR: YELLOW
CREAT SERPL-MCNC: 1.2 MG/DL (ref 0.7–1.2)
CREATININE URINE: 155 MG/DL (ref 40–278)
EOSINOPHILS ABSOLUTE: 0.34 E9/L (ref 0.05–0.5)
EOSINOPHILS RELATIVE PERCENT: 5.6 % (ref 0–6)
GFR AFRICAN AMERICAN: >60
GFR NON-AFRICAN AMERICAN: 60 ML/MIN/1.73
GLUCOSE BLD-MCNC: 105 MG/DL (ref 74–99)
GLUCOSE URINE: NEGATIVE MG/DL
HBA1C MFR BLD: 5.7 % (ref 4–5.6)
HCT VFR BLD CALC: 47.7 % (ref 37–54)
HDLC SERPL-MCNC: 30 MG/DL
HEMOGLOBIN: 15.3 G/DL (ref 12.5–16.5)
IMMATURE GRANULOCYTES #: 0.01 E9/L
IMMATURE GRANULOCYTES %: 0.2 % (ref 0–5)
KETONES, URINE: NEGATIVE MG/DL
LDL CHOLESTEROL CALCULATED: 66 MG/DL (ref 0–99)
LEUKOCYTE ESTERASE, URINE: NEGATIVE
LYMPHOCYTES ABSOLUTE: 2.08 E9/L (ref 1.5–4)
LYMPHOCYTES RELATIVE PERCENT: 34 % (ref 20–42)
MCH RBC QN AUTO: 29.1 PG (ref 26–35)
MCHC RBC AUTO-ENTMCNC: 32.1 % (ref 32–34.5)
MCV RBC AUTO: 90.9 FL (ref 80–99.9)
MICROALBUMIN UR-MCNC: 14.9 MG/L
MICROALBUMIN/CREAT UR-RTO: 9.6 (ref 0–30)
MONOCYTES ABSOLUTE: 0.5 E9/L (ref 0.1–0.95)
MONOCYTES RELATIVE PERCENT: 8.2 % (ref 2–12)
NEUTROPHILS ABSOLUTE: 3.13 E9/L (ref 1.8–7.3)
NEUTROPHILS RELATIVE PERCENT: 51 % (ref 43–80)
NITRITE, URINE: NEGATIVE
PDW BLD-RTO: 14.2 FL (ref 11.5–15)
PH UA: 5 (ref 5–9)
PLATELET # BLD: 159 E9/L (ref 130–450)
PMV BLD AUTO: 9.2 FL (ref 7–12)
POTASSIUM SERPL-SCNC: 5.2 MMOL/L (ref 3.5–5)
PROTEIN UA: NEGATIVE MG/DL
RBC # BLD: 5.25 E12/L (ref 3.8–5.8)
SODIUM BLD-SCNC: 143 MMOL/L (ref 132–146)
SPECIFIC GRAVITY UA: 1.02 (ref 1–1.03)
TOTAL CK: 122 U/L (ref 20–200)
TOTAL PROTEIN: 7.2 G/DL (ref 6.4–8.3)
TRIGL SERPL-MCNC: 136 MG/DL (ref 0–149)
TSH SERPL DL<=0.05 MIU/L-ACNC: 1.44 UIU/ML (ref 0.27–4.2)
URIC ACID, SERUM: 9.4 MG/DL (ref 3.4–7)
UROBILINOGEN, URINE: 0.2 E.U./DL
VITAMIN D 25-HYDROXY: 36 NG/ML (ref 30–100)
VLDLC SERPL CALC-MCNC: 27 MG/DL
WBC # BLD: 6.1 E9/L (ref 4.5–11.5)

## 2022-03-18 DIAGNOSIS — E87.5 HYPERKALEMIA: Primary | ICD-10-CM

## 2022-03-18 NOTE — RESULT ENCOUNTER NOTE
Blood work reviewed, potassium slightly elevated. Encourage proper hydration, minimal potassium intake and avoid potassium supplements. Frequently a false positive at this lab but if truly rising can become problematic. Ideally repeat if willing. Uric acid also quite elevated. Make sure taking his allopurinol 300 mg daily. Emphasize importance of low uric acid diet and proper hydration.   Keep follow-up to review more detail sooner as needed

## 2022-03-24 ENCOUNTER — OFFICE VISIT (OUTPATIENT)
Dept: PRIMARY CARE CLINIC | Age: 72
End: 2022-03-24
Payer: MEDICARE

## 2022-03-24 VITALS
BODY MASS INDEX: 35.36 KG/M2 | DIASTOLIC BLOOD PRESSURE: 72 MMHG | TEMPERATURE: 97.6 F | WEIGHT: 206 LBS | HEART RATE: 65 BPM | OXYGEN SATURATION: 96 % | SYSTOLIC BLOOD PRESSURE: 134 MMHG

## 2022-03-24 DIAGNOSIS — E87.5 HYPERKALEMIA: ICD-10-CM

## 2022-03-24 DIAGNOSIS — F41.9 ANXIETY AND DEPRESSION: ICD-10-CM

## 2022-03-24 DIAGNOSIS — J44.9 CHRONIC OBSTRUCTIVE PULMONARY DISEASE, UNSPECIFIED COPD TYPE (HCC): ICD-10-CM

## 2022-03-24 DIAGNOSIS — E55.9 VITAMIN D DEFICIENCY: ICD-10-CM

## 2022-03-24 DIAGNOSIS — E53.8 VITAMIN B12 DEFICIENCY: ICD-10-CM

## 2022-03-24 DIAGNOSIS — Z12.11 COLON CANCER SCREENING: ICD-10-CM

## 2022-03-24 DIAGNOSIS — D68.9 COAGULATION DEFECT (HCC): ICD-10-CM

## 2022-03-24 DIAGNOSIS — H25.9 SENILE CATARACT OF LEFT EYE, UNSPECIFIED AGE-RELATED CATARACT TYPE: ICD-10-CM

## 2022-03-24 DIAGNOSIS — E66.01 SEVERE OBESITY (BMI 35.0-35.9 WITH COMORBIDITY) (HCC): ICD-10-CM

## 2022-03-24 DIAGNOSIS — N18.9 CHRONIC RENAL IMPAIRMENT, UNSPECIFIED CKD STAGE: ICD-10-CM

## 2022-03-24 DIAGNOSIS — Z72.0 TOBACCO USE: ICD-10-CM

## 2022-03-24 DIAGNOSIS — R10.9 ABDOMINAL PAIN, UNSPECIFIED ABDOMINAL LOCATION: ICD-10-CM

## 2022-03-24 DIAGNOSIS — K21.9 GASTROESOPHAGEAL REFLUX DISEASE WITHOUT ESOPHAGITIS: ICD-10-CM

## 2022-03-24 DIAGNOSIS — E78.2 MIXED HYPERLIPIDEMIA: Primary | ICD-10-CM

## 2022-03-24 DIAGNOSIS — M1A.9XX0 CHRONIC GOUT WITHOUT TOPHUS, UNSPECIFIED CAUSE, UNSPECIFIED SITE: ICD-10-CM

## 2022-03-24 DIAGNOSIS — I10 ESSENTIAL HYPERTENSION: ICD-10-CM

## 2022-03-24 DIAGNOSIS — F32.A ANXIETY AND DEPRESSION: ICD-10-CM

## 2022-03-24 DIAGNOSIS — R10.13 DYSPEPSIA: ICD-10-CM

## 2022-03-24 DIAGNOSIS — E11.65 TYPE 2 DIABETES MELLITUS WITH HYPERGLYCEMIA, WITHOUT LONG-TERM CURRENT USE OF INSULIN (HCC): ICD-10-CM

## 2022-03-24 LAB — POTASSIUM SERPL-SCNC: 5.1 MMOL/L (ref 3.5–5)

## 2022-03-24 PROCEDURE — 99215 OFFICE O/P EST HI 40 MIN: CPT | Performed by: FAMILY MEDICINE

## 2022-03-24 PROCEDURE — G8417 CALC BMI ABV UP PARAM F/U: HCPCS | Performed by: FAMILY MEDICINE

## 2022-03-24 PROCEDURE — 3044F HG A1C LEVEL LT 7.0%: CPT | Performed by: FAMILY MEDICINE

## 2022-03-24 PROCEDURE — G8427 DOCREV CUR MEDS BY ELIG CLIN: HCPCS | Performed by: FAMILY MEDICINE

## 2022-03-24 PROCEDURE — 1036F TOBACCO NON-USER: CPT | Performed by: FAMILY MEDICINE

## 2022-03-24 PROCEDURE — 3023F SPIROM DOC REV: CPT | Performed by: FAMILY MEDICINE

## 2022-03-24 PROCEDURE — 3017F COLORECTAL CA SCREEN DOC REV: CPT | Performed by: FAMILY MEDICINE

## 2022-03-24 PROCEDURE — 1123F ACP DISCUSS/DSCN MKR DOCD: CPT | Performed by: FAMILY MEDICINE

## 2022-03-24 PROCEDURE — 4040F PNEUMOC VAC/ADMIN/RCVD: CPT | Performed by: FAMILY MEDICINE

## 2022-03-24 PROCEDURE — 2022F DILAT RTA XM EVC RTNOPTHY: CPT | Performed by: FAMILY MEDICINE

## 2022-03-24 PROCEDURE — G8482 FLU IMMUNIZE ORDER/ADMIN: HCPCS | Performed by: FAMILY MEDICINE

## 2022-03-24 RX ORDER — OMEPRAZOLE 20 MG/1
20 CAPSULE, DELAYED RELEASE ORAL DAILY
Qty: 30 CAPSULE | Refills: 3 | Status: SHIPPED
Start: 2022-03-24 | End: 2022-06-23 | Stop reason: SDUPTHER

## 2022-03-24 NOTE — PROGRESS NOTES
Socorro Dia : 1950 Sex: male  Age: 73 y.o. Chief Complaint   Patient presents with    3 Month Follow-Up    Discuss Labs    Cataract     left eye     Gastroesophageal Reflux       HPI  HPI:      Patient presents today with his wife for follow-up. Overall feels well. He has been having reflux symptoms particularly with spicy foods. Worse if he eats late at night. Has not been on any medication for this, previously Pepcid. States it has been going on for quite a while but never really mentioned. Wonders about his gallbladder. Does get some indigestion symptoms when he eats. No nausea vomiting or change in bowels      Past screening lung LDCT did show cholelithiasis    Chronic knee pain, following with Ortho   Unfortunately still smoking. He and his wife are thinking about \"stopping\". They have cut their amount in half. Defers assistance. Counseled extensively on smoking cessation. Declines referral to pulmonology, PFTs. Defers AAA screen. Sees work physician for h/o asbestos exposure. They do cxr and pfts. Again had LDCT       Has had issues with significant left cataract. Delayed intervention because of Covid, would like referred back to Dr. Jenny JUNE  Willing to do fit test, previously ordered Cologuard and encouraged, defers colonoscopy.   Not interested in Shingrix because of cost    Blood work reviewed, CMP abnormalities include potassium 5.2, going to repeat today, glucose 105 uric acid elevated at 9.4, not take allopurinol, thought it perhaps made his joints hurt, we discussed alternative versus lower dosehe simply wants to resume 300 mg this time, HDL 30 HDL 66 triglyceride 136 hemoglobin A1c 5.7 TSH 1.4 vitamin D 36 CBC with differential normal urinalysis negative microalbumin creatinine ratio negative        Most Recent Labs  CBC  Lab Results   Component Value Date    WBC 6.1 2022    WBC 6.4 2021    WBC 4.7 2021    RBC 5.25 2022 RBC 5.23 11/09/2021    RBC 5.23 07/02/2021    HGB 15.3 03/17/2022    HGB 15.6 11/09/2021    HGB 15.7 07/02/2021    HCT 47.7 03/17/2022    HCT 46.3 11/09/2021    HCT 47.0 07/02/2021    MCV 90.9 03/17/2022    MCV 88.5 11/09/2021    MCV 89.9 07/02/2021     03/17/2022     11/09/2021     07/02/2021      CMP  Lab Results   Component Value Date     03/17/2022     11/09/2021     07/02/2021    K 5.2 03/17/2022    K 4.8 11/09/2021    K 4.9 07/02/2021     03/17/2022     11/09/2021     07/02/2021    CO2 26 03/17/2022    CO2 23 11/09/2021    CO2 27 07/02/2021    ANIONGAP 11 03/17/2022    ANIONGAP 12 11/09/2021    ANIONGAP 9 07/02/2021    GLUCOSE 105 03/17/2022    GLUCOSE 106 11/09/2021    GLUCOSE 133 07/02/2021    GLUCOSE 107 04/10/2012    GLUCOSE 105 11/29/2011    GLUCOSE 102 11/03/2011    BUN 15 03/17/2022    BUN 15 11/09/2021    BUN 16 07/02/2021    CREATININE 1.2 03/17/2022    CREATININE 1.1 11/09/2021    CREATININE 1.1 07/02/2021    LABGLOM 60 03/17/2022    LABGLOM >60 11/09/2021    LABGLOM >60 07/02/2021    GFRAA >60 03/17/2022    GFRAA >60 11/09/2021    GFRAA >60 07/02/2021    CALCIUM 9.8 03/17/2022    CALCIUM 9.6 11/09/2021    CALCIUM 9.4 07/02/2021    PROT 7.2 03/17/2022    PROT 7.2 11/09/2021    PROT 7.4 07/02/2021    LABALBU 4.3 03/17/2022    LABALBU 4.2 11/09/2021    LABALBU 4.3 07/02/2021    LABALBU 4.5 04/10/2012    LABALBU 4.4 11/29/2011    LABALBU 4.8 08/18/2011    BILITOT 0.6 03/17/2022    BILITOT 0.6 11/09/2021    BILITOT 0.7 07/02/2021    ALKPHOS 51 03/17/2022    ALKPHOS 48 11/09/2021    ALKPHOS 55 07/02/2021    AST 29 03/17/2022    AST 30 11/09/2021    AST 38 07/02/2021    ALT 26 03/17/2022    ALT 35 11/09/2021    ALT 31 07/02/2021     A1C  Lab Results   Component Value Date    LABA1C 5.7 03/17/2022    LABA1C 5.4 11/09/2021    LABA1C 5.6 07/02/2021     TSH  Lab Results   Component Value Date    TSH 1.440 03/17/2022    TSH 1.610 11/09/2021    TSH 1.500 07/02/2021     FREET4  No results found for: A1RVSUY  LIPID  Lab Results   Component Value Date    CHOL 123 03/17/2022    CHOL 139 11/09/2021    CHOL 120 07/02/2021    HDL 30 03/17/2022    HDL 38 11/09/2021    HDL 29 07/02/2021    LDLCALC 66 03/17/2022    LDLCALC 61 11/09/2021    LDLCALC 57 07/02/2021    TRIG 136 03/17/2022    TRIG 198 11/09/2021    TRIG 171 07/02/2021     VITAMIN D  Lab Results   Component Value Date    VITD25 36 03/17/2022    VITD25 37 11/09/2021    VITD25 37 07/02/2021     MAGNESIUM  Lab Results   Component Value Date    MG 2.6 06/20/2011    MG 2.3 06/11/2011    MG 2.3 06/10/2011      PHOS  Lab Results   Component Value Date    PHOS 2.5 06/11/2011    PHOS 4.1 06/10/2011    PHOS 4.2 06/09/2011      MARIA GUADALUPE   Lab Results   Component Value Date    MARIA GUADALUPE NEGATIVE 08/30/2015    MARIA GUADALUPE NEGATIVE 08/18/2011    MARIA GUADALUPE NEGATIVE 06/06/2011     RHEUMATOID FACTOR  Lab Results   Component Value Date    RF <10 08/30/2015    RF <4 08/18/2011    RF <4 06/06/2011     PSA  Lab Results   Component Value Date    PSA 1.81 07/02/2021    PSA 3.53 07/20/2020    PSA 2.23 04/25/2019      HEPATITIS C  Lab Results   Component Value Date    HCVABI Non-Reactive 06/11/2018     HIV  No results found for: BDP8FXS, HIV1QT  UA  Lab Results   Component Value Date    COLORU Yellow 03/17/2022    COLORU Yellow 11/09/2021    COLORU Yellow 07/02/2021    CLARITYU Clear 03/17/2022    CLARITYU Clear 11/09/2021    CLARITYU Clear 07/02/2021    GLUCOSEU Negative 03/17/2022    GLUCOSEU Negative 11/09/2021    GLUCOSEU Negative 07/02/2021    GLUCOSEU NEGATIVE 08/18/2011    GLUCOSEU NEGATIVE 06/08/2011    GLUCOSEU NEGATIVE 04/25/2011    BILIRUBINUR Negative 03/17/2022    BILIRUBINUR Negative 11/09/2021    BILIRUBINUR Negative 07/02/2021    BILIRUBINUR NEGATIVE 08/18/2011    BILIRUBINUR NEGATIVE 06/08/2011    BILIRUBINUR NEGATIVE 04/25/2011    KETUA Negative 03/17/2022    KETUA Negative 11/09/2021    KETUA Negative 07/02/2021    SPECGRAV 1.025 03/17/2022 SPECGRAV 1.025 11/09/2021    SPECGRAV >=1.030 07/02/2021    BLOODU Negative 03/17/2022    BLOODU TRACE-LYSED 11/09/2021    BLOODU Negative 07/02/2021    PHUR 5.0 03/17/2022    PHUR 5.5 11/09/2021    PHUR 5.5 07/02/2021    PROTEINU Negative 03/17/2022    PROTEINU Negative 11/09/2021    PROTEINU Negative 07/02/2021    UROBILINOGEN 0.2 03/17/2022    UROBILINOGEN 0.2 11/09/2021    UROBILINOGEN 0.2 07/02/2021    NITRU Negative 03/17/2022    NITRU Negative 11/09/2021    NITRU Negative 07/02/2021    LEUKOCYTESUR Negative 03/17/2022    LEUKOCYTESUR Negative 11/09/2021    LEUKOCYTESUR Negative 07/02/2021     Urine Micro/Albumin Ratio  Lab Results   Component Value Date    MALBCR 9.6 03/17/2022    MALBCR 22.4 11/09/2021    MALBCR 8.1 07/02/2021       Review of Systems  ROS:  Const: Denies chills, fever, malaise and sweats. Eyes: Denies discharge, pain, redness and, chronic left visual disturbance  ENMT: Denies earaches, other ear symptoms. Denies nasal or sinus symptoms other than stated  above. Denies mouth and tongue lesions and sore throat. CV: Denies chest discomfort, pain; diaphoresis, dizziness, edema, lightheadedness, orthopnea,  palpitations, syncope and near syncopal episode or any exertional symptoms  Resp: Denies cough, hemoptysis, pleuritic pain, SOB, sputum production and wheezing. GI: Denieschange in bowel habits, hematochezia, melena, nausea and vomiting. Positive reflux symptoms and dyspepsia  : Denies urinary symptoms including dysuria , urgency, frequency or hematuria. Musculo: Chronic low back pain with limited range of motion, chronic knee pain. OA. Skin: Denies bruising and rash.   Neuro: Denies headache, numbness, stiff neck, tingling and focal weakness slurred speech or facial  droop  Hema/Lymph: Denies bleeding/bruising tendency and enlarged lymph nodes        Current Outpatient Medications:     omeprazole (PRILOSEC) 20 MG delayed release capsule, Take 1 capsule by mouth daily Ideally 1hr prior to dinner, Disp: 30 capsule, Rfl: 3    allopurinol (ZYLOPRIM) 300 MG tablet, Take 1 tablet by mouth daily, Disp: 90 tablet, Rfl: 3    Multiple Vitamin (MULTIVITAMIN PO), Take by mouth daily, Disp: , Rfl:     colchicine (COLCRYS) 0.6 MG tablet, Two tablets by mouth x 1 at first sign of gout, then one tablet one hour later, Disp: 30 tablet, Rfl: 0    FLUoxetine (PROZAC) 20 MG capsule, Take 1 capsule by mouth daily, Disp: 90 capsule, Rfl: 3    metoprolol succinate (TOPROL XL) 25 MG extended release tablet, Take 0.5 tablets by mouth daily, Disp: 45 tablet, Rfl: 3    rosuvastatin (CRESTOR) 10 MG tablet, Take 1 tablet by mouth daily, Disp: 90 tablet, Rfl: 3    tiotropium (SPIRIVA) 18 MCG inhalation capsule, Inhale 1 capsule into the lungs daily, Disp: 90 capsule, Rfl: 3    albuterol sulfate  (90 Base) MCG/ACT inhaler, 1-2 puffs q4-6hrs prn, Disp: 3 Inhaler, Rfl: 6    vitamin D (ERGOCALCIFEROL) 1.25 MG (61014 UT) CAPS capsule, Take 1 capsule by mouth once a week, Disp: 12 capsule, Rfl: 3    aspirin 81 MG EC tablet, Take 81 mg by mouth daily. , Disp: , Rfl:   No Known Allergies    Past Medical History:   Diagnosis Date    CAD (coronary artery disease)     COPD [facility]chronic obstructive pulmonary disease) ([facility] [address]     Hyperlipidemia     Hypertension      Past Surgical History:   Procedure Laterality Date    CORONARY ARTERY BYPASS GRAFT  06/09/11    ACB X 5- DR. ROBLEDO    DIAGNOSTIC CARDIAC CATH LAB PROCEDURE  06/08/11    DR. VÁSQUEZ    TONSILLECTOMY AND ADENOIDECTOMY       Family History   Problem Relation Age of Onset    Thyroid Disease Mother     Heart Attack Father 61    Hypertension Father     Hypertension Sister     Heart Surgery Sister     Cirrhosis Brother     Hypertension Sister     Heart Surgery Sister     Heart Surgery Sister     Hypertension Sister      Social History     Tobacco Use    Smoking status: Former Smoker     Packs/day: 1.00     Years: 45.00     Pack years: 45.00 Types: Cigarettes     Start date: 1970     Quit date: 2021     Years since quittin.5    Smokeless tobacco: Never Used   Vaping Use    Vaping Use: Never used   Substance Use Topics    Alcohol use: Yes     Comment: occassionally on holidays    Drug use: No      Social History     Social History Narrative        PMH:    Problem List: 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 FH:    Father:     age 61 - MI - hypertension. Mother:     age 66 - post-op complications - thyroid, reflux - post op after hip fx (septic). Brother -  52's cirrhosis from alcoholism    Brother (twin identical) htn (he follows with physicians regularly)    Sisters x 3 - all with HTN and 1 with CABG age 71 (stent 76)    Reviewed, no changes. SH:    Marital: Legal Status: . Previously Worked as , asbestos exposure, follows with work physicians    Personal Habits: Cigarette Use: Former Cigarette Smoker - quit ., now smoking again as of . Alcohol: Denies alcohol use. FH:  Father:   age 61 - MI - hypertension. Mother:   age 66 - post-op complications - thyroid, reflux - post op after hip fx (septic).   Brother -  52's cirrhosis from alcoholism  Brother (twin identical) htn (he follows with physicians regularly)  Sisters x 3 - all with HTN and 1 with CABG age 71 (stent 76)                  Vitals:    22 1257   BP: 134/72   Pulse: 65   Temp: 97.6 °F (36.4 °C)   SpO2: 96%   Weight: 206 lb (93.4 kg)      Wt Readings from Last 3 Encounters:   22 206 lb (93.4 kg)   21 209 lb (94.8 kg)   21 205 lb (93 kg)        Physical Exam  Exam:  Const: Appears comfortable. No signs of acute distress present. Head/Face: Atraumatic on inspection. Eyes: EOMI in both eyes. No discharge from the eyes. PERRL. Sclerae clear. ENMT: Auditory canals normal. Tympanic membranes: intact and translucent. External nose WNL. Nasal mucosa is clear. Oropharynx: No erythema or exudate. Posterior pharynx is normal.  Neck: Supple. Palpation reveals no adenopathy. No masses appreciated. No JVD. Carotids: no  bruits. Resp: Respirations are unlabored. Clear to auscultation. No rales, rhonchi or wheezes appreciated  over the lungs bilaterally. CV: Rate is regular. Rhythm is regular. No gallop or rubs. No heart murmur appreciated. Extremities: No clubbing, cyanosis, or edema. No calf inflammation or tenderness. Abdomen: Bowel sounds are normoactive. Abdomen is soft, nontender, and nondistended. No  abdominal masses. No palpable hepatosplenomegaly. Lymph: No palpable or visible regional lymphadenopathy. Musculoskeletal: no acute joint inflammation except chronic low back pain with limited range of motion. . Crepitus bilateral knees  Skin: Dry and warm with no rash. Skin normal to inspection and palpation overall. Neuro: Alert and oriented. Affect: appropriate. Upper Extremities: 5/5 bilaterally. Lower Extremities:  5/5 bilaterally. Sensation intact to light touch. Reflexes: DTR's are symmetric and 2+ bilaterally. .  Cranial Nerves: Cranial nerves grossly intact. Rectaldefers      Assessment and Plan:   Diagnosis Orders   1. Mixed hyperlipidemia  CK    Comprehensive Metabolic Panel    Hemoglobin A1C    Lipid Panel    TSH   2. Chronic obstructive pulmonary disease, unspecified COPD type ([facility] [address]     3. Type 2 diabetes mellitus with hyperglycemia, without long-term current use of insulin (HCC)  CBC with Auto Differential    Comprehensive Metabolic Panel    Hemoglobin A1C    Urinalysis    Microalbumin / Creatinine Urine Ratio    TSH   4. medical examination with bnormal  findings  Care Plan:  Comments : Health maintenance issues discussed at length 8/11/2021 encouraged yearly  Physicals. .  Defers Shingrix. Atherosclerotic heart disease of native coronary artery without angina  pectoris  Care Plan:  Comments : Status post CABG. asx. encouraged fu w/ cardiologist, previously dr Darnell Martinez,  declines. currently off ACE inhibitor/ARB and declines at this time. Continue per cardiology. Stress test 8/21 was low risk      Type 2 diabetes mellitus with hyperglycemia  Care Plan:  Comments :  extensively. watch BS closely ambulatory. Parameters given. Call if not  in range. ER if dangerous numbers. Macro and microvascular complications  reviewed. Importance of at least yearly eye exams and daily foot exams  reviewed. Risks and benefits of insulin sensitizers reviewed and he declines  now. Now resolved, hemoglobin A1c has remained stable 5.7-5.55.6 5.45.7. Gout, unspecified  Care Plan:  Comments : Emphasized low uric acid diet. Emphasized. Proper hydration. He has colcrys prn,2×1 at onset of  symptoms and repeat one hour later. .  reviewed. Uric acid goals reviewed. Risk of hyperuricemia reviewed. Has not been taking allopurinol, was supposed be on 3 mg daily. He felt it made his joints hurt. We discussed alternatives versus lower dose would like to resume 300 now will let us know if any issues. Chronic obstructive pulmonary disease, unspecified  Care Plan:  Comments : Asymptomatic. Of the CT 7/21 - for nodules. Defers pulmonary Function Tests, referral or otherwise. Currently on  Spiriva and p.r.n. Proair which he rarely needs although a little more in the fall.     Intervertebral disc disorders with myelopathy, lumbar region  Care Plan:   Status post injections through [facility], then surgery with  discectomy/laminectomy through Dr. Melba Doe.  Continue per them. h/o PT     Vitamin D deficiency, unspecified  Care Plan:  Comments : Stable on prescription vitamin D,, continue monitoring     Disorder of prostate, unspecified  Care Plan:  Comments : Counseled extensively. Differential reviewed, including serious etiologies. rising  PSA is trending down from 2.6-2.2-3.531. 81. He should follow-up with Dr. Leeanne Huizar, deferring now       Secondary polycythemia  Care Plan:  Comments : Likely related to smoking, other differential reviewed. Monitor. Declines further  eval/tx. currently normal    Dysthymic disorder  Care Plan:  Comments : Stable. Continue current therapy. r/b meds reviewed.        CRI-  Counseled. Proper hydration. Avoid nephrotoxic agents monitor. Declines imaging or referral creatinine stable, currently normal         Tobacco abuse-counseled extensively on importance of abstinence. States he gets chest x-ray and PFTs at work but, defers REF. LDCT 7/trauma. Cut back, thinking about stopping, declines assistance    Bilateral knee OA  Failed conservative measures. Continue per [facility]  Topicals reviewed. R/B Tylenol reviewed. Avoidance of NSAID recommendations reviewed  Obesity  Counseled. Risk reviewed. Lifestyle modification emphasized. Counseled on Mediterranean diet/weight watchers. Declines formal invention. B12 deficiency  History of. Risk of low and high reviewed. Check level. Appropriate supplementation reviewed    Hyperkalemia  Encourage counseled counseled on possible causes and consequences. Counseled on TP versus FP . Encourage avoiding excessive intake, encourage proper hydration, avoid supplements       FPNo problem-specific Assessment & Plan notes found for this encounter. Plan as above. Counseled extensively.   Continue per specialists, continue current management, resume allopurinol, refer to Dr. Michelle Fountain, refer to eye care, repeat potassium, fit test, low uric acid Mediterranean diet, proper hydration, check gallbladder ultrasound, otherwise simply with blood work and follow-up in 3 months sooner as needed. No flowsheet data found. Counseled extensively and differential diagnoses relevant to above were reviewed, including serious etiologies. Side effects and interactions of medications were reviewed. As long as symptoms steadily improve/resolve, and medical conditions follow the expected course, FU as below, sooner PRN. Return in about 3 months (around 6/24/2022), or if symptoms worsen or fail to improve. Signs and symptoms to watch for discussed, serious signs and symptoms reviewed. ER if any. Over 40 minutes  spent with the patient in reviewing records, reviewing with patient/family, counseling, ordering,  prescribing, completing h&p, etc., with over 50% of the time spent face to face counseling. Joel Hilton MD    Patients are advised to check with insurance company to ensure coverage and to fully understand benefits and cost prior to any testing. This note was created with the assistance of voice recognition software. Document was reviewed however may contain grammatical errors.

## 2022-03-25 NOTE — RESULT ENCOUNTER NOTE
Very minimal elevation, 1 from 5.2-5.1. Continue to encourage proper hydration. Ensure not taking potassium supplement. Avoid excessive potassium intake.   Recheck 2 weeks sooner if symptoms

## 2022-06-16 DIAGNOSIS — M1A.9XX0 CHRONIC GOUT WITHOUT TOPHUS, UNSPECIFIED CAUSE, UNSPECIFIED SITE: ICD-10-CM

## 2022-06-16 DIAGNOSIS — E53.8 VITAMIN B12 DEFICIENCY: ICD-10-CM

## 2022-06-16 DIAGNOSIS — R10.13 DYSPEPSIA: ICD-10-CM

## 2022-06-16 DIAGNOSIS — E55.9 VITAMIN D DEFICIENCY: ICD-10-CM

## 2022-06-16 DIAGNOSIS — K21.9 GASTROESOPHAGEAL REFLUX DISEASE WITHOUT ESOPHAGITIS: ICD-10-CM

## 2022-06-16 DIAGNOSIS — E11.65 TYPE 2 DIABETES MELLITUS WITH HYPERGLYCEMIA, WITHOUT LONG-TERM CURRENT USE OF INSULIN (HCC): ICD-10-CM

## 2022-06-16 DIAGNOSIS — I10 ESSENTIAL HYPERTENSION: ICD-10-CM

## 2022-06-16 DIAGNOSIS — E78.2 MIXED HYPERLIPIDEMIA: ICD-10-CM

## 2022-06-16 LAB
ALBUMIN SERPL-MCNC: 4.5 G/DL (ref 3.5–5.2)
ALP BLD-CCNC: 53 U/L (ref 40–129)
ALT SERPL-CCNC: 22 U/L (ref 0–40)
ANION GAP SERPL CALCULATED.3IONS-SCNC: 18 MMOL/L (ref 7–16)
AST SERPL-CCNC: 28 U/L (ref 0–39)
BASOPHILS ABSOLUTE: 0.06 E9/L (ref 0–0.2)
BASOPHILS RELATIVE PERCENT: 0.9 % (ref 0–2)
BILIRUB SERPL-MCNC: 0.6 MG/DL (ref 0–1.2)
BILIRUBIN URINE: NEGATIVE
BLOOD, URINE: NEGATIVE
BUN BLDV-MCNC: 13 MG/DL (ref 6–23)
CALCIUM SERPL-MCNC: 9.8 MG/DL (ref 8.6–10.2)
CHLORIDE BLD-SCNC: 102 MMOL/L (ref 98–107)
CHOLESTEROL, TOTAL: 139 MG/DL (ref 0–199)
CLARITY: CLEAR
CO2: 22 MMOL/L (ref 22–29)
COLOR: YELLOW
CREAT SERPL-MCNC: 1.1 MG/DL (ref 0.7–1.2)
CREATININE URINE: 157 MG/DL (ref 40–278)
EOSINOPHILS ABSOLUTE: 0.38 E9/L (ref 0.05–0.5)
EOSINOPHILS RELATIVE PERCENT: 6 % (ref 0–6)
FOLATE: >20 NG/ML (ref 4.8–24.2)
GFR AFRICAN AMERICAN: >60
GFR NON-AFRICAN AMERICAN: >60 ML/MIN/1.73
GLUCOSE BLD-MCNC: 118 MG/DL (ref 74–99)
GLUCOSE URINE: NEGATIVE MG/DL
HBA1C MFR BLD: 5.8 % (ref 4–5.6)
HCT VFR BLD CALC: 47.5 % (ref 37–54)
HDLC SERPL-MCNC: 36 MG/DL
HEMOGLOBIN: 15.6 G/DL (ref 12.5–16.5)
IMMATURE GRANULOCYTES #: 0.02 E9/L
IMMATURE GRANULOCYTES %: 0.3 % (ref 0–5)
KETONES, URINE: NEGATIVE MG/DL
LDL CHOLESTEROL CALCULATED: 62 MG/DL (ref 0–99)
LEUKOCYTE ESTERASE, URINE: NEGATIVE
LYMPHOCYTES ABSOLUTE: 1.93 E9/L (ref 1.5–4)
LYMPHOCYTES RELATIVE PERCENT: 30.3 % (ref 20–42)
MCH RBC QN AUTO: 29.6 PG (ref 26–35)
MCHC RBC AUTO-ENTMCNC: 32.8 % (ref 32–34.5)
MCV RBC AUTO: 90.1 FL (ref 80–99.9)
MICROALBUMIN UR-MCNC: 15.9 MG/L
MICROALBUMIN/CREAT UR-RTO: 10.1 (ref 0–30)
MONOCYTES ABSOLUTE: 0.56 E9/L (ref 0.1–0.95)
MONOCYTES RELATIVE PERCENT: 8.8 % (ref 2–12)
NEUTROPHILS ABSOLUTE: 3.42 E9/L (ref 1.8–7.3)
NEUTROPHILS RELATIVE PERCENT: 53.7 % (ref 43–80)
NITRITE, URINE: NEGATIVE
PDW BLD-RTO: 14.2 FL (ref 11.5–15)
PH UA: 5.5 (ref 5–9)
PLATELET # BLD: 140 E9/L (ref 130–450)
PMV BLD AUTO: 9.9 FL (ref 7–12)
POTASSIUM SERPL-SCNC: 4.5 MMOL/L (ref 3.5–5)
PROTEIN UA: NEGATIVE MG/DL
RBC # BLD: 5.27 E12/L (ref 3.8–5.8)
SODIUM BLD-SCNC: 142 MMOL/L (ref 132–146)
SPECIFIC GRAVITY UA: 1.02 (ref 1–1.03)
TOTAL CK: 109 U/L (ref 20–200)
TOTAL PROTEIN: 7.5 G/DL (ref 6.4–8.3)
TRIGL SERPL-MCNC: 205 MG/DL (ref 0–149)
TSH SERPL DL<=0.05 MIU/L-ACNC: 1.9 UIU/ML (ref 0.27–4.2)
URIC ACID, SERUM: 5.8 MG/DL (ref 3.4–7)
UROBILINOGEN, URINE: 0.2 E.U./DL
VITAMIN B-12: 515 PG/ML (ref 211–946)
VITAMIN D 25-HYDROXY: 42 NG/ML (ref 30–100)
VLDLC SERPL CALC-MCNC: 41 MG/DL
WBC # BLD: 6.4 E9/L (ref 4.5–11.5)

## 2022-06-22 LAB — HELICOBACTER PYLORI IGG: NORMAL

## 2022-06-23 ENCOUNTER — OFFICE VISIT (OUTPATIENT)
Dept: PRIMARY CARE CLINIC | Age: 72
End: 2022-06-23
Payer: MEDICARE

## 2022-06-23 VITALS
SYSTOLIC BLOOD PRESSURE: 130 MMHG | HEART RATE: 67 BPM | OXYGEN SATURATION: 96 % | WEIGHT: 204 LBS | DIASTOLIC BLOOD PRESSURE: 72 MMHG | BODY MASS INDEX: 35.02 KG/M2 | TEMPERATURE: 97.8 F

## 2022-06-23 DIAGNOSIS — F32.A ANXIETY AND DEPRESSION: ICD-10-CM

## 2022-06-23 DIAGNOSIS — K21.9 GASTROESOPHAGEAL REFLUX DISEASE WITHOUT ESOPHAGITIS: ICD-10-CM

## 2022-06-23 DIAGNOSIS — E53.8 VITAMIN B12 DEFICIENCY: ICD-10-CM

## 2022-06-23 DIAGNOSIS — N18.9 CHRONIC RENAL IMPAIRMENT, UNSPECIFIED CKD STAGE: Primary | ICD-10-CM

## 2022-06-23 DIAGNOSIS — J44.9 CHRONIC OBSTRUCTIVE PULMONARY DISEASE, UNSPECIFIED COPD TYPE (HCC): ICD-10-CM

## 2022-06-23 DIAGNOSIS — I10 ESSENTIAL HYPERTENSION: ICD-10-CM

## 2022-06-23 DIAGNOSIS — E11.65 TYPE 2 DIABETES MELLITUS WITH HYPERGLYCEMIA, WITHOUT LONG-TERM CURRENT USE OF INSULIN (HCC): ICD-10-CM

## 2022-06-23 DIAGNOSIS — H26.9 CATARACT, UNSPECIFIED CATARACT TYPE, UNSPECIFIED LATERALITY: ICD-10-CM

## 2022-06-23 DIAGNOSIS — E78.2 MIXED HYPERLIPIDEMIA: ICD-10-CM

## 2022-06-23 DIAGNOSIS — E66.01 CLASS 2 SEVERE OBESITY DUE TO EXCESS CALORIES WITH SERIOUS COMORBIDITY AND BODY MASS INDEX (BMI) OF 35.0 TO 35.9 IN ADULT (HCC): ICD-10-CM

## 2022-06-23 DIAGNOSIS — M1A.9XX0 CHRONIC GOUT WITHOUT TOPHUS, UNSPECIFIED CAUSE, UNSPECIFIED SITE: ICD-10-CM

## 2022-06-23 DIAGNOSIS — E55.9 VITAMIN D DEFICIENCY: ICD-10-CM

## 2022-06-23 DIAGNOSIS — F41.9 ANXIETY AND DEPRESSION: ICD-10-CM

## 2022-06-23 DIAGNOSIS — A04.8 H. PYLORI INFECTION: ICD-10-CM

## 2022-06-23 PROBLEM — D68.9 COAGULATION DEFECT (HCC): Status: RESOLVED | Noted: 2021-07-09 | Resolved: 2022-06-23

## 2022-06-23 PROBLEM — E66.812 CLASS 2 SEVERE OBESITY DUE TO EXCESS CALORIES WITH SERIOUS COMORBIDITY AND BODY MASS INDEX (BMI) OF 35.0 TO 35.9 IN ADULT: Status: ACTIVE | Noted: 2021-07-09

## 2022-06-23 PROCEDURE — 3044F HG A1C LEVEL LT 7.0%: CPT | Performed by: FAMILY MEDICINE

## 2022-06-23 PROCEDURE — 1123F ACP DISCUSS/DSCN MKR DOCD: CPT | Performed by: FAMILY MEDICINE

## 2022-06-23 PROCEDURE — G8427 DOCREV CUR MEDS BY ELIG CLIN: HCPCS | Performed by: FAMILY MEDICINE

## 2022-06-23 PROCEDURE — G8417 CALC BMI ABV UP PARAM F/U: HCPCS | Performed by: FAMILY MEDICINE

## 2022-06-23 PROCEDURE — 1036F TOBACCO NON-USER: CPT | Performed by: FAMILY MEDICINE

## 2022-06-23 PROCEDURE — 2022F DILAT RTA XM EVC RTNOPTHY: CPT | Performed by: FAMILY MEDICINE

## 2022-06-23 PROCEDURE — 3023F SPIROM DOC REV: CPT | Performed by: FAMILY MEDICINE

## 2022-06-23 PROCEDURE — 3017F COLORECTAL CA SCREEN DOC REV: CPT | Performed by: FAMILY MEDICINE

## 2022-06-23 PROCEDURE — 99215 OFFICE O/P EST HI 40 MIN: CPT | Performed by: FAMILY MEDICINE

## 2022-06-23 RX ORDER — METRONIDAZOLE 500 MG/1
500 TABLET ORAL 3 TIMES DAILY
Qty: 42 TABLET | Refills: 0 | Status: SHIPPED | OUTPATIENT
Start: 2022-06-23 | End: 2022-07-07

## 2022-06-23 RX ORDER — ERGOCALCIFEROL 1.25 MG/1
50000 CAPSULE ORAL WEEKLY
Qty: 12 CAPSULE | Refills: 3 | Status: SHIPPED | OUTPATIENT
Start: 2022-06-23

## 2022-06-23 RX ORDER — CLARITHROMYCIN 500 MG/1
500 TABLET, COATED ORAL 2 TIMES DAILY
Qty: 28 TABLET | Refills: 0 | Status: SHIPPED | OUTPATIENT
Start: 2022-06-23 | End: 2022-07-07

## 2022-06-23 RX ORDER — ROSUVASTATIN CALCIUM 10 MG/1
10 TABLET, COATED ORAL DAILY
Qty: 90 TABLET | Refills: 3 | Status: SHIPPED | OUTPATIENT
Start: 2022-06-23

## 2022-06-23 RX ORDER — FLUOXETINE HYDROCHLORIDE 20 MG/1
20 CAPSULE ORAL DAILY
Qty: 90 CAPSULE | Refills: 3 | Status: SHIPPED | OUTPATIENT
Start: 2022-06-23

## 2022-06-23 RX ORDER — OMEPRAZOLE 20 MG/1
20 CAPSULE, DELAYED RELEASE ORAL DAILY
Qty: 90 CAPSULE | Refills: 3 | Status: SHIPPED | OUTPATIENT
Start: 2022-06-23

## 2022-06-23 RX ORDER — ALBUTEROL SULFATE 90 UG/1
AEROSOL, METERED RESPIRATORY (INHALATION)
Qty: 3 EACH | Refills: 3 | Status: SHIPPED | OUTPATIENT
Start: 2022-06-23

## 2022-06-23 RX ORDER — METOPROLOL SUCCINATE 25 MG/1
12.5 TABLET, EXTENDED RELEASE ORAL DAILY
Qty: 45 TABLET | Refills: 3 | Status: SHIPPED | OUTPATIENT
Start: 2022-06-23

## 2022-06-23 ASSESSMENT — PATIENT HEALTH QUESTIONNAIRE - PHQ9
7. TROUBLE CONCENTRATING ON THINGS, SUCH AS READING THE NEWSPAPER OR WATCHING TELEVISION: 0
5. POOR APPETITE OR OVEREATING: 0
1. LITTLE INTEREST OR PLEASURE IN DOING THINGS: 0
10. IF YOU CHECKED OFF ANY PROBLEMS, HOW DIFFICULT HAVE THESE PROBLEMS MADE IT FOR YOU TO DO YOUR WORK, TAKE CARE OF THINGS AT HOME, OR GET ALONG WITH OTHER PEOPLE: 0
SUM OF ALL RESPONSES TO PHQ QUESTIONS 1-9: 0
SUM OF ALL RESPONSES TO PHQ9 QUESTIONS 1 & 2: 0
2. FEELING DOWN, DEPRESSED OR HOPELESS: 0
3. TROUBLE FALLING OR STAYING ASLEEP: 0
6. FEELING BAD ABOUT YOURSELF - OR THAT YOU ARE A FAILURE OR HAVE LET YOURSELF OR YOUR FAMILY DOWN: 0
8. MOVING OR SPEAKING SO SLOWLY THAT OTHER PEOPLE COULD HAVE NOTICED. OR THE OPPOSITE, BEING SO FIGETY OR RESTLESS THAT YOU HAVE BEEN MOVING AROUND A LOT MORE THAN USUAL: 0
SUM OF ALL RESPONSES TO PHQ QUESTIONS 1-9: 0
9. THOUGHTS THAT YOU WOULD BE BETTER OFF DEAD, OR OF HURTING YOURSELF: 0
4. FEELING TIRED OR HAVING LITTLE ENERGY: 0

## 2022-06-23 NOTE — ASSESSMENT & PLAN NOTE
Counseled extensively. Differential reviewed, including serious etiologies. Risk benefits of intervention and various treatment options reviewed. He opts for triple therapy as below. He will hold Crestor while on Biaxin, risk benefits reviewed. Counseled on risk benefits of therapy in general including C. difficile, interactions, malabsorption. He has follow-up with Dr. Kristie Deluca in about 2 weeks and he will plan upper GI and EGD. EGD can be test of cure. In the meantime increase omeprazole to 20 mg twice a day for 2 weeks and then go back to once a day thereafter.   Start clarithromycin 500 mg twice a day and he opts for metronidazole over Amoxil 503 times a day, absolute contraindication with alcohol

## 2022-06-23 NOTE — PROGRESS NOTES
[facility] : 1950 Sex: male  Age: 81 y.o. Chief Complaint   Patient presents with    3 Month Follow-Up    Discuss Labs       Gastroesophageal Reflux      HPI:      Patient presents today with his wife for follow-up. Overall feels well. Reflux symptoms resolved with omeprazole 20 mg daily. He states he has had intermittent issues for 10 years. Was worse recently with spicy foods. Has been on Pepcid in the past.  H. pylori IgG in fact positive. Counseled extensively. Would like to proceed as below. Has follow-up next week with Dr. Lisseth Ram and planning EGD and upper GI          Past screening lung LDCT did show cholelithiasis    Chronic knee pain, following with Ortho   Unfortunately still smoking. He and his wife are thinking about \"stopping\". They have cut their amount in half. Defers assistance. Counseled extensively on smoking cessation. Declines referral to pulmonology, PFTs. Defers AAA screen. Sees work physician for h/o asbestos exposure. They do cxr and pfts. Again had LDCT       Planning cataract surgery, forms completed.           Blood work reviewed, CMP shows anion gap 18 glucose 118 with a hemoglobin A1c of 5.8 HDL up to 36 LDL down to 62 triglyceride 205, counseled TSH 1.9 vitamin D 42 CBC with differential normal vitamin I39 normal folic acid greater 20 H. pylori positive urinalysis negative microalbumin creatinine ratio-NC    Most Recent Labs  CBC  Lab Results   Component Value Date    WBC 6.4 2022    WBC 6.1 2022    WBC 6.4 2021    RBC 5.27 2022    RBC 5.25 2022    RBC 5.23 2021    HGB 15.6 2022    HGB 15.3 2022    HGB 15.6 2021    HCT 47.5 2022    HCT 47.7 2022    HCT 46.3 2021    MCV 90.1 2022    MCV 90.9 2022    MCV 88.5 2021     2022     2022     2021      CMP  Lab Results   Component Value Date     2022     03/17/2022     11/09/2021    K 4.5 06/16/2022    K 5.1 03/24/2022    K 5.2 03/17/2022     06/16/2022     03/17/2022     11/09/2021    CO2 22 06/16/2022    CO2 26 03/17/2022    CO2 23 11/09/2021    ANIONGAP 18 06/16/2022    ANIONGAP 11 03/17/2022    ANIONGAP 12 11/09/2021    GLUCOSE 118 06/16/2022    GLUCOSE 105 03/17/2022    GLUCOSE 106 11/09/2021    GLUCOSE 107 04/10/2012    GLUCOSE 105 11/29/2011    GLUCOSE 102 11/03/2011    BUN 13 06/16/2022    BUN 15 03/17/2022    BUN 15 11/09/2021    CREATININE 1.1 06/16/2022    CREATININE 1.2 03/17/2022    CREATININE 1.1 11/09/2021    LABGLOM >60 06/16/2022    LABGLOM 60 03/17/2022    LABGLOM >60 11/09/2021    GFRAA >60 06/16/2022    GFRAA >60 03/17/2022    GFRAA >60 11/09/2021    CALCIUM 9.8 06/16/2022    CALCIUM 9.8 03/17/2022    CALCIUM 9.6 11/09/2021    PROT 7.5 06/16/2022    PROT 7.2 03/17/2022    PROT 7.2 11/09/2021    LABALBU 4.5 06/16/2022    LABALBU 4.3 03/17/2022    LABALBU 4.2 11/09/2021    LABALBU 4.5 04/10/2012    LABALBU 4.4 11/29/2011    LABALBU 4.8 08/18/2011    BILITOT 0.6 06/16/2022    BILITOT 0.6 03/17/2022    BILITOT 0.6 11/09/2021    ALKPHOS 53 06/16/2022    ALKPHOS 51 03/17/2022    ALKPHOS 48 11/09/2021    AST 28 06/16/2022    AST 29 03/17/2022    AST 30 11/09/2021    ALT 22 06/16/2022    ALT 26 03/17/2022    ALT 35 11/09/2021     A1C  Lab Results   Component Value Date    LABA1C 5.8 06/16/2022    LABA1C 5.7 03/17/2022    LABA1C 5.4 11/09/2021     TSH  Lab Results   Component Value Date    TSH 1.900 06/16/2022    TSH 1.440 03/17/2022    TSH 1.610 11/09/2021     FREET4  No results found for: R1TDSLZ  LIPID  Lab Results   Component Value Date    CHOL 139 06/16/2022    CHOL 123 03/17/2022    CHOL 139 11/09/2021    HDL 36 06/16/2022    HDL 30 03/17/2022    HDL 38 11/09/2021    LDLCALC 62 06/16/2022    LDLCALC 66 03/17/2022    LDLCALC 61 11/09/2021    TRIG 205 06/16/2022    TRIG 136 03/17/2022    TRIG 198 11/09/2021     VITAMIN D  Lab Results   Component Value Date    VITD25 42 06/16/2022    VITD25 36 03/17/2022    VITD25 37 11/09/2021     MAGNESIUM  Lab Results   Component Value Date    MG 2.6 06/20/2011    MG 2.3 06/11/2011    MG 2.3 06/10/2011      PHOS  Lab Results   Component Value Date    PHOS 2.5 06/11/2011    PHOS 4.1 06/10/2011    PHOS 4.2 06/09/2011      MARIA GUADALUPE   Lab Results   Component Value Date    MARIA GUADALUPE NEGATIVE 08/30/2015    MARIA GUADALUPE NEGATIVE 08/18/2011    MARIA GUADALUPE NEGATIVE 06/06/2011     RHEUMATOID FACTOR  Lab Results   Component Value Date    RF <10 08/30/2015    RF <4 08/18/2011    RF <4 06/06/2011     PSA  Lab Results   Component Value Date    PSA 1.81 07/02/2021    PSA 3.53 07/20/2020    PSA 2.23 04/25/2019      HEPATITIS C  Lab Results   Component Value Date    HCVABI Non-Reactive 06/11/2018     HIV  No results found for: QYP8TDZ, HIV1QT  UA  Lab Results   Component Value Date    COLORU Yellow 06/16/2022    COLORU Yellow 03/17/2022    COLORU Yellow 11/09/2021    CLARITYU Clear 06/16/2022    CLARITYU Clear 03/17/2022    CLARITYU Clear 11/09/2021    GLUCOSEU Negative 06/16/2022    GLUCOSEU Negative 03/17/2022    GLUCOSEU Negative 11/09/2021    GLUCOSEU NEGATIVE 08/18/2011    GLUCOSEU NEGATIVE 06/08/2011    GLUCOSEU NEGATIVE 04/25/2011    BILIRUBINUR Negative 06/16/2022    BILIRUBINUR Negative 03/17/2022    BILIRUBINUR Negative 11/09/2021    BILIRUBINUR NEGATIVE 08/18/2011    BILIRUBINUR NEGATIVE 06/08/2011    BILIRUBINUR NEGATIVE 04/25/2011    KETUA Negative 06/16/2022    KETUA Negative 03/17/2022    KETUA Negative 11/09/2021    SPECGRAV 1.025 06/16/2022    SPECGRAV 1.025 03/17/2022    SPECGRAV 1.025 11/09/2021    BLOODU Negative 06/16/2022    BLOODU Negative 03/17/2022    BLOODU TRACE-LYSED 11/09/2021    PHUR 5.5 06/16/2022    PHUR 5.0 03/17/2022    PHUR 5.5 11/09/2021    PROTEINU Negative 06/16/2022    PROTEINU Negative 03/17/2022    PROTEINU Negative 11/09/2021    UROBILINOGEN 0.2 06/16/2022    UROBILINOGEN 0.2 03/17/2022    UROBILINOGEN 0.2 11/09/2021    NITRU Negative 06/16/2022    NITRU Negative 03/17/2022    NITRU Negative 11/09/2021    LEUKOCYTESUR Negative 06/16/2022    LEUKOCYTESUR Negative 03/17/2022    LEUKOCYTESUR Negative 11/09/2021     Urine Micro/Albumin Ratio  Lab Results   Component Value Date    MALBCR 10.1 06/16/2022    MALBCR 9.6 03/17/2022    MALBCR 22.4 11/09/2021       Review of Systems  ROS:  Const: Denies chills, fever, malaise and sweats. Eyes: Denies discharge, pain, redness and, chronic left visual disturbance  ENMT: Denies earaches, other ear symptoms. Denies nasal or sinus symptoms other than stated  above. Denies mouth and tongue lesions and sore throat. CV: Denies chest discomfort, pain; diaphoresis, dizziness, edema, lightheadedness, orthopnea,  palpitations, syncope and near syncopal episode or any exertional symptoms  Resp: Denies cough, hemoptysis, pleuritic pain, SOB, sputum production and wheezing. GI: Denieschange in bowel habits, hematochezia, melena, nausea and vomiting. Recent reflux and dyspepsia symptoms resolved on omeprazole 20 mg daily yet H. pylori positive  : Denies urinary symptoms including dysuria , urgency, frequency or hematuria. Musculo: Chronic low back pain with limited range of motion, chronic knee pain. OA. Skin: Denies bruising and rash.   Neuro: Denies headache, numbness, stiff neck, tingling and focal weakness slurred speech or facial  droop  Hema/Lymph: Denies bleeding/bruising tendency and enlarged lymph nodes        Current Outpatient Medications:     omeprazole (PRILOSEC) 20 MG delayed release capsule, Take 1 capsule by mouth daily Ideally 1hr prior to dinner, Disp: 90 capsule, Rfl: 3    rosuvastatin (CRESTOR) 10 MG tablet, Take 1 tablet by mouth daily, Disp: 90 tablet, Rfl: 3    tiotropium (SPIRIVA) 18 MCG inhalation capsule, Inhale 1 capsule into the lungs daily, Disp: 90 capsule, Rfl: 3    vitamin D (ERGOCALCIFEROL) 1.25 MG (88151 UT) CAPS capsule, Take 1 capsule by mouth once a week, Disp: 12 capsule, Rfl: 3    metoprolol succinate (TOPROL XL) 25 MG extended release tablet, Take 0.5 tablets by mouth daily, Disp: 45 tablet, Rfl: 3    FLUoxetine (PROZAC) 20 MG capsule, Take 1 capsule by mouth daily, Disp: 90 capsule, Rfl: 3    albuterol sulfate HFA (PROVENTIL;VENTOLIN;PROAIR) 108 (90 Base) MCG/ACT inhaler, 1-2 puffs q4-6hrs prn, Disp: 3 each, Rfl: 3    metroNIDAZOLE (FLAGYL) 500 MG tablet, Take 1 tablet by mouth 3 times daily for 14 days, Disp: 42 tablet, Rfl: 0    clarithromycin (BIAXIN) 500 MG tablet, Take 1 tablet by mouth 2 times daily for 14 days, Disp: 28 tablet, Rfl: 0    allopurinol (ZYLOPRIM) 300 MG tablet, Take 1 tablet by mouth daily, Disp: 90 tablet, Rfl: 3    Multiple Vitamin (MULTIVITAMIN PO), Take by mouth daily, Disp: , Rfl:     colchicine (COLCRYS) 0.6 MG tablet, Two tablets by mouth x 1 at first sign of gout, then one tablet one hour later, Disp: 30 tablet, Rfl: 0    aspirin 81 MG EC tablet, Take 81 mg by mouth daily. , Disp: , Rfl:   No Known Allergies    Past Medical History:   Diagnosis Date    CAD (coronary artery disease)     COPD (chronic obstructive pulmonary disease) ([facility] [address]     Hyperlipidemia     Hypertension      Past Surgical History:   Procedure Laterality Date    CORONARY ARTERY BYPASS GRAFT  06/09/11    ACB X 5- DR. ROBLEDO    DIAGNOSTIC CARDIAC CATH LAB PROCEDURE  06/08/11    DR. VÁSQUEZ    TONSILLECTOMY AND ADENOIDECTOMY       Family History   Problem Relation Age of Onset    Thyroid Disease Mother     Heart Attack Father 61    Hypertension Father     Hypertension Sister     Heart Surgery Sister     Cirrhosis Brother     Hypertension Sister     Heart Surgery Sister     Heart Surgery Sister     Hypertension Sister      Social History     Tobacco Use    Smoking status: Former Smoker     Packs/day: 1.00     Years: 45.00     Pack years: 45.00     Types: Cigarettes     Start date: 1/1/1970     Quit date: 8/26/2021     Years since quittin.8    Smokeless tobacco: Never Used   Vaping Use    Vaping Use: Never used   Substance Use Topics    Alcohol use: Yes     Comment: occassionally on holidays    Drug use: No      Social History     Social History Narrative        PMH:    Problem List: 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 FH:    Father:     age 61 - MI - hypertension. Mother:     age 66 - post-op complications - thyroid, reflux - post op after hip fx (septic). Brother -  52's cirrhosis from alcoholism    Brother (twin identical) htn (he follows with physicians regularly)    Sisters x 3 - all with HTN and 1 with CABG age 71 (stent 76)    Reviewed, no changes. SH:    Marital: Legal Status: . Previously Worked as , asbestos exposure, follows with work physicians    Personal Habits: Cigarette Use: Former Cigarette Smoker - quit ., now smoking again as of . Alcohol: Denies alcohol use. FH:  Father:   age 61 - MI - hypertension. Mother:   age 66 - post-op complications - thyroid, reflux - post op after hip fx (septic). Brother -  52's cirrhosis from alcoholism  Brother (twin identical) htn (he follows with physicians regularly)  Sisters x 3 - all with HTN and 1 with CABG age 71 (stent 76)                  Vitals:    22 1307   BP: 130/72   Pulse: 67   Temp: 97.8 °F (36.6 °C)   SpO2: 96%   Weight: 204 lb (92.5 kg)      Wt Readings from Last 3 Encounters:   22 204 lb (92.5 kg)   22 206 lb (93.4 kg)   21 209 lb (94.8 kg)        Physical Exam  Exam:  Const: Appears comfortable.  No signs of acute distress present. Head/Face: Atraumatic on inspection. Eyes: EOMI in both eyes. No discharge from the eyes. PERRL. Sclerae clear. ENMT: Auditory canals normal. Tympanic membranes: intact and translucent. External nose WNL. Nasal mucosa is clear. Oropharynx: No erythema or exudate. Posterior pharynx is normal.  Neck: Supple. Palpation reveals no adenopathy. No masses appreciated. No JVD. Carotids: no  bruits. Resp: Respirations are unlabored. Clear to auscultation. No rales, rhonchi or wheezes appreciated  over the lungs bilaterally. CV: Rate is regular. Rhythm is regular. No gallop or rubs. No heart murmur appreciated. Extremities: No clubbing, cyanosis, or edema. No calf inflammation or tenderness. Abdomen: Bowel sounds are normoactive. Abdomen is soft, nontender, and nondistended. No  abdominal masses. No palpable hepatosplenomegaly. Lymph: No palpable or visible regional lymphadenopathy. Musculoskeletal: no acute joint inflammation except chronic low back pain with limited range of motion. . Crepitus bilateral knees  Skin: Dry and warm with no rash. Skin normal to inspection and palpation overall. Neuro: Alert and oriented. Affect: appropriate. Upper Extremities: 5/5 bilaterally. Lower Extremities:  5/5 bilaterally. Sensation intact to light touch. Reflexes: DTR's are symmetric and 2+ bilaterally. .  Cranial Nerves: Cranial nerves grossly intact. Rectal-defers      Assessment and Plan:   Diagnosis Orders   1. Chronic renal impairment, unspecified CKD stage     2. Gastroesophageal reflux disease without esophagitis  omeprazole (PRILOSEC) 20 MG delayed release capsule    CBC with Auto Differential   3. Mixed hyperlipidemia  rosuvastatin (CRESTOR) 10 MG tablet    CK    Comprehensive Metabolic Panel    Lipid Panel    TSH   4.  Chronic obstructive pulmonary disease, unspecified COPD type (HCC)  tiotropium (SPIRIVA) 18 MCG inhalation capsule    albuterol sulfate HFA (PROVENTIL;VENTOLIN;PROAIR) 108 (90 Base) MCG/ACT inhaler   5. Vitamin D deficiency  vitamin D (ERGOCALCIFEROL) 1.25 MG (46124 UT) CAPS capsule    Vitamin D 25 Hydroxy   6. Essential hypertension  metoprolol succinate (TOPROL XL) 25 MG extended release tablet    TSH   7. Anxiety and depression  FLUoxetine (PROZAC) 20 MG capsule   8. Chronic gout without tophus, unspecified cause, unspecified site  Uric Acid   9. Type 2 diabetes mellitus with hyperglycemia, without long-term current use of insulin ([facility])  Hemoglobin A1C    Urinalysis    Microalbumin / Creatinine Urine Ratio   10. Vitamin B12 deficiency  Vitamin B12 & Folate   11. H. pylori infection  metroNIDAZOLE (FLAGYL) 500 MG tablet    clarithromycin (BIAXIN) 500 MG tablet   12. Class 2 severe obesity due to excess calories with serious comorbidity and body mass index (BMI) of 35.0 to 35.9 in adult ([facility] [address]     13. Cataract, unspecified cataract type, unspecified laterality         H. pylori infection    Counseled extensively. Differential reviewed, including serious etiologies. Risk benefits of intervention and various treatment options reviewed. He opts for triple therapy as below. He will hold Crestor while on Biaxin, risk benefits reviewed. Counseled on risk benefits of therapy in general including C. difficile, interactions, malabsorption. He has follow-up with Dr. Magi Townsend in about 2 weeks and he will plan upper GI and EGD. EGD can be test of cure. In the meantime increase omeprazole to 20 mg twice a day for 2 weeks and then go back to once a day thereafter. Start clarithromycin 500 mg twice a day and he opts for metronidazole over Amoxil 503 times a day, absolute contraindication with alcohol      Cataract    planning surgery, Dr. Wilder Cade, forms completed   H. pylori infection    Counseled extensively. Differential reviewed, including serious etiologies. Risk benefits of intervention and various treatment options reviewed. He opts for triple therapy as below.   He will hold Crestor while on Biaxin, risk benefits reviewed. Counseled on risk benefits of therapy in general including C. difficile, interactions, malabsorption. He has follow-up with Dr. Romeo Mukherjee in about 2 weeks and he will plan upper GI and EGD. EGD can be test of cure. In the meantime increase omeprazole to 20 mg twice a day for 2 weeks and then go back to once a day thereafter. Start clarithromycin 500 mg twice a day and he opts for metronidazole over Amoxil 503 times a day, absolute contraindication with alcohol      Cataract    planning surgery, Dr. Gracie Bull, forms completed      Gastro-esophageal reflux disease without esophagitis  Care Plan:  Comments : States long-term symptoms, resolved with omeprazole, 6/22 tested positive for H. pylori, see above. Differential of causes and consequences reviewed. Now on omeprazole with standard precautions including RI/electrolyte imbalance. Await Dr. Romeo Mukherjee and planning EGD and upper GI. Used to take Pepcid. Cholelithiasis  Incidentally noted on LDCT. Ultrasound March/2022 again showed several small gallstones mild fatty liver, asymptomatic now. Await Dr. Romeo Mukherjee, encouraged small frequent meals, low-fat diet, notify if symptoms    Colon cancer screening    Awaiting fit test, previously ordered Cologuard which he should consider. Defers colonoscopy         Hypertension  Care Plan:  Comments :  History of whitecoat hypertension,excellent at home. Tolerating therapy. Risk of HTN reviewed. lifestyle modification emphasized. hyper and hypotensive precautions reviewed pulse parameters also  reviewed. Declines ACE inhibitor or ARB. Other hyperlipidemia  Care Plan:  Comments : Tolerating Crestor. Dose-dependent benefits reviewed goals reviewed. Defers change in therapy. .SE's/Instructions/Risks/Benefits reviewed. If  ADR's, D/C and notify. The risks  and benefits of Vitamin D3 and Coenzyme Q-10 supplementation reviewed.   risk of hyperlipidemia reviewed lifestyle modification reviewed. Goals reviewed. Declines change in therapy. Previously on Lipitor but unaffordable. Goals reviewed     Liver disease, unspecified  Care Plan:  Comments : Counseled extensively. Differential reviewed, including serious etiologies. Likely fatty liver. Precautions reviewed. Lifestyle modification appropriate diet  and weight loss reviewed. Risks of even this reviewed. Other than basic monitoring on interested in other evaluation or treatment,  in-depth blood work, imaging or otherwise. Hepatitis C screen negative. Defers  further evaluation or treatment otherwise. LFTs now normal     Hematuria, unspecified  Care Plan:  Comments : Counseled extensively. Differential reviewed, including serious etiologies. Asymptomatic. Continue per Dr. Karthik Jones . higher risk given  tobacco use reviewed. Although I have encouraged him to follow-up with Dr. Karthik Jones but, he is not interested now. Concetta Bound His wife states several family hours also have this condition. UA now normal       Encounter for general adult medical examination with bnormal  findings  Care Plan:  Comments : Health maintenance issues discussed at length 8/11/2021 encouraged yearly  Physicals. .  Defers Shingrix. Atherosclerotic heart disease of native coronary artery without angina  pectoris  Care Plan:  Comments : Status post CABG. asx. encouraged fu w/ cardiologist, previously dr Quinten Hurst,  declines. currently off ACE inhibitor/ARB and declines at this time. Continue per cardiology. Stress test 8/21 was low risk      Type 2 diabetes mellitus with hyperglycemia  Care Plan:  Comments :  extensively. watch BS closely ambulatory. Parameters given. Call if not  in range. ER if dangerous numbers. Macro and microvascular complications  reviewed. Importance of at least yearly eye exams and daily foot exams  reviewed. Risks and benefits of insulin sensitizers reviewed and he declines  now.   Now resolved, hemoglobin A1c has remained stable 5.7-5.5-5.6 -5.4-5.7-5.8. Gout, unspecified  Care Plan:  Comments :  Continue to emphasized low uric acid diet. Proper hydration reviewed. He has colcrys prn,2×1 at onset of  symptoms and repeat one hour later. .  reviewed. Uric acid goals reviewed. Now at goal with complaints on allopurinol, currently 5.8. Continue allopurinol 300 mg daily. Risk of hyperuricemia reviewed. Chronic obstructive pulmonary disease, unspecified  Care Plan:  Comments : Asymptomatic. Of the CT 7/21 - for nodules. Defers pulmonary Function Tests, referral or otherwise. Currently on  Spiriva and p.r.n. Proair which he rarely needs although a little more in the fall.     Intervertebral disc disorders with myelopathy, lumbar region  Care Plan:   Status post injections through [facility], then surgery with  discectomy/laminectomy through Dr. Amelia Cox. Continue per them. h/o PT     Vitamin D deficiency, unspecified  Care Plan:  Comments : Stable on prescription vitamin D,, continue monitoring     Disorder of prostate, unspecified  Care Plan:  Comments : Counseled extensively. Differential reviewed, including serious etiologies. rising  PSA is trending down from 2.6-2.2-3.53-1. 81. He should follow-up with Dr. Rosina Paget, deferring now       Secondary polycythemia  Care Plan:  Comments : Likely related to smoking, other differential reviewed. Monitor. Declines further  eval/tx. currently normal    Dysthymic disorder  Care Plan:  Comments : Stable. Continue current therapy. r/b meds reviewed.        CRI-  Counseled. Proper hydration. Avoid nephrotoxic agents monitor. Declines imaging or referral creatinine stable, currently normal         Tobacco abuse-counseled extensively on importance of abstinence. States he gets chest x-ray and PFTs at work but, defers REF. LDCT 7/trauma. Cut back, thinking about stopping, declines assistance    Bilateral knee OA  Failed conservative measures.   Continue per [facility]  Topicals reviewed. R/B Tylenol reviewed. Avoidance of NSAID recommendations reviewed  Obesity  Counseled. Risk reviewed. Lifestyle modification emphasized. Counseled on Mediterranean diet/weight watchers. Declines formal invention. B12 deficiency  History of. Risk of low and high reviewed. Check level. Appropriate supplementation reviewed    Hyperkalemia  Encourage counseled counseled on possible causes and consequences. Counseled on TP versus FP . Encourage avoiding excessive intake, encourage proper hydration, avoid supplements currently normal             Plan as above. Counseled extensively and differential diagnoses relevant to above were reviewed, including serious etiologies, risks and complications, especially of left uncontrolled. If relevant, instructions and  alternatives to meds/treatment reviewed, as well as interactions, and  SE's/ADRs reviewed, notify immediately if any, discontinuing new meds if any. Plan made after discussion and shared decision making. No flowsheet data found. Counseled on all. Start triple therapy, he will reduce omeprazole back to 20 mg daily after 14 days. He will hold Crestor for the 14 days that he is on Biaxin, pros and cons reviewed. Await Dr. Paulina Strong. Continue per other specialist.  Notify of symptoms. Otherwise simply wants blood work and follow-up in 3 months sooner as needed  As long as symptoms steadily improve/resolve, and medical conditions follow the expected course, FU as below, sooner PRN. Return in about 3 months (around 9/23/2022), or if symptoms worsen or fail to improve. Educational materials and/or home exercises printed for patient's review and were included in patient instructions on his/her After Visit Summary and given to patient at the end of visit.        After discussion, patient and/or guardian verbalizes understanding, agrees, feels comfortable with and wishes to proceed with above treatment plan. Call for any pending results, FU sooner if abnormal, as needed or if any current symptoms persist/worsen. Advised patient to call with any new medication issues, and read all Rx info from pharmacy to assure aware of all possible risks and side effects of medication before taking. Reviewed age and gender appropriate health screening exams and vaccinations. Advised patient regarding importance of keeping up with recommended health maintenance and to schedule as soon as possible if overdue, as this is important in assessing for undiagnosed pathology, especially cancer, as well as protecting against potentially harmful/life threatening disease. Patient and/or guardian verbalizes understanding and agrees with above counseling, assessment and plan. All questions answered. Signs and symptoms to watch for discussed, serious signs and symptoms reviewed. ER if any. Over 40 minutes  spent with the patient in reviewing records, reviewing with patient/family, counseling, ordering,  prescribing, completing h&p, etc., with over 50% of the time spent face to face counseling. Payam Calhoun MD    Patients are advised to check with insurance company to ensure coverage and to fully understand benefits and cost prior to any testing. This note was created with the assistance of voice recognition software. Document was reviewed however may contain grammatical errors.

## 2022-07-07 ENCOUNTER — OFFICE VISIT (OUTPATIENT)
Dept: SURGERY | Age: 72
End: 2022-07-07
Payer: MEDICARE

## 2022-07-07 VITALS
RESPIRATION RATE: 18 BRPM | DIASTOLIC BLOOD PRESSURE: 88 MMHG | WEIGHT: 201 LBS | SYSTOLIC BLOOD PRESSURE: 149 MMHG | HEART RATE: 89 BPM | HEIGHT: 64 IN | OXYGEN SATURATION: 96 % | TEMPERATURE: 97.3 F | BODY MASS INDEX: 34.31 KG/M2

## 2022-07-07 DIAGNOSIS — A04.8 H. PYLORI INFECTION: Primary | ICD-10-CM

## 2022-07-07 DIAGNOSIS — Z12.11 ENCOUNTER FOR SCREENING COLONOSCOPY: ICD-10-CM

## 2022-07-07 PROCEDURE — 99203 OFFICE O/P NEW LOW 30 MIN: CPT | Performed by: SURGERY

## 2022-07-07 PROCEDURE — G8427 DOCREV CUR MEDS BY ELIG CLIN: HCPCS | Performed by: SURGERY

## 2022-07-07 PROCEDURE — 1036F TOBACCO NON-USER: CPT | Performed by: SURGERY

## 2022-07-07 PROCEDURE — G8417 CALC BMI ABV UP PARAM F/U: HCPCS | Performed by: SURGERY

## 2022-07-07 PROCEDURE — 1123F ACP DISCUSS/DSCN MKR DOCD: CPT | Performed by: SURGERY

## 2022-07-07 PROCEDURE — 3017F COLORECTAL CA SCREEN DOC REV: CPT | Performed by: SURGERY

## 2022-07-07 NOTE — PROGRESS NOTES
111 [facility] Surgery Clinic Note    Assessment/Plan:      Diagnosis Orders   1. H. pylori infection; Epigastric pain; GERD      Continue treatment. Continue PPI. We will plan for EGD evaluation. 2. Encounter for screening colonoscopy      Plan for colonoscopy            Return for Colonoscopy. Chief Complaint   Patient presents with    New Patient     gallstones       PCP: Vidal Everett MD    HPI: Kenney Escalante is a 81 y.o. male who presents in consultation for epigastric pain and finding of gallstones. He was diagnosed with H. pylori and is going through antibiotic treatment currently for that. He is also been placed on Prilosec. That has improved his epigastric pain. He does have reflux also which is much better now that he is on a PPI. He does drink 2 to 3 cups of coffee daily. He tries limit his sauces and spicy foods. He has a pack per day smoker. He did have an ultrasound showing small amount of cholelithiasis. Overall he has no current biliary type symptoms. I see last him he had a gallbladder attack was in about 2011 after he had heart surgery. He has never had upper or lower endoscopy. There are no issues moving his bowels. He denies any melena or hematochezia. He has had no abnormal weight loss. There are no bowel caliber changes. He has a niece who has Crohn's disease. There is no family history of upper or lower GI cancers      Past Medical History:   Diagnosis Date    CAD (coronary artery disease)     COPD (chronic obstructive pulmonary disease) ([facility] [address]     Hyperlipidemia     Hypertension        Past Surgical History:   Procedure Laterality Date    CORONARY ARTERY BYPASS GRAFT  06/09/11    ACB X 5-  [address] CATH LAB PROCEDURE  06/08/11    DR. VÁSQUEZ    TONSILLECTOMY AND ADENOIDECTOMY         Prior to Admission medications    Medication Sig Start Date End Date Taking?  Authorizing Provider   omeprazole (PRILOSEC) 20 MG delayed release capsule Take 1 capsule by mouth daily Ideally 1hr prior to dinner 22  Yes Marian Galarza MD   rosuvastatin (CRESTOR) 10 MG tablet Take 1 tablet by mouth daily 22  Yes Marian Galarza MD   tiotropium (SPIRIVA) 18 MCG inhalation capsule Inhale 1 capsule into the lungs daily 22  Yes Marian Galarza MD   vitamin D (ERGOCALCIFEROL) 1.25 MG (77570 UT) CAPS capsule Take 1 capsule by mouth once a week 22  Yes Marian Galarza MD   metoprolol succinate (TOPROL XL) 25 MG extended release tablet Take 0.5 tablets by mouth daily 22  Yes Marian Galarza MD   FLUoxetine (PROZAC) 20 MG capsule Take 1 capsule by mouth daily 22  Yes Marian Galarza MD   albuterol sulfate HFA (PROVENTIL;VENTOLIN;PROAIR) 108 (90 Base) MCG/ACT inhaler 1-2 puffs q4-6hrs prn 22  Yes Marian Galarza MD   allopurinol (ZYLOPRIM) 300 MG tablet Take 1 tablet by mouth daily 21  Yes Marian Galarza MD   Multiple Vitamin (MULTIVITAMIN PO) Take by mouth daily   Yes Historical Provider, MD   colchicine (COLCRYS) 0.6 MG tablet Two tablets by mouth x 1 at first sign of gout, then one tablet one hour later 21  Yes Marian Galarza MD   aspirin 81 MG EC tablet Take 81 mg by mouth daily.      Yes Historical Provider, MD       No Known Allergies    Social History     Socioeconomic History    Marital status:      Spouse name: None    Number of children: None    Years of education: None    Highest education level: None   Tobacco Use    Smoking status: Former     Packs/day: 1.00     Years: 45.00     Pack years: 45.00     Types: Cigarettes     Start date: 1970     Quit date: 2021     Years since quittin.9    Smokeless tobacco: Never   Vaping Use    Vaping Use: Never used   Substance and Sexual Activity    Alcohol use: Yes     Comment: occassionally on holidays    Drug use: No   Social History Narrative        PMH:    Problem List: 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 FH:    Father:     age 61 - MI - hypertension. Mother:     age 66 - post-op complications - thyroid, reflux - post op after hip fx (septic). Brother -  52's cirrhosis from alcoholism    Brother (twin identical) htn (he follows with physicians regularly)    Sisters x 3 - all with HTN and 1 with CABG age 71 (stent 76)    Reviewed, no changes. SH:    Marital: Legal Status: . Previously Worked as , asbestos exposure, follows with work physicians    Personal Habits: Cigarette Use: Former Cigarette Smoker - quit ., now smoking again as of . Alcohol: Denies alcohol use. Social Determinants of Health     Financial Resource Strain: Unknown    Difficulty of Paying Living Expenses: Patient refused   Food Insecurity: Unknown    Worried About Running Out of Food in the Last Year: Patient refused    Ran Out of Food in the Last Year: Patient refused       Family History   Problem Relation Age of Onset    Thyroid Disease Mother     Heart Attack Father 61    Hypertension Father     Hypertension Sister     Heart Surgery Sister     Cirrhosis Brother     Hypertension Sister     Heart Surgery Sister     Heart Surgery Sister     Hypertension Sister        Review of Systems   All other systems reviewed and are negative. Objective:  Vitals:    22 1518   BP: (!) 149/88   Pulse: 89   Resp: 18   Temp: 97.3 °F (36.3 °C)   TempSrc: Temporal   SpO2: 96%   Weight: 201 lb (91.2 kg)   Height: 5' 4\" (1.626 m)          Physical Exam  Constitutional:       General: He is not in acute distress. Appearance: He is not diaphoretic. HENT:      Head: Normocephalic and atraumatic. Eyes:      General:         Right eye: No discharge. Left eye: No discharge. Neck:      Trachea: No tracheal deviation. Cardiovascular:      Rate and Rhythm: Normal rate. Pulmonary:      Effort: Pulmonary effort is normal. No respiratory distress. Abdominal:      General: There is no distension. Palpations: Abdomen is soft. Tenderness: There is no abdominal tenderness. There is no guarding or rebound. Skin:     General: Skin is warm and dry. Neurological:      Mental Status: He is alert and oriented to person, place, and time. Gómez Nichols MD      NOTE: This report, in part or full,may have been transcribed using voice recognition software. Every effort was made to ensure accuracy; however, inadvertent computerized transcription errors may be present. Please excuse any transcriptional grammatical or spelling errors that may have escaped my editorial review.     CC: Bravo Spencer MD

## 2022-07-14 ENCOUNTER — TELEPHONE (OUTPATIENT)
Dept: SURGERY | Age: 72
End: 2022-07-14

## 2022-07-14 NOTE — TELEPHONE ENCOUNTER
Prior Authorization Form:      DEMOGRAPHICS:                     Patient Name:  Erick Tarango  Patient :  1950            Insurance:  Payor: [employer]hlnd / Plan: [place] / Product Type: *No Product type* /   Insurance ID Number:    Payor/Plan Subscr  Sex Relation Sub. Ins. ID Effective Group Num   1.  [address] 1950 Male Self [id number]                                   [address] [address]         DIAGNOSIS & PROCEDURE:                       Procedure/Operation: EGD/colonoscopy           CPT Code: 71905/88132    Diagnosis:  GERD/screening    ICD10 Code: K21.9/Z12.11    Location:  [facility]    Surgeon:  Dr Chuy Veloz INFORMATION:                          Date: 22    Time: 10:30 am             [facility]                                                       Status:  Outpatient        Special Comments:         Electronically signed by Crissy Arthur MA on 2022 at 11:09 AM

## 2022-07-14 NOTE — TELEPHONE ENCOUNTER
Mercedes Carbone is scheduled for EGD/colonoscopy with Dr Eunice Edouard on 11-09-22 at [facility] at 10:30 am. Patient was told to arrive at 9:30 am. Patient needs to be NPO after midnight the night before procedure. All surgery instructions were explained to the patient and a surgery letter was also mailed out. MA informed patient that PAT will also be calling to review pre-op instructions and medications. Patient verbalized understanding.   Electronically signed by Patrizia Linares MA on 7/14/2022 at 11:09 AM

## 2022-08-04 ENCOUNTER — TELEPHONE (OUTPATIENT)
Dept: PHARMACY | Facility: CLINIC | Age: 72
End: 2022-08-04

## 2022-08-04 NOTE — TELEPHONE ENCOUNTER
[facility] CLINICAL PHARMACY: ADHERENCE REVIEW  Identified care gap per [employer]: fills at [employer]: Statin adherence    Last Visit: 06.23.22    Patient identified as LIS = 4, therefore member has a 15% insurance - in cases where the plan's co-pays are less, the lesser applies        [address] Records claims through 07.23.22 (Prior Year [place] = PASSED; YTD Ricardo Yousif = Filled only once; Potential Fail Date: 08.10.22 ):   Rosuvastatin last filled on 03.15.22 for 90 day supply. Next refill due: 06.13.22    Per  [place] Portal:  (same as above). Lab Results   Component Value Date    CHOL 139 06/16/2022    TRIG 205 (H) 06/16/2022    HDL 36 06/16/2022    LDLCALC 62 06/16/2022     ALT   Date Value Ref Range Status   06/16/2022 22 0 - 40 U/L Final     AST   Date Value Ref Range Status   06/16/2022 28 0 - 39 U/L Final     The 10-year ASCVD risk score (Juana Dasilva, et al., 2013) is: 38.4%    Values used to calculate the score:      Age: 81 years      Sex: Male      Is Non- : No      Diabetic: Yes      Tobacco smoker: No      Systolic Blood Pressure: 559 mmHg      Is BP treated: No      HDL Cholesterol: 36 mg/dL      Total Cholesterol: 139 mg/dL     PLAN  The following are interventions that have been identified:   - Patient overdue refilling Rosuvastatin and active on home medication list.     Attempting to reach patient to review. Left message asking for return call.          Future Appointments   Date Time Provider Rosemarie Elder   9/29/2022  1:00 PM MD JOANN Chowdhury [facility]   12/1/2022  3:45 PM [facility] [address] [address]   [facility]  Phone: toll free [phone]

## 2022-09-23 DIAGNOSIS — K21.9 GASTROESOPHAGEAL REFLUX DISEASE WITHOUT ESOPHAGITIS: ICD-10-CM

## 2022-09-23 DIAGNOSIS — E78.2 MIXED HYPERLIPIDEMIA: ICD-10-CM

## 2022-09-23 DIAGNOSIS — E11.65 TYPE 2 DIABETES MELLITUS WITH HYPERGLYCEMIA, WITHOUT LONG-TERM CURRENT USE OF INSULIN (HCC): ICD-10-CM

## 2022-09-23 DIAGNOSIS — M1A.9XX0 CHRONIC GOUT WITHOUT TOPHUS, UNSPECIFIED CAUSE, UNSPECIFIED SITE: ICD-10-CM

## 2022-09-23 DIAGNOSIS — E55.9 VITAMIN D DEFICIENCY: ICD-10-CM

## 2022-09-23 DIAGNOSIS — I10 ESSENTIAL HYPERTENSION: ICD-10-CM

## 2022-09-23 DIAGNOSIS — E53.8 VITAMIN B12 DEFICIENCY: ICD-10-CM

## 2022-09-23 LAB
ALBUMIN SERPL-MCNC: 4.4 G/DL (ref 3.5–5.2)
ALP BLD-CCNC: 55 U/L (ref 40–129)
ALT SERPL-CCNC: 29 U/L (ref 0–40)
ANION GAP SERPL CALCULATED.3IONS-SCNC: 15 MMOL/L (ref 7–16)
AST SERPL-CCNC: 27 U/L (ref 0–39)
BACTERIA: ABNORMAL /HPF
BASOPHILS ABSOLUTE: 0.04 E9/L (ref 0–0.2)
BASOPHILS RELATIVE PERCENT: 0.7 % (ref 0–2)
BILIRUB SERPL-MCNC: 0.5 MG/DL (ref 0–1.2)
BILIRUBIN URINE: NEGATIVE
BLOOD, URINE: ABNORMAL
BUN BLDV-MCNC: 14 MG/DL (ref 6–23)
CALCIUM SERPL-MCNC: 10 MG/DL (ref 8.6–10.2)
CHLORIDE BLD-SCNC: 107 MMOL/L (ref 98–107)
CHOLESTEROL, TOTAL: 139 MG/DL (ref 0–199)
CLARITY: CLEAR
CO2: 24 MMOL/L (ref 22–29)
COLOR: YELLOW
CREAT SERPL-MCNC: 1.2 MG/DL (ref 0.7–1.2)
CREATININE URINE: 158 MG/DL (ref 40–278)
EOSINOPHILS ABSOLUTE: 0.31 E9/L (ref 0.05–0.5)
EOSINOPHILS RELATIVE PERCENT: 5.1 % (ref 0–6)
FOLATE: >20 NG/ML (ref 4.8–24.2)
GFR AFRICAN AMERICAN: >60
GFR NON-AFRICAN AMERICAN: 60 ML/MIN/1.73
GLUCOSE BLD-MCNC: 127 MG/DL (ref 74–99)
GLUCOSE URINE: NEGATIVE MG/DL
HBA1C MFR BLD: 6.1 % (ref 4–5.6)
HCT VFR BLD CALC: 48.4 % (ref 37–54)
HDLC SERPL-MCNC: 35 MG/DL
HEMOGLOBIN: 16.1 G/DL (ref 12.5–16.5)
IMMATURE GRANULOCYTES #: 0.02 E9/L
IMMATURE GRANULOCYTES %: 0.3 % (ref 0–5)
KETONES, URINE: NEGATIVE MG/DL
LDL CHOLESTEROL CALCULATED: 64 MG/DL (ref 0–99)
LEUKOCYTE ESTERASE, URINE: ABNORMAL
LYMPHOCYTES ABSOLUTE: 1.71 E9/L (ref 1.5–4)
LYMPHOCYTES RELATIVE PERCENT: 28 % (ref 20–42)
MCH RBC QN AUTO: 30.8 PG (ref 26–35)
MCHC RBC AUTO-ENTMCNC: 33.3 % (ref 32–34.5)
MCV RBC AUTO: 92.7 FL (ref 80–99.9)
MICROALBUMIN UR-MCNC: 12.1 MG/L
MICROALBUMIN/CREAT UR-RTO: 7.7 (ref 0–30)
MONOCYTES ABSOLUTE: 0.46 E9/L (ref 0.1–0.95)
MONOCYTES RELATIVE PERCENT: 7.5 % (ref 2–12)
NEUTROPHILS ABSOLUTE: 3.56 E9/L (ref 1.8–7.3)
NEUTROPHILS RELATIVE PERCENT: 58.4 % (ref 43–80)
NITRITE, URINE: NEGATIVE
PDW BLD-RTO: 14 FL (ref 11.5–15)
PH UA: 6 (ref 5–9)
PLATELET # BLD: 153 E9/L (ref 130–450)
PMV BLD AUTO: 10.1 FL (ref 7–12)
POTASSIUM SERPL-SCNC: 5.1 MMOL/L (ref 3.5–5)
PROTEIN UA: NEGATIVE MG/DL
RBC # BLD: 5.22 E12/L (ref 3.8–5.8)
RBC UA: ABNORMAL /HPF (ref 0–2)
SODIUM BLD-SCNC: 146 MMOL/L (ref 132–146)
SPECIFIC GRAVITY UA: 1.02 (ref 1–1.03)
TOTAL CK: 93 U/L (ref 20–200)
TOTAL PROTEIN: 7.7 G/DL (ref 6.4–8.3)
TRIGL SERPL-MCNC: 202 MG/DL (ref 0–149)
TSH SERPL DL<=0.05 MIU/L-ACNC: 1.06 UIU/ML (ref 0.27–4.2)
URIC ACID, SERUM: 6 MG/DL (ref 3.4–7)
UROBILINOGEN, URINE: 0.2 E.U./DL
VITAMIN B-12: 459 PG/ML (ref 211–946)
VLDLC SERPL CALC-MCNC: 40 MG/DL
WBC # BLD: 6.1 E9/L (ref 4.5–11.5)
WBC UA: ABNORMAL /HPF (ref 0–5)

## 2022-09-24 LAB — VITAMIN D 25-HYDROXY: 41 NG/ML (ref 30–100)

## 2022-10-04 ENCOUNTER — OFFICE VISIT (OUTPATIENT)
Dept: PRIMARY CARE CLINIC | Age: 72
End: 2022-10-04
Payer: MEDICARE

## 2022-10-04 VITALS
RESPIRATION RATE: 18 BRPM | WEIGHT: 200 LBS | HEIGHT: 64 IN | OXYGEN SATURATION: 97 % | BODY MASS INDEX: 34.15 KG/M2 | HEART RATE: 76 BPM | DIASTOLIC BLOOD PRESSURE: 70 MMHG | TEMPERATURE: 98 F | SYSTOLIC BLOOD PRESSURE: 138 MMHG

## 2022-10-04 DIAGNOSIS — R07.89 CHEST WALL PAIN: Primary | ICD-10-CM

## 2022-10-04 DIAGNOSIS — F41.9 ANXIETY AND DEPRESSION: ICD-10-CM

## 2022-10-04 DIAGNOSIS — G47.00 INSOMNIA, UNSPECIFIED TYPE: ICD-10-CM

## 2022-10-04 DIAGNOSIS — F32.A ANXIETY AND DEPRESSION: ICD-10-CM

## 2022-10-04 PROCEDURE — G8484 FLU IMMUNIZE NO ADMIN: HCPCS | Performed by: FAMILY MEDICINE

## 2022-10-04 PROCEDURE — 3017F COLORECTAL CA SCREEN DOC REV: CPT | Performed by: FAMILY MEDICINE

## 2022-10-04 PROCEDURE — G8417 CALC BMI ABV UP PARAM F/U: HCPCS | Performed by: FAMILY MEDICINE

## 2022-10-04 PROCEDURE — 1123F ACP DISCUSS/DSCN MKR DOCD: CPT | Performed by: FAMILY MEDICINE

## 2022-10-04 PROCEDURE — 1036F TOBACCO NON-USER: CPT | Performed by: FAMILY MEDICINE

## 2022-10-04 PROCEDURE — G8427 DOCREV CUR MEDS BY ELIG CLIN: HCPCS | Performed by: FAMILY MEDICINE

## 2022-10-04 PROCEDURE — 99214 OFFICE O/P EST MOD 30 MIN: CPT | Performed by: FAMILY MEDICINE

## 2022-10-04 RX ORDER — FLUOXETINE 10 MG/1
10 CAPSULE ORAL DAILY
Qty: 30 CAPSULE | Refills: 1 | Status: SHIPPED | OUTPATIENT
Start: 2022-10-04

## 2022-10-04 ASSESSMENT — PATIENT HEALTH QUESTIONNAIRE - PHQ9
8. MOVING OR SPEAKING SO SLOWLY THAT OTHER PEOPLE COULD HAVE NOTICED. OR THE OPPOSITE, BEING SO FIGETY OR RESTLESS THAT YOU HAVE BEEN MOVING AROUND A LOT MORE THAN USUAL: 0
2. FEELING DOWN, DEPRESSED OR HOPELESS: 1
9. THOUGHTS THAT YOU WOULD BE BETTER OFF DEAD, OR OF HURTING YOURSELF: 0
3. TROUBLE FALLING OR STAYING ASLEEP: 1
4. FEELING TIRED OR HAVING LITTLE ENERGY: 1
SUM OF ALL RESPONSES TO PHQ QUESTIONS 1-9: 5
6. FEELING BAD ABOUT YOURSELF - OR THAT YOU ARE A FAILURE OR HAVE LET YOURSELF OR YOUR FAMILY DOWN: 1
10. IF YOU CHECKED OFF ANY PROBLEMS, HOW DIFFICULT HAVE THESE PROBLEMS MADE IT FOR YOU TO DO YOUR WORK, TAKE CARE OF THINGS AT HOME, OR GET ALONG WITH OTHER PEOPLE: 1
SUM OF ALL RESPONSES TO PHQ9 QUESTIONS 1 & 2: 2
5. POOR APPETITE OR OVEREATING: 0
7. TROUBLE CONCENTRATING ON THINGS, SUCH AS READING THE NEWSPAPER OR WATCHING TELEVISION: 0
1. LITTLE INTEREST OR PLEASURE IN DOING THINGS: 1
SUM OF ALL RESPONSES TO PHQ QUESTIONS 1-9: 5

## 2022-10-04 ASSESSMENT — COLUMBIA-SUICIDE SEVERITY RATING SCALE - C-SSRS
6. HAVE YOU EVER DONE ANYTHING, STARTED TO DO ANYTHING, OR PREPARED TO DO ANYTHING TO END YOUR LIFE?: NO
2. HAVE YOU ACTUALLY HAD ANY THOUGHTS OF KILLING YOURSELF?: NO
1. WITHIN THE PAST MONTH, HAVE YOU WISHED YOU WERE DEAD OR WISHED YOU COULD GO TO SLEEP AND NOT WAKE UP?: NO
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
## 2022-10-24 ENCOUNTER — OFFICE VISIT (OUTPATIENT)
Dept: PRIMARY CARE CLINIC | Age: 72
End: 2022-10-24
Payer: MEDICARE

## 2022-10-24 VITALS
WEIGHT: 204 LBS | BODY MASS INDEX: 35.02 KG/M2 | TEMPERATURE: 97.8 F | DIASTOLIC BLOOD PRESSURE: 78 MMHG | HEART RATE: 71 BPM | SYSTOLIC BLOOD PRESSURE: 136 MMHG | OXYGEN SATURATION: 96 %

## 2022-10-24 DIAGNOSIS — I10 ESSENTIAL HYPERTENSION: ICD-10-CM

## 2022-10-24 DIAGNOSIS — E87.5 HYPERKALEMIA: ICD-10-CM

## 2022-10-24 DIAGNOSIS — G47.00 INSOMNIA, UNSPECIFIED TYPE: ICD-10-CM

## 2022-10-24 DIAGNOSIS — E11.65 TYPE 2 DIABETES MELLITUS WITH HYPERGLYCEMIA, WITHOUT LONG-TERM CURRENT USE OF INSULIN (HCC): ICD-10-CM

## 2022-10-24 DIAGNOSIS — N18.9 CHRONIC RENAL IMPAIRMENT, UNSPECIFIED CKD STAGE: ICD-10-CM

## 2022-10-24 DIAGNOSIS — M1A.9XX0 CHRONIC GOUT WITHOUT TOPHUS, UNSPECIFIED CAUSE, UNSPECIFIED SITE: ICD-10-CM

## 2022-10-24 DIAGNOSIS — F32.A ANXIETY AND DEPRESSION: ICD-10-CM

## 2022-10-24 DIAGNOSIS — F41.9 ANXIETY AND DEPRESSION: ICD-10-CM

## 2022-10-24 DIAGNOSIS — E53.8 VITAMIN B12 DEFICIENCY: ICD-10-CM

## 2022-10-24 DIAGNOSIS — E55.9 VITAMIN D DEFICIENCY: ICD-10-CM

## 2022-10-24 DIAGNOSIS — K21.9 GASTROESOPHAGEAL REFLUX DISEASE WITHOUT ESOPHAGITIS: ICD-10-CM

## 2022-10-24 DIAGNOSIS — J44.9 CHRONIC OBSTRUCTIVE PULMONARY DISEASE, UNSPECIFIED COPD TYPE (HCC): ICD-10-CM

## 2022-10-24 DIAGNOSIS — A04.8 H. PYLORI INFECTION: ICD-10-CM

## 2022-10-24 DIAGNOSIS — E78.2 MIXED HYPERLIPIDEMIA: ICD-10-CM

## 2022-10-24 DIAGNOSIS — Z72.0 TOBACCO USE: ICD-10-CM

## 2022-10-24 DIAGNOSIS — R07.89 CHEST WALL PAIN: Primary | ICD-10-CM

## 2022-10-24 PROCEDURE — 3044F HG A1C LEVEL LT 7.0%: CPT | Performed by: FAMILY MEDICINE

## 2022-10-24 PROCEDURE — 3023F SPIROM DOC REV: CPT | Performed by: FAMILY MEDICINE

## 2022-10-24 PROCEDURE — 3017F COLORECTAL CA SCREEN DOC REV: CPT | Performed by: FAMILY MEDICINE

## 2022-10-24 PROCEDURE — 99215 OFFICE O/P EST HI 40 MIN: CPT | Performed by: FAMILY MEDICINE

## 2022-10-24 PROCEDURE — 1036F TOBACCO NON-USER: CPT | Performed by: FAMILY MEDICINE

## 2022-10-24 PROCEDURE — G8484 FLU IMMUNIZE NO ADMIN: HCPCS | Performed by: FAMILY MEDICINE

## 2022-10-24 PROCEDURE — 1123F ACP DISCUSS/DSCN MKR DOCD: CPT | Performed by: FAMILY MEDICINE

## 2022-10-24 PROCEDURE — G8417 CALC BMI ABV UP PARAM F/U: HCPCS | Performed by: FAMILY MEDICINE

## 2022-10-24 PROCEDURE — G8427 DOCREV CUR MEDS BY ELIG CLIN: HCPCS | Performed by: FAMILY MEDICINE

## 2022-10-24 PROCEDURE — 2022F DILAT RTA XM EVC RTNOPTHY: CPT | Performed by: FAMILY MEDICINE

## 2022-10-24 SDOH — ECONOMIC STABILITY: FOOD INSECURITY: WITHIN THE PAST 12 MONTHS, THE FOOD YOU BOUGHT JUST DIDN'T LAST AND YOU DIDN'T HAVE MONEY TO GET MORE.: NEVER TRUE

## 2022-10-24 SDOH — ECONOMIC STABILITY: FOOD INSECURITY: WITHIN THE PAST 12 MONTHS, YOU WORRIED THAT YOUR FOOD WOULD RUN OUT BEFORE YOU GOT MONEY TO BUY MORE.: NEVER TRUE

## 2022-10-24 ASSESSMENT — SOCIAL DETERMINANTS OF HEALTH (SDOH): HOW HARD IS IT FOR YOU TO PAY FOR THE VERY BASICS LIKE FOOD, HOUSING, MEDICAL CARE, AND HEATING?: NOT HARD AT ALL

## 2022-10-24 NOTE — PROGRESS NOTES
Marty Galicia : 1950 Sex: male  Age: 81 y.o. Chief Complaint   Patient presents with    Chest Pain     Follow up feeling better     Discuss Labs    Medication Check     Increased fluoxetine          HPI:      Patient presents today with his wife for follow-up. Overall feeling much better. Chest x-ray showed postoperative changes otherwise negative. Anxiety improved. Did not contact Dr. HUDSON [facility]. Sleeping better. Sees Dr. Aguilar Sit and has EGD/colonoscopy scheduled for next month he states. Abdominal symptoms resolved after triple therapy. We did review list of care gaps he is not interested in any vaccines at this time, then did agree to flu vaccine but left prior. He may come back for this. Defers AAA screening LDCT until perhaps next visit.   Due for annual wellness visit        Blood work reviewed, potassium 5.1, simply wants rechecked in 3 months, has fluctuated minimally, glucose 127 with a hemoglobin A1c of 6.1, defers meds, simply wants rechecked in 3 months, CK 93 HDL 35 LDL 64 triglyceride 202 TSH 1.060 vitamin D 41 B12 459 CBC with differential normal folic acid greater than 20 trace blood in urine, he states chronic and follows with urology      Most Recent Labs  CBC  Lab Results   Component Value Date/Time    WBC 6.1 2022 12:11 PM    WBC 6.4 2022 11:23 AM    WBC 6.1 2022 12:28 PM    RBC 5.22 2022 12:11 PM    RBC 5.27 2022 11:23 AM    RBC 5.25 2022 12:28 PM    HGB 16.1 2022 12:11 PM    HGB 15.6 2022 11:23 AM    HGB 15.3 2022 12:28 PM    HCT 48.4 2022 12:11 PM    HCT 47.5 2022 11:23 AM    HCT 47.7 2022 12:28 PM    MCV 92.7 2022 12:11 PM    MCV 90.1 2022 11:23 AM    MCV 90.9 2022 12:28 PM     2022 12:11 PM     2022 11:23 AM     2022 12:28 PM      CMP  Lab Results   Component Value Date/Time     2022 12:11 PM     2022 11:23 AM     03/17/2022 12:28 PM    K 5.1 09/23/2022 12:11 PM    K 4.5 06/16/2022 11:23 AM    K 5.1 03/24/2022 02:13 PM     09/23/2022 12:11 PM     06/16/2022 11:23 AM     03/17/2022 12:28 PM    CO2 24 09/23/2022 12:11 PM    CO2 22 06/16/2022 11:23 AM    CO2 26 03/17/2022 12:28 PM    ANIONGAP 15 09/23/2022 12:11 PM    ANIONGAP 18 06/16/2022 11:23 AM    ANIONGAP 11 03/17/2022 12:28 PM    GLUCOSE 127 09/23/2022 12:11 PM    GLUCOSE 118 06/16/2022 11:23 AM    GLUCOSE 105 03/17/2022 12:28 PM    GLUCOSE 107 04/10/2012 11:24 AM    GLUCOSE 105 11/29/2011 10:30 AM    GLUCOSE 102 11/03/2011 10:45 AM    BUN 14 09/23/2022 12:11 PM    BUN 13 06/16/2022 11:23 AM    BUN 15 03/17/2022 12:28 PM    CREATININE 1.2 09/23/2022 12:11 PM    CREATININE 1.1 06/16/2022 11:23 AM    CREATININE 1.2 03/17/2022 12:28 PM    LABGLOM 60 09/23/2022 12:11 PM    LABGLOM >60 06/16/2022 11:23 AM    LABGLOM 60 03/17/2022 12:28 PM    GFRAA >60 09/23/2022 12:11 PM    GFRAA >60 06/16/2022 11:23 AM    GFRAA >60 03/17/2022 12:28 PM    CALCIUM 10.0 09/23/2022 12:11 PM    CALCIUM 9.8 06/16/2022 11:23 AM    CALCIUM 9.8 03/17/2022 12:28 PM    PROT 7.7 09/23/2022 12:11 PM    PROT 7.5 06/16/2022 11:23 AM    PROT 7.2 03/17/2022 12:28 PM    LABALBU 4.4 09/23/2022 12:11 PM    LABALBU 4.5 06/16/2022 11:23 AM    LABALBU 4.3 03/17/2022 12:28 PM    LABALBU 4.5 04/10/2012 11:24 AM    LABALBU 4.4 11/29/2011 10:30 AM    LABALBU 4.8 08/18/2011 01:15 PM    BILITOT 0.5 09/23/2022 12:11 PM    BILITOT 0.6 06/16/2022 11:23 AM    BILITOT 0.6 03/17/2022 12:28 PM    ALKPHOS 55 09/23/2022 12:11 PM    ALKPHOS 53 06/16/2022 11:23 AM    ALKPHOS 51 03/17/2022 12:28 PM    AST 27 09/23/2022 12:11 PM    AST 28 06/16/2022 11:23 AM    AST 29 03/17/2022 12:28 PM    ALT 29 09/23/2022 12:11 PM    ALT 22 06/16/2022 11:23 AM    ALT 26 03/17/2022 12:28 PM     A1C  Lab Results   Component Value Date/Time    LABA1C 6.1 09/23/2022 12:11 PM    LABA1C 5.8 06/16/2022 11:23 AM    LABA1C 5.7 03/17/2022 12:28 PM     TSH  Lab Results   Component Value Date/Time    TSH 1.060 09/23/2022 12:11 PM    TSH 1.900 06/16/2022 11:23 AM    TSH 1.440 03/17/2022 12:28 PM     FREET4  No results found for: [id number]  LIPID  Lab Results   Component Value Date/Time    CHOL 139 09/23/2022 12:11 PM    CHOL 139 06/16/2022 11:23 AM    CHOL 123 03/17/2022 12:28 PM    HDL 35 09/23/2022 12:11 PM    HDL 36 06/16/2022 11:23 AM    HDL 30 03/17/2022 12:28 PM    LDLCALC 64 09/23/2022 12:11 PM    LDLCALC 62 06/16/2022 11:23 AM    LDLCALC 66 03/17/2022 12:28 PM    TRIG 202 09/23/2022 12:11 PM    TRIG 205 06/16/2022 11:23 AM    TRIG 136 03/17/2022 12:28 PM     VITAMIN D  Lab Results   Component Value Date/Time    VITD25 41 09/23/2022 12:11 PM    VITD25 42 06/16/2022 11:23 AM    VITD25 36 03/17/2022 12:28 PM     MAGNESIUM  Lab Results   Component Value Date/Time    MG 2.6 06/20/2011 03:18 AM    MG 2.3 06/11/2011 05:15 AM    MG 2.3 06/10/2011 04:10 AM      PHOS  Lab Results   Component Value Date/Time    PHOS 2.5 06/11/2011 05:15 AM    PHOS 4.1 06/10/2011 04:10 AM    PHOS 4.2 06/09/2011 03:10 AM      MARIA GUADALUPE   Lab Results   Component Value Date/Time    MARIA GUADALUPE NEGATIVE 08/30/2015 03:50 PM    MARIA GUADALUPE NEGATIVE 08/18/2011 01:15 PM    MARIA GUADALUPE NEGATIVE 06/06/2011 10:30 AM     RHEUMATOID FACTOR  Lab Results   Component Value Date/Time    RF <10 08/30/2015 03:50 PM    RF <4 08/18/2011 01:15 PM    RF <4 06/06/2011 10:30 AM     PSA  Lab Results   Component Value Date/Time    PSA 1.81 07/02/2021 10:57 AM    PSA 3.53 07/20/2020 12:08 PM    PSA 2.23 04/25/2019 09:22 AM      HEPATITIS C  Lab Results   Component Value Date/Time    HCVABI Non-Reactive 06/11/2018 08:46 AM     HIV  No results found for: SHF2GYL, HIV1QT  UA  Lab Results   Component Value Date/Time    COLORU Yellow 09/23/2022 12:10 PM    COLORU Yellow 06/16/2022 11:24 AM    COLORU Yellow 03/17/2022 12:28 PM    CLARITYU Clear 09/23/2022 12:10 PM    CLARITYU Clear 06/16/2022 11:24 AM    CLARITYU Clear 03/17/2022 12:28 PM GLUCOSEU Negative 09/23/2022 12:10 PM    GLUCOSEU Negative 06/16/2022 11:24 AM    GLUCOSEU Negative 03/17/2022 12:28 PM    GLUCOSEU NEGATIVE 08/18/2011 01:05 PM    GLUCOSEU NEGATIVE 06/08/2011 05:30 PM    GLUCOSEU NEGATIVE 04/25/2011 11:30 AM    BILIRUBINUR Negative 09/23/2022 12:10 PM    BILIRUBINUR Negative 06/16/2022 11:24 AM    BILIRUBINUR Negative 03/17/2022 12:28 PM    BILIRUBINUR NEGATIVE 08/18/2011 01:05 PM    BILIRUBINUR NEGATIVE 06/08/2011 05:30 PM    BILIRUBINUR NEGATIVE 04/25/2011 11:30 AM    KETUA Negative 09/23/2022 12:10 PM    KETUA Negative 06/16/2022 11:24 AM    KETUA Negative 03/17/2022 12:28 PM    SPECGRAV 1.025 09/23/2022 12:10 PM    SPECGRAV 1.025 06/16/2022 11:24 AM    SPECGRAV 1.025 03/17/2022 12:28 PM    BLOODU TRACE-INTACT 09/23/2022 12:10 PM    BLOODU Negative 06/16/2022 11:24 AM    BLOODU Negative 03/17/2022 12:28 PM    PHUR 6.0 09/23/2022 12:10 PM    PHUR 5.5 06/16/2022 11:24 AM    PHUR 5.0 03/17/2022 12:28 PM    PROTEINU Negative 09/23/2022 12:10 PM    PROTEINU Negative 06/16/2022 11:24 AM    PROTEINU Negative 03/17/2022 12:28 PM    UROBILINOGEN 0.2 09/23/2022 12:10 PM    UROBILINOGEN 0.2 06/16/2022 11:24 AM    UROBILINOGEN 0.2 03/17/2022 12:28 PM    NITRU Negative 09/23/2022 12:10 PM    NITRU Negative 06/16/2022 11:24 AM    NITRU Negative 03/17/2022 12:28 PM    LEUKOCYTESUR TRACE 09/23/2022 12:10 PM    LEUKOCYTESUR Negative 06/16/2022 11:24 AM    LEUKOCYTESUR Negative 03/17/2022 12:28 PM     Urine Micro/Albumin Ratio  Lab Results   Component Value Date/Time    MALBCR 7.7 09/23/2022 12:10 PM    MALBCR 10.1 06/16/2022 11:24 AM    MALBCR 9.6 03/17/2022 12:27 PM       ROS:  Const: Denies chills, fever, malaise and sweats. Eyes: Denies discharge, pain, redness and, chronic left visual disturbance  ENMT: Denies earaches, other ear symptoms. Denies nasal or sinus symptoms other than stated  above. Denies mouth and tongue lesions and sore throat.   CV: Denies chest discomfort, pain; diaphoresis, dizziness, edema, lightheadedness, orthopnea,  palpitations, syncope and near syncopal episode or any exertional symptoms  Resp: Denies cough, hemoptysis, pleuritic pain, SOB, sputum production and wheezing. GI: Denies abdominal pain, reflux symptoms change in bowel habits, hematochezia, melena, nausea and vomiting. : Denies urinary symptoms including dysuria , urgency, frequency or hematuria. Musculo: Chronic low back pain with limited range of motion, chronic knee pain. OA. Skin: Denies bruising and rash.   Neuro: Denies headache, numbness, stiff neck, tingling and focal weakness slurred speech or facial  droop  Hema/Lymph: Denies bleeding/bruising tendency and enlarged lymph nodes        Current Outpatient Medications:     FLUoxetine (PROZAC) 10 MG capsule, Take 1 capsule by mouth daily (In addition to the 20mg daily for 30mg daily total), Disp: 30 capsule, Rfl: 1    omeprazole (PRILOSEC) 20 MG delayed release capsule, Take 1 capsule by mouth daily Ideally 1hr prior to dinner, Disp: 90 capsule, Rfl: 3    rosuvastatin (CRESTOR) 10 MG tablet, Take 1 tablet by mouth daily, Disp: 90 tablet, Rfl: 3    tiotropium (SPIRIVA) 18 MCG inhalation capsule, Inhale 1 capsule into the lungs daily, Disp: 90 capsule, Rfl: 3    vitamin D (ERGOCALCIFEROL) 1.25 MG (01216 UT) CAPS capsule, Take 1 capsule by mouth once a week, Disp: 12 capsule, Rfl: 3    metoprolol succinate (TOPROL XL) 25 MG extended release tablet, Take 0.5 tablets by mouth daily, Disp: 45 tablet, Rfl: 3    FLUoxetine (PROZAC) 20 MG capsule, Take 1 capsule by mouth daily, Disp: 90 capsule, Rfl: 3    albuterol sulfate HFA (PROVENTIL;VENTOLIN;PROAIR) 108 (90 Base) MCG/ACT inhaler, 1-2 puffs q4-6hrs prn, Disp: 3 each, Rfl: 3    allopurinol (ZYLOPRIM) 300 MG tablet, Take 1 tablet by mouth daily, Disp: 90 tablet, Rfl: 3    Multiple Vitamin (MULTIVITAMIN PO), Take by mouth daily, Disp: , Rfl:     colchicine (COLCRYS) 0.6 MG tablet, Two tablets by mouth x 1 at first sign of gout, then one tablet one hour later, Disp: 30 tablet, Rfl: 0    aspirin 81 MG EC tablet, Take 81 mg by mouth daily. , Disp: , Rfl:   No Known Allergies    Past Medical History:   Diagnosis Date    CAD (coronary artery disease)     COPD (chronic obstructive pulmonary disease) ([facility] [address]     Hyperlipidemia     Hypertension      Past Surgical History:   Procedure Laterality Date    CORONARY ARTERY BYPASS GRAFT  11    ACB X 5-  [address] CATH LAB PROCEDURE  11    DR. VÁSQUEZ    TONSILLECTOMY AND ADENOIDECTOMY       Family History   Problem Relation Age of Onset    Thyroid Disease Mother     Heart Attack Father 61    Hypertension Father     Hypertension Sister     Heart Surgery Sister     Cirrhosis Brother     Hypertension Sister     Heart Surgery Sister     Heart Surgery Sister     Hypertension Sister      Social History     Tobacco Use    Smoking status: Former     Packs/day: 1.00     Years: 45.00     Pack years: 45.00     Types: Cigarettes     Start date: 1970     Quit date: 2021     Years since quittin.1    Smokeless tobacco: Never   Vaping Use    Vaping Use: Never used   Substance Use Topics    Alcohol use: Yes     Comment: occassionally on holidays    Drug use: No      Social History     Social History Narrative        PMH:    Problem List: 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 FH:    Father:     age 61 - MI - hypertension. Mother:     age 66 - post-op complications - thyroid, reflux - post op after hip fx (septic).     Brother -  52's cirrhosis from alcoholism    Brother (twin identical) htn (he follows with physicians regularly)    Sisters x 3 - all with HTN and 1 with CABG age 71 (stent 76)    Reviewed, no changes. SH:    Marital: Legal Status: . Previously Worked as , asbestos exposure, follows with work physicians    Personal Habits: Cigarette Use: Former Cigarette Smoker - quit ., now smoking again as of . Alcohol: Denies alcohol use. FH:  Father:   age 61 - MI - hypertension. Mother:   age 66 - post-op complications - thyroid, reflux - post op after hip fx (septic). Brother -  52's cirrhosis from alcoholism  Brother (twin identical) htn (he follows with physicians regularly)  Sisters x 3 - all with HTN and 1 with CABG age 71 (stent 76)                  Vitals:    10/24/22 1012   BP: 136/78   Pulse: 71   Temp: 97.8 °F (36.6 °C)   SpO2: 96%   Weight: 204 lb (92.5 kg)      Wt Readings from Last 3 Encounters:   10/24/22 204 lb (92.5 kg)   10/04/22 200 lb (90.7 kg)   22 201 lb (91.2 kg)        Physical Exam  Exam:  Const: Appears comfortable. No signs of acute distress present. Head/Face: Atraumatic on inspection. Eyes: EOMI in both eyes. No discharge from the eyes. PERRL. Sclerae clear. ENMT: Auditory canals normal. Tympanic membranes: intact and translucent. External nose WNL. Nasal mucosa is clear. Oropharynx: No erythema or exudate. Posterior pharynx is normal.  Neck: Supple. Palpation reveals no adenopathy. No masses appreciated. No JVD. Carotids: no  bruits. Resp: Respirations are unlabored. Clear to auscultation. No rales, rhonchi or wheezes appreciated  over the lungs bilaterally. CV: Rate is regular. Rhythm is regular. No gallop or rubs. No heart murmur appreciated. Extremities: No clubbing, cyanosis, or edema. No calf inflammation or tenderness. Abdomen: Bowel sounds are normoactive. Abdomen is soft, nontender, and nondistended. No  abdominal masses. No palpable hepatosplenomegaly.   Lymph: No palpable or visible regional lymphadenopathy. Musculoskeletal: no acute joint inflammation except chronic low back pain with limited range of motion. . Crepitus bilateral knees  Skin: Dry and warm with no rash. Skin normal to inspection and palpation overall. Neuro: Alert and oriented. Affect: appropriate. Upper Extremities: 5/5 bilaterally. Lower Extremities:  5/5 bilaterally. Sensation intact to light touch. Reflexes: DTR's are symmetric and 2+ bilaterally. .  Cranial Nerves: Cranial nerves grossly intact. Genital/rectal-defers      Assessment and Plan:   Diagnosis Orders   1. Chest wall pain        2. Anxiety and depression        3. Insomnia, unspecified type        4. Gastroesophageal reflux disease without esophagitis  CBC with Auto Differential      5. Mixed hyperlipidemia  CK    Lipid Panel      6. Chronic obstructive pulmonary disease, unspecified COPD type ([facility] [address]        7. Vitamin D deficiency  Vitamin D 25 Hydroxy      8. Essential hypertension        9. Chronic gout without tophus, unspecified cause, unspecified site  Uric Acid      10. Chronic renal impairment, unspecified CKD stage        11. Type 2 diabetes mellitus with hyperglycemia, without long-term current use of insulin ([facility])  Comprehensive Metabolic Panel    Hemoglobin A1C    Urinalysis    Microalbumin / Creatinine Urine Ratio    TSH      12. Vitamin B12 deficiency  Vitamin B12 & Folate      13. H. pylori infection        14. Hyperkalemia        15. Tobacco use            No problem-specific Assessment & Plan notes found for this encounter. Chest wall pain  Counseled extensively. Differential reviewed, including serious etiologies. Most consistent with costochondritis, x-ray overall negative. Symptoms resolved      Anxiety and depression  With increased family stressors. Last time we referred to Dr. Claudette Moore and I encouraged him to call, he has not yet. They are going to start counseling as well and to have a counselor. Adamantly denies SI/HI.   He is feeling much better on higher dose fluoxetine with  standard precautions reviewed including prolonged QT SIADH paradoxical reaction etc. now on 30 mg daily. Insomnia, unspecified type  Counseled, benefiting from melatonin, risk benefits reviewed, has benefited from increased fluoxetine as above. H. pylori infection    Counseled extensively. Differential reviewed, including serious etiologies. symptoms resolved after triple therapy. He is following with Dr. John Francis and planning EGD/colonoscopy next month        Gastro-esophageal reflux disease without esophagitis  Care Plan:  Comments : States long-term symptoms, resolved with omeprazole, 6/22 tested positive for H. pylori, since treated and feeling better. Differential of causes and consequences reviewed. Now on omeprazole with standard precautions including RI/electrolyte imbalance. Following with Dr. John Francis and plan EGD/colonoscopy next month used to take Pepcid. Cholelithiasis  Incidentally noted on LDCT. Ultrasound March/2022 again showed several small gallstones mild fatty liver, asymptomatic now. Await Dr. John Francis, encouraged small frequent meals, low-fat diet, notify if symptoms    Colon cancer screening    Awaiting fit test, planning: Skippy next month       Hypertension  Care Plan:  Comments :  History of whitecoat hypertension,excellent at home. Tolerating therapy. Risk of HTN reviewed. lifestyle modification emphasized. hyper and hypotensive precautions reviewed pulse parameters also  reviewed. Declines ACE inhibitor or ARB. Other hyperlipidemia  Care Plan:  Comments : Tolerating Crestor. Dose-dependent benefits reviewed goals reviewed. Defers change in therapy. .SE's/Instructions/Risks/Benefits reviewed. If  ADR's, D/C and notify. The risks  and benefits of Vitamin D3 and Coenzyme Q-10 supplementation reviewed. risk of hyperlipidemia reviewed lifestyle modification reviewed. Goals reviewed.   Declines change in therapy. Previously on Lipitor but unaffordable. Goals reviewed     Liver disease, unspecified  Care Plan:  Comments : Counseled extensively. Differential reviewed, including serious etiologies. Likely fatty liver. Precautions reviewed. Lifestyle modification appropriate diet  and weight loss reviewed. Risks of even this reviewed. Other than basic monitoring on interested in other evaluation or treatment,  in-depth blood work, imaging or otherwise. Hepatitis C screen negative. Defers  further evaluation or treatment otherwise. LFTs now normal     Hematuria, unspecified  Care Plan:  Comments : Counseled extensively. Differential reviewed, including serious etiologies. Asymptomatic. Continue per Dr. Mana Muñoz . higher risk given  tobacco use reviewed. Although I have encouraged him to follow-up with Dr. Mana Muñoz but, he is not interested now. Dolly Thomason His wife states several family hours also have this condition. Encounter for general adult medical examination with bnormal  findings  Care Plan:  Comments : Health maintenance issues discussed at length 8/11/2021 encouraged yearly  Physicals. .  See above, defers Shingrix, he is planning flu vaccine left before administering, he may return, defers AAA screen and LDCT      Atherosclerotic heart disease of native coronary artery without angina  pectoris  Care Plan:  Comments : Status post CABG. asx. encouraged fu w/ cardiologist, previously dr Awais Wayne,  declines. currently off ACE inhibitor/ARB and declines at this time. Continue per cardiology. Stress test 8/21 was low risk      Type 2 diabetes mellitus with hyperglycemia  Care Plan:  Comments :  extensively. watch BS closely ambulatory. Parameters given. Call if not  in range. ER if dangerous numbers. Macro and microvascular complications  reviewed. Importance of at least yearly eye exams and daily foot exams  reviewed. Risks and benefits of insulin sensitizers reviewed and he declines  now. hemoglobin A1c has remained relatively stable 5.7-5.5-5.6 -5.4-5.7-5.8-6.1. Gout, unspecified  Care Plan:  Comments :  Continue to emphasized low uric acid diet. Proper hydration reviewed. He has colcrys prn,2×1 at onset of  symptoms and repeat one hour later. .  reviewed. Uric acid goals reviewed. Now at goal with complaints on allopurinol, currently 5.8-6.0. Continue allopurinol 300 mg daily. Risk of hyperuricemia reviewed. Chronic obstructive pulmonary disease, unspecified  Care Plan:  Comments : Asymptomatic. Of the CT 7/21 - for nodules. Defers pulmonary Function Tests, referral or otherwise. Currently on  Spiriva and p.r.n. Proair which he rarely needs although a little more in the fall. Defers LDCT now     Intervertebral disc disorders with myelopathy, lumbar region  Care Plan:   Status post injections through all points, then surgery with  discectomy/laminectomy through Dr. Rabia Nguyen. Continue per them. h/o PT     Vitamin D deficiency, unspecified  Care Plan:  Comments : Stable on prescription vitamin D,, continue monitoring     Disorder of prostate, unspecified  Care Plan:  Comments : Counseled extensively. Differential reviewed, including serious etiologies. rising  PSA is trending down from 2.6-2.2-3.53-1. 81. He should follow-up with Dr. Evelina Burch, deferring now       Secondary polycythemia  Care Plan:  Comments : Likely related to smoking, other differential reviewed. Monitor. Declines further  eval/tx. currently normal    Dysthymic disorder  Care Plan:  Comments : Stable. Continue current therapy. r/b meds reviewed. CRI-  Counseled. Proper hydration. Avoid nephrotoxic agents monitor. Declines imaging or referral creatinine stable, currently normal         Tobacco abuse-counseled extensively on importance of abstinence. States he gets chest x-ray and PFTs at work but, defers REF.  defers LDCT this year cut back, thinking about stopping, declines assistance    Bilateral knee OA  Failed conservative measures. Continue per [facility]  Topicals reviewed. R/B Tylenol reviewed. Avoidance of NSAID recommendations reviewed    Obesity  Counseled. Risk reviewed. Lifestyle modification emphasized. Counseled on Mediterranean diet/weight watchers. Declines formal invention. B12 deficiency  History of. Risk of low and high reviewed. Check level. Appropriate supplementation reviewed    Hyperkalemia  Mild, fluctuates. counseled on possible causes and consequences. Counseled on TP versus FP . Encourage avoiding excessive intake, encourage proper hydration, avoid supplements. Simply wants rechecked in 3 months. Plan as above. Counseled extensively and differential diagnoses relevant to above were reviewed, including serious etiologies, risks and complications, especially of left uncontrolled. If relevant, instructions and  alternatives to meds/treatment reviewed, as well as interactions, and  SE's/ADRs reviewed, notify immediately if any, discontinuing new meds if any. Plan made after discussion and shared decision making. No flowsheet data found. Counseled on all. Awaiting Dr. Pa Chowdary. He is following with Dr. John Francis. Continue per other specialist.  Otherwise simply wants blood work and follow-up in 3 months sooner as needed. Due for annual wellness visit and he should schedule for this. Defers LDCT and AAA screen at this time      As long as symptoms steadily improve/resolve, and medical conditions follow the expected course, FU as below, sooner PRN. Return in about 3 months (around 1/24/2023), or if symptoms worsen or fail to improve. Educational materials and/or home exercises printed for patient's review and were included in patient instructions on his/her After Visit Summary and given to patient at the end of visit.        After discussion, patient and/or guardian verbalizes understanding, agrees, feels comfortable with and wishes to proceed with above treatment plan. Call for any pending results, FU sooner if abnormal, as needed or if any current symptoms persist/worsen. Advised patient to call with any new medication issues, and read all Rx info from pharmacy to assure aware of all possible risks and side effects of medication before taking. Reviewed age and gender appropriate health screening exams and vaccinations. Advised patient regarding importance of keeping up with recommended health maintenance and to schedule as soon as possible if overdue, as this is important in assessing for undiagnosed pathology, especially cancer, as well as protecting against potentially harmful/life threatening disease. Patient and/or guardian verbalizes understanding and agrees with above counseling, assessment and plan. All questions answered. Signs and symptoms to watch for discussed, serious signs and symptoms reviewed. ER if any. Over 40 minutes  spent with the patient in reviewing records, reviewing with patient/family, counseling, ordering,  prescribing, completing h&p, etc., with over 50% of the time spent face to face counseling. Kellie Marshall MD    Patients are advised to check with insurance company to ensure coverage and to fully understand benefits and cost prior to any testing. This note was created with the assistance of voice recognition software. Document was reviewed however may contain grammatical errors.

## 2022-11-03 ENCOUNTER — TELEPHONE (OUTPATIENT)
Dept: SURGERY | Age: 72
End: 2022-11-03

## 2022-11-03 NOTE — TELEPHONE ENCOUNTER
Received a voice message from PAT who stated that the pt wants to cancel the EGD and colonoscopy on 11/9/22. Forwarded message to [employer] [facility] for informational purposes.   Electronically signed by Lisandro Alcantara on 11/3/2022 at 3:28 PM

## 2022-11-04 ENCOUNTER — TELEPHONE (OUTPATIENT)
Dept: PHARMACY | Facility: CLINIC | Age: 72
End: 2022-11-04

## 2022-11-04 ENCOUNTER — CLINICAL DOCUMENTATION (OUTPATIENT)
Dept: SURGERY | Age: 72
End: 2022-11-04

## 2022-11-04 NOTE — TELEPHONE ENCOUNTER
[facility] CLINICAL PHARMACY: ADHERENCE REVIEW  Identified care gap per [employer]: fills at [facility] : Statin adherence    Last Visit: 10/24/22    Patient identified as LIS = 4, therefore member has a 15% insurance - in cases where the plan's co-pays are less, the lesser applies        [address] Records claims through 10.21.22 (Prior Year [place] = PASSED; YTD Ricardo Yousif =  74%; Potential Fail Date: 11.08.22 ):   Rosuvastatin last filled on 08.08.22 for 90 day supply. Next refill due: 11.06.22    Per  [employer] Portal:  (same as above). Lab Results   Component Value Date    CHOL 139 09/23/2022    TRIG 202 (H) 09/23/2022    HDL 35 09/23/2022    LDLCALC 64 09/23/2022     ALT   Date Value Ref Range Status   09/23/2022 29 0 - 40 U/L Final     AST   Date Value Ref Range Status   09/23/2022 27 0 - 39 U/L Final     The 10-year ASCVD risk score (Matt MONTAGUE, et al., 2019) is: 34.1%    Values used to calculate the score:      Age: 81 years      Sex: Male      Is Non- : No      Diabetic: Yes      Tobacco smoker: No      Systolic Blood Pressure: 852 mmHg      Is BP treated: No      HDL Cholesterol: 35 mg/dL      Total Cholesterol: 139 mg/dL     PLAN  The following are interventions that have been identified:   - Patient overdue refilling Rosuvastatin and active on home medication list.     Left message reminding patient to  refill of Rosuvastatin from [facility]. No future appointments.     Rafael Saeed, [address]  Phone: toll free [phone]        ===================================================================    For Pharmacy Admin Tracking Only    Gap Closed?: No   Time Spent (min): 15

## 2022-12-01 ENCOUNTER — OFFICE VISIT (OUTPATIENT)
Dept: PRIMARY CARE CLINIC | Age: 72
End: 2022-12-01
Payer: MEDICARE

## 2022-12-01 VITALS
DIASTOLIC BLOOD PRESSURE: 70 MMHG | OXYGEN SATURATION: 93 % | BODY MASS INDEX: 35.02 KG/M2 | HEART RATE: 74 BPM | SYSTOLIC BLOOD PRESSURE: 128 MMHG | TEMPERATURE: 97.6 F | WEIGHT: 204 LBS

## 2022-12-01 DIAGNOSIS — J40 BRONCHITIS: Primary | ICD-10-CM

## 2022-12-01 PROCEDURE — 1036F TOBACCO NON-USER: CPT | Performed by: FAMILY MEDICINE

## 2022-12-01 PROCEDURE — 3074F SYST BP LT 130 MM HG: CPT | Performed by: FAMILY MEDICINE

## 2022-12-01 PROCEDURE — 1123F ACP DISCUSS/DSCN MKR DOCD: CPT | Performed by: FAMILY MEDICINE

## 2022-12-01 PROCEDURE — G8417 CALC BMI ABV UP PARAM F/U: HCPCS | Performed by: FAMILY MEDICINE

## 2022-12-01 PROCEDURE — 3078F DIAST BP <80 MM HG: CPT | Performed by: FAMILY MEDICINE

## 2022-12-01 PROCEDURE — G8427 DOCREV CUR MEDS BY ELIG CLIN: HCPCS | Performed by: FAMILY MEDICINE

## 2022-12-01 PROCEDURE — 3017F COLORECTAL CA SCREEN DOC REV: CPT | Performed by: FAMILY MEDICINE

## 2022-12-01 PROCEDURE — G8484 FLU IMMUNIZE NO ADMIN: HCPCS | Performed by: FAMILY MEDICINE

## 2022-12-01 PROCEDURE — 99214 OFFICE O/P EST MOD 30 MIN: CPT | Performed by: FAMILY MEDICINE

## 2022-12-01 RX ORDER — PREDNISONE 10 MG/1
TABLET ORAL
Qty: 12 TABLET | Refills: 0 | Status: SHIPPED | OUTPATIENT
Start: 2022-12-01 | End: 2022-12-07

## 2022-12-01 RX ORDER — DOXYCYCLINE HYCLATE 100 MG/1
100 CAPSULE ORAL 2 TIMES DAILY
Qty: 20 CAPSULE | Refills: 0 | Status: SHIPPED | OUTPATIENT
Start: 2022-12-01 | End: 2022-12-11

## 2022-12-01 NOTE — PROGRESS NOTES
Prema Marquez : 1950 Sex: male  Age: 81 y.o. Chief Complaint   Patient presents with    Cough       HPI  HPI:    Presents today with his wife, asks to be seen while he is here, has had URI for over a week. Although starting to get better still cough productive, intermittent wheezing no shortness of breath fever chills malaise sinus pain pressure abdominal symptoms. Risk factors reviewed. Denies headache dizziness syncope near syncope hemoptysis abdominal pain nausea vomiting chest pain or otherwise      ROS:  As above      Current Outpatient Medications:     doxycycline hyclate (VIBRAMYCIN) 100 MG capsule, Take 1 capsule by mouth 2 times daily for 10 days, Disp: 20 capsule, Rfl: 0    predniSONE (DELTASONE) 10 MG tablet, Take 3 tablets by mouth daily for 2 days, THEN 2 tablets daily for 2 days, THEN 1 tablet daily for 2 days. , Disp: 12 tablet, Rfl: 0    FLUoxetine (PROZAC) 10 MG capsule, Take 1 capsule by mouth daily (In addition to the 20mg daily for 30mg daily total), Disp: 30 capsule, Rfl: 1    omeprazole (PRILOSEC) 20 MG delayed release capsule, Take 1 capsule by mouth daily Ideally 1hr prior to dinner, Disp: 90 capsule, Rfl: 3    rosuvastatin (CRESTOR) 10 MG tablet, Take 1 tablet by mouth daily, Disp: 90 tablet, Rfl: 3    tiotropium (SPIRIVA) 18 MCG inhalation capsule, Inhale 1 capsule into the lungs daily, Disp: 90 capsule, Rfl: 3    vitamin D (ERGOCALCIFEROL) 1.25 MG (00675 UT) CAPS capsule, Take 1 capsule by mouth once a week, Disp: 12 capsule, Rfl: 3    metoprolol succinate (TOPROL XL) 25 MG extended release tablet, Take 0.5 tablets by mouth daily, Disp: 45 tablet, Rfl: 3    FLUoxetine (PROZAC) 20 MG capsule, Take 1 capsule by mouth daily, Disp: 90 capsule, Rfl: 3    albuterol sulfate HFA (PROVENTIL;VENTOLIN;PROAIR) 108 (90 Base) MCG/ACT inhaler, 1-2 puffs q4-6hrs prn, Disp: 3 each, Rfl: 3    allopurinol (ZYLOPRIM) 300 MG tablet, Take 1 tablet by mouth daily, Disp: 90 tablet, Rfl: 3 Multiple Vitamin (MULTIVITAMIN PO), Take by mouth daily, Disp: , Rfl:     colchicine (COLCRYS) 0.6 MG tablet, Two tablets by mouth x 1 at first sign of gout, then one tablet one hour later, Disp: 30 tablet, Rfl: 0    aspirin 81 MG EC tablet, Take 81 mg by mouth daily. , Disp: , Rfl:   No Known Allergies    Past Medical History:   Diagnosis Date    CAD (coronary artery disease)     COPD (chronic obstructive pulmonary disease) ([facility] [address]     Hyperlipidemia     Hypertension      Past Surgical History:   Procedure Laterality Date    CORONARY ARTERY BYPASS GRAFT  11    ACB X 5-  [facility] CATH LAB PROCEDURE  11    DR. VÁSQUEZ    TONSILLECTOMY AND ADENOIDECTOMY       Family History   Problem Relation Age of Onset    Thyroid Disease Mother     Heart Attack Father 61    Hypertension Father     Hypertension Sister     Heart Surgery Sister     Cirrhosis Brother     Hypertension Sister     Heart Surgery Sister     Heart Surgery Sister     Hypertension Sister      Social History     Tobacco Use    Smoking status: Former     Packs/day: 1.00     Years: 45.00     Pack years: 45.00     Types: Cigarettes     Start date: 1970     Quit date: 2021     Years since quittin.2    Smokeless tobacco: Never   Vaping Use    Vaping Use: Never used   Substance Use Topics    Alcohol use: Yes     Comment: occassionally on holidays    Drug use: No      Social History     Social History Narrative        PMH:    Problem List: 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 FH:    Father:     age 61 - MI - hypertension.     Mother:     age 66 - post-op complications - thyroid, reflux - post op after hip fx (septic). Brother -  52's cirrhosis from alcoholism    Brother (twin identical) htn (he follows with physicians regularly)    Sisters x 3 - all with HTN and 1 with CABG age 71 (stent 76)    Reviewed, no changes. SH:    Marital: Legal Status: . Previously Worked as , asbestos exposure, follows with work physicians    Personal Habits: Cigarette Use: Former Cigarette Smoker - quit ., now smoking again as of . Alcohol: Denies alcohol use. Vitals:    22 1203   BP: 128/70   Pulse: 74   Temp: 97.6 °F (36.4 °C)   SpO2: 93%   Weight: 204 lb (92.5 kg)     Wt Readings from Last 3 Encounters:   22 204 lb (92.5 kg)   10/24/22 204 lb (92.5 kg)   10/04/22 200 lb (90.7 kg)        Physical Exam    Exam:  Const: Appears comfortable. No signs of acute distress present. Head/Face: Atraumatic, normocephalic on inspection. Eyes: No discharge from the eyes. Sclerae clear. ENMT: Ears fluid nose boggy oropharynx thick postnasal drainage exudate  Neck: Supple. Palpation reveals no adenopathy. No masses appreciated. No JVD. Resp: Rhonchi right lower lobe improved with cough otherwise clear  CV: RRR   Extremities: No clubbing or cyanosis. No edema of the lower limbs  bilaterally. No calf inflammation or tenderness. Abdomen: Abdomen is soft, nontender, and nondistended. No abdominal masses  appreciated. No palpable hepatosplenomegaly. Bowel sounds are normoactive. Skin: Dry and warm with no rash. Muscular skeletal: No acute joint inflammation. Neuro:Grossly intact without focal deficit      Office Labs This Visit :  No results found for this visit on 22. Assessment and Plan:    1. Bronchitis  Counseled extensively. Differential reviewed, including serious etiologies. Rule out pneumonia symptoms been present for more than a week, although somewhat improved still persistent. Check chest x-ray.   Various viruses including COVID precautions reviewed. Albuterol as directed as needed. Doxy with precautions including C. difficile, risk benefits of probiotic reviewed. Prednisone taper with precautions. Counseled on proper hydration coolmist vaporizer plain Mucinex honey etc.  - XR CHEST (2 VW); Future  - doxycycline hyclate (VIBRAMYCIN) 100 MG capsule; Take 1 capsule by mouth 2 times daily for 10 days  Dispense: 20 capsule; Refill: 0  - predniSONE (DELTASONE) 10 MG tablet; Take 3 tablets by mouth daily for 2 days, THEN 2 tablets daily for 2 days, THEN 1 tablet daily for 2 days. Dispense: 12 tablet; Refill: 0      No problem-specific Assessment & Plan notes found for this encounter. Plan as above. Counseled extensively and differential diagnoses relevant to above were reviewed, including serious etiologies, risks and complications, especially of left uncontrolled. If relevant, instructions and  alternatives to meds/treatment reviewed, as well as interactions, and  SE's/ADRs reviewed, notify immediately if any, discontinuing new meds if any. Plan made after discussion and shared decision making. Call for pending results, simply wants a follow-up January blood work sooner if symptoms persist or worsen anyway. Precautions reviewed. As long as symptoms steadily improve/resolve, and medical conditions follow the expected course, FU as below, sooner PRN. Return for January sooner as needed. Educational materials and/or home exercises printed for patient's review and were included in patient instructions on his/her After Visit Summary and given to patient at the end of visit. After discussion, patient and/or guardian verbalizes understanding, agrees, feels comfortable with and wishes to proceed with above treatment plan. Call for any pending results, FU sooner if abnormal, as needed or if any current symptoms persist/worsen.       Advised patient to call with any new medication issues, and read all Rx info from pharmacy to assure aware of all possible risks and side effects of medication before taking. Reviewed age and gender appropriate health screening exams and vaccinations. Advised patient regarding importance of keeping up with recommended health maintenance and to schedule as soon as possible if overdue, as this is important in assessing for undiagnosed pathology, especially cancer, as well as protecting against potentially harmful/life threatening disease. Patient and/or guardian verbalizes understanding and agrees with above counseling, assessment and plan. All questions answered. Signs and symptoms to watch for discussed, serious signs and symptoms reviewed. ER if any. Mike Shah MD    Patients are advised to check with insurance company to ensure coverage and to fully understand benefits and cost prior to any testing. This note was created with the assistance of voice recognition software. Document was reviewed however may contain grammatical errors.

## 2023-01-03 DIAGNOSIS — J44.9 CHRONIC OBSTRUCTIVE PULMONARY DISEASE, UNSPECIFIED COPD TYPE (HCC): ICD-10-CM

## 2023-01-10 DIAGNOSIS — M1A.9XX0 CHRONIC GOUT WITHOUT TOPHUS, UNSPECIFIED CAUSE, UNSPECIFIED SITE: ICD-10-CM

## 2023-01-10 DIAGNOSIS — E78.2 MIXED HYPERLIPIDEMIA: ICD-10-CM

## 2023-01-10 DIAGNOSIS — K21.9 GASTROESOPHAGEAL REFLUX DISEASE WITHOUT ESOPHAGITIS: ICD-10-CM

## 2023-01-10 DIAGNOSIS — E55.9 VITAMIN D DEFICIENCY: ICD-10-CM

## 2023-01-10 DIAGNOSIS — E11.65 TYPE 2 DIABETES MELLITUS WITH HYPERGLYCEMIA, WITHOUT LONG-TERM CURRENT USE OF INSULIN (HCC): ICD-10-CM

## 2023-01-10 DIAGNOSIS — E53.8 VITAMIN B12 DEFICIENCY: ICD-10-CM

## 2023-01-10 LAB
ALBUMIN SERPL-MCNC: 4.5 G/DL (ref 3.5–5.2)
ALP BLD-CCNC: 65 U/L (ref 40–129)
ALT SERPL-CCNC: 29 U/L (ref 0–40)
ANION GAP SERPL CALCULATED.3IONS-SCNC: 12 MMOL/L (ref 7–16)
AST SERPL-CCNC: 29 U/L (ref 0–39)
BACTERIA: NORMAL /HPF
BASOPHILS ABSOLUTE: 0.05 E9/L (ref 0–0.2)
BASOPHILS RELATIVE PERCENT: 0.7 % (ref 0–2)
BILIRUB SERPL-MCNC: 0.6 MG/DL (ref 0–1.2)
BILIRUBIN URINE: NEGATIVE
BLOOD, URINE: NORMAL
BUN BLDV-MCNC: 18 MG/DL (ref 6–23)
CALCIUM SERPL-MCNC: 10.6 MG/DL (ref 8.6–10.2)
CHLORIDE BLD-SCNC: 102 MMOL/L (ref 98–107)
CHOLESTEROL, TOTAL: 243 MG/DL (ref 0–199)
CLARITY: CLEAR
CO2: 28 MMOL/L (ref 22–29)
COLOR: YELLOW
CREAT SERPL-MCNC: 1.2 MG/DL (ref 0.7–1.2)
CREATININE URINE: 197 MG/DL (ref 40–278)
EOSINOPHILS ABSOLUTE: 0.26 E9/L (ref 0.05–0.5)
EOSINOPHILS RELATIVE PERCENT: 3.9 % (ref 0–6)
FOLATE: >20 NG/ML (ref 4.8–24.2)
GFR SERPL CREATININE-BSD FRML MDRD: >60 ML/MIN/1.73
GLUCOSE BLD-MCNC: 117 MG/DL (ref 74–99)
GLUCOSE URINE: NEGATIVE MG/DL
HBA1C MFR BLD: 6.1 % (ref 4–5.6)
HCT VFR BLD CALC: 48.9 % (ref 37–54)
HDLC SERPL-MCNC: 35 MG/DL
HEMOGLOBIN: 16.8 G/DL (ref 12.5–16.5)
IMMATURE GRANULOCYTES #: 0.02 E9/L
IMMATURE GRANULOCYTES %: 0.3 % (ref 0–5)
KETONES, URINE: NEGATIVE MG/DL
LDL CHOLESTEROL CALCULATED: 155 MG/DL (ref 0–99)
LEUKOCYTE ESTERASE, URINE: NEGATIVE
LYMPHOCYTES ABSOLUTE: 1.79 E9/L (ref 1.5–4)
LYMPHOCYTES RELATIVE PERCENT: 26.8 % (ref 20–42)
MCH RBC QN AUTO: 29.8 PG (ref 26–35)
MCHC RBC AUTO-ENTMCNC: 34.4 % (ref 32–34.5)
MCV RBC AUTO: 86.9 FL (ref 80–99.9)
MICROALBUMIN UR-MCNC: 19.7 MG/L
MICROALBUMIN/CREAT UR-RTO: 10 (ref 0–30)
MONOCYTES ABSOLUTE: 0.56 E9/L (ref 0.1–0.95)
MONOCYTES RELATIVE PERCENT: 8.4 % (ref 2–12)
NEUTROPHILS ABSOLUTE: 3.99 E9/L (ref 1.8–7.3)
NEUTROPHILS RELATIVE PERCENT: 59.9 % (ref 43–80)
NITRITE, URINE: NEGATIVE
PDW BLD-RTO: 13.7 FL (ref 11.5–15)
PH UA: 5.5 (ref 5–9)
PLATELET # BLD: 170 E9/L (ref 130–450)
PMV BLD AUTO: 9.6 FL (ref 7–12)
POTASSIUM SERPL-SCNC: 5.2 MMOL/L (ref 3.5–5)
PROTEIN UA: NEGATIVE MG/DL
RBC # BLD: 5.63 E12/L (ref 3.8–5.8)
RBC UA: NORMAL /HPF (ref 0–2)
SODIUM BLD-SCNC: 142 MMOL/L (ref 132–146)
SPECIFIC GRAVITY UA: 1.02 (ref 1–1.03)
TOTAL CK: 75 U/L (ref 20–200)
TOTAL PROTEIN: 7.7 G/DL (ref 6.4–8.3)
TRIGL SERPL-MCNC: 265 MG/DL (ref 0–149)
TSH SERPL DL<=0.05 MIU/L-ACNC: 2.34 UIU/ML (ref 0.27–4.2)
URIC ACID, SERUM: 9.1 MG/DL (ref 3.4–7)
UROBILINOGEN, URINE: 0.2 E.U./DL
VITAMIN B-12: 675 PG/ML (ref 211–946)
VITAMIN D 25-HYDROXY: 32 NG/ML (ref 30–100)
VLDLC SERPL CALC-MCNC: 53 MG/DL
WBC # BLD: 6.7 E9/L (ref 4.5–11.5)
WBC UA: NORMAL /HPF (ref 0–5)

## 2023-01-10 RX ORDER — ALLOPURINOL 300 MG/1
300 TABLET ORAL DAILY
Qty: 90 TABLET | Refills: 1 | Status: SHIPPED | OUTPATIENT
Start: 2023-01-10

## 2023-01-10 NOTE — RESULT ENCOUNTER NOTE
Uric acid high, triglyceride and cholesterol high, potassium mildly elevated as is calcium. Encourage proper hydration, low uric acid low-cholesterol diet. Avoid potassium and calcium supplements and excess intake. Keep follow-up to review more detail sooner as needed.   Notify if symptoms

## 2023-01-10 NOTE — TELEPHONE ENCOUNTER
Medication and pharmacy info verified with the pt 's wife    Last Appointment:  12/1/2022    Future appts:  Future Appointments   Date Time Provider Rosemarie Elder   1/16/2023  3:00 PM MD JOANN Lam [facility]

## 2023-01-16 ENCOUNTER — OFFICE VISIT (OUTPATIENT)
Dept: PRIMARY CARE CLINIC | Age: 73
End: 2023-01-16
Payer: MEDICARE

## 2023-01-16 VITALS
TEMPERATURE: 97.4 F | DIASTOLIC BLOOD PRESSURE: 78 MMHG | BODY MASS INDEX: 33.48 KG/M2 | HEIGHT: 64 IN | SYSTOLIC BLOOD PRESSURE: 132 MMHG | OXYGEN SATURATION: 96 % | WEIGHT: 196.13 LBS | HEART RATE: 86 BPM

## 2023-01-16 DIAGNOSIS — F32.A ANXIETY AND DEPRESSION: ICD-10-CM

## 2023-01-16 DIAGNOSIS — F41.9 ANXIETY AND DEPRESSION: ICD-10-CM

## 2023-01-16 DIAGNOSIS — E78.2 MIXED HYPERLIPIDEMIA: ICD-10-CM

## 2023-01-16 DIAGNOSIS — E87.5 HYPERKALEMIA: ICD-10-CM

## 2023-01-16 DIAGNOSIS — Z87.891 PERSONAL HISTORY OF TOBACCO USE: ICD-10-CM

## 2023-01-16 DIAGNOSIS — E83.52 HYPERCALCEMIA: ICD-10-CM

## 2023-01-16 DIAGNOSIS — M1A.9XX0 CHRONIC GOUT WITHOUT TOPHUS, UNSPECIFIED CAUSE, UNSPECIFIED SITE: ICD-10-CM

## 2023-01-16 DIAGNOSIS — J44.9 CHRONIC OBSTRUCTIVE PULMONARY DISEASE, UNSPECIFIED COPD TYPE (HCC): Primary | ICD-10-CM

## 2023-01-16 DIAGNOSIS — Z13.6 SCREENING FOR AAA (ABDOMINAL AORTIC ANEURYSM): ICD-10-CM

## 2023-01-16 DIAGNOSIS — E11.65 TYPE 2 DIABETES MELLITUS WITH HYPERGLYCEMIA, WITHOUT LONG-TERM CURRENT USE OF INSULIN (HCC): ICD-10-CM

## 2023-01-16 PROCEDURE — G8427 DOCREV CUR MEDS BY ELIG CLIN: HCPCS | Performed by: FAMILY MEDICINE

## 2023-01-16 PROCEDURE — 3044F HG A1C LEVEL LT 7.0%: CPT | Performed by: FAMILY MEDICINE

## 2023-01-16 PROCEDURE — 1123F ACP DISCUSS/DSCN MKR DOCD: CPT | Performed by: FAMILY MEDICINE

## 2023-01-16 PROCEDURE — 3078F DIAST BP <80 MM HG: CPT | Performed by: FAMILY MEDICINE

## 2023-01-16 PROCEDURE — 3017F COLORECTAL CA SCREEN DOC REV: CPT | Performed by: FAMILY MEDICINE

## 2023-01-16 PROCEDURE — 3023F SPIROM DOC REV: CPT | Performed by: FAMILY MEDICINE

## 2023-01-16 PROCEDURE — 3075F SYST BP GE 130 - 139MM HG: CPT | Performed by: FAMILY MEDICINE

## 2023-01-16 PROCEDURE — G8484 FLU IMMUNIZE NO ADMIN: HCPCS | Performed by: FAMILY MEDICINE

## 2023-01-16 PROCEDURE — 99215 OFFICE O/P EST HI 40 MIN: CPT | Performed by: FAMILY MEDICINE

## 2023-01-16 PROCEDURE — 2022F DILAT RTA XM EVC RTNOPTHY: CPT | Performed by: FAMILY MEDICINE

## 2023-01-16 PROCEDURE — G0296 VISIT TO DETERM LDCT ELIG: HCPCS | Performed by: FAMILY MEDICINE

## 2023-01-16 PROCEDURE — G8417 CALC BMI ABV UP PARAM F/U: HCPCS | Performed by: FAMILY MEDICINE

## 2023-01-16 PROCEDURE — 1036F TOBACCO NON-USER: CPT | Performed by: FAMILY MEDICINE

## 2023-01-16 RX ORDER — ALBUTEROL SULFATE 90 UG/1
AEROSOL, METERED RESPIRATORY (INHALATION)
Qty: 1 EACH | Refills: 3 | Status: SHIPPED | OUTPATIENT
Start: 2023-01-16

## 2023-01-16 RX ORDER — ROSUVASTATIN CALCIUM 10 MG/1
10 TABLET, COATED ORAL DAILY
Qty: 90 TABLET | Refills: 3 | Status: SHIPPED | OUTPATIENT
Start: 2023-01-16

## 2023-01-16 ASSESSMENT — PATIENT HEALTH QUESTIONNAIRE - PHQ9
SUM OF ALL RESPONSES TO PHQ9 QUESTIONS 1 & 2: 0
7. TROUBLE CONCENTRATING ON THINGS, SUCH AS READING THE NEWSPAPER OR WATCHING TELEVISION: 0
5. POOR APPETITE OR OVEREATING: 0
6. FEELING BAD ABOUT YOURSELF - OR THAT YOU ARE A FAILURE OR HAVE LET YOURSELF OR YOUR FAMILY DOWN: 0
2. FEELING DOWN, DEPRESSED OR HOPELESS: 0
1. LITTLE INTEREST OR PLEASURE IN DOING THINGS: 0
SUM OF ALL RESPONSES TO PHQ QUESTIONS 1-9: 0
8. MOVING OR SPEAKING SO SLOWLY THAT OTHER PEOPLE COULD HAVE NOTICED. OR THE OPPOSITE, BEING SO FIGETY OR RESTLESS THAT YOU HAVE BEEN MOVING AROUND A LOT MORE THAN USUAL: 0
3. TROUBLE FALLING OR STAYING ASLEEP: 0
9. THOUGHTS THAT YOU WOULD BE BETTER OFF DEAD, OR OF HURTING YOURSELF: 0
SUM OF ALL RESPONSES TO PHQ QUESTIONS 1-9: 0
4. FEELING TIRED OR HAVING LITTLE ENERGY: 0
SUM OF ALL RESPONSES TO PHQ QUESTIONS 1-9: 0
SUM OF ALL RESPONSES TO PHQ QUESTIONS 1-9: 0
10. IF YOU CHECKED OFF ANY PROBLEMS, HOW DIFFICULT HAVE THESE PROBLEMS MADE IT FOR YOU TO DO YOUR WORK, TAKE CARE OF THINGS AT HOME, OR GET ALONG WITH OTHER PEOPLE: 0

## 2023-01-16 NOTE — PROGRESS NOTES
Joleen Nava : 1950 Sex: male  Age: 81 y.o. Chief Complaint   Patient presents with    Discuss Labs         HPI:      Patient presents today with his wife for follow-up. Overall feeling well. He canceled his EGD and colonoscopy not willing to get this done now. Again generally feels well. Willing to get LDCT and AAA screening, and caring for his wife he admits he stopped a lot of his medications including Crestor and allopurinol. Blood work reflective. Uric acid up to 9.1. All states he was dehydrated when he got blood work. Potassium up at 5.2 calcium 10.6 glucose under 17 M82 174 folic acid greater 20 LDL up to 155 from 64 HDL 35 LFTs normal TSH normal vitamin D 32 hemoglobin 16.8 and smoking cessation emphasized urinalysis trace blood 1-3 RBC microalbumin creatinine ratio 10.0.   Saw urology in the past.  Defers other work-up        Most Recent Labs  CBC  Lab Results   Component Value Date/Time    WBC 6.7 01/10/2023 10:25 AM    WBC 6.1 2022 12:11 PM    WBC 6.4 2022 11:23 AM    RBC 5.63 01/10/2023 10:25 AM    RBC 5.22 2022 12:11 PM    RBC 5.27 2022 11:23 AM    HGB 16.8 01/10/2023 10:25 AM    HGB 16.1 2022 12:11 PM    HGB 15.6 2022 11:23 AM    HCT 48.9 01/10/2023 10:25 AM    HCT 48.4 2022 12:11 PM    HCT 47.5 2022 11:23 AM    MCV 86.9 01/10/2023 10:25 AM    MCV 92.7 2022 12:11 PM    MCV 90.1 2022 11:23 AM     01/10/2023 10:25 AM     2022 12:11 PM     2022 11:23 AM      CMP  Lab Results   Component Value Date/Time     01/10/2023 10:25 AM     2022 12:11 PM     2022 11:23 AM    K 5.2 01/10/2023 10:25 AM    K 5.1 2022 12:11 PM    K 4.5 2022 11:23 AM     01/10/2023 10:25 AM     2022 12:11 PM     2022 11:23 AM    CO2 28 01/10/2023 10:25 AM    CO2 24 2022 12:11 PM    CO2 22 2022 11:23 AM    ANIONGAP 12 01/10/2023 10:25 AM ANIONGAP 15 09/23/2022 12:11 PM    ANIONGAP 18 06/16/2022 11:23 AM    GLUCOSE 117 01/10/2023 10:25 AM    GLUCOSE 127 09/23/2022 12:11 PM    GLUCOSE 118 06/16/2022 11:23 AM    GLUCOSE 107 04/10/2012 11:24 AM    GLUCOSE 105 11/29/2011 10:30 AM    GLUCOSE 102 11/03/2011 10:45 AM    BUN 18 01/10/2023 10:25 AM    BUN 14 09/23/2022 12:11 PM    BUN 13 06/16/2022 11:23 AM    CREATININE 1.2 01/10/2023 10:25 AM    CREATININE 1.2 09/23/2022 12:11 PM    CREATININE 1.1 06/16/2022 11:23 AM    LABGLOM >60 01/10/2023 10:25 AM    LABGLOM 60 09/23/2022 12:11 PM    LABGLOM >60 06/16/2022 11:23 AM    LABGLOM 60 03/17/2022 12:28 PM    GFRAA >60 09/23/2022 12:11 PM    GFRAA >60 06/16/2022 11:23 AM    GFRAA >60 03/17/2022 12:28 PM    CALCIUM 10.6 01/10/2023 10:25 AM    CALCIUM 10.0 09/23/2022 12:11 PM    CALCIUM 9.8 06/16/2022 11:23 AM    PROT 7.7 01/10/2023 10:25 AM    PROT 7.7 09/23/2022 12:11 PM    PROT 7.5 06/16/2022 11:23 AM    LABALBU 4.5 01/10/2023 10:25 AM    LABALBU 4.4 09/23/2022 12:11 PM    LABALBU 4.5 06/16/2022 11:23 AM    LABALBU 4.5 04/10/2012 11:24 AM    LABALBU 4.4 11/29/2011 10:30 AM    LABALBU 4.8 08/18/2011 01:15 PM    BILITOT 0.6 01/10/2023 10:25 AM    BILITOT 0.5 09/23/2022 12:11 PM    BILITOT 0.6 06/16/2022 11:23 AM    ALKPHOS 65 01/10/2023 10:25 AM    ALKPHOS 55 09/23/2022 12:11 PM    ALKPHOS 53 06/16/2022 11:23 AM    AST 29 01/10/2023 10:25 AM    AST 27 09/23/2022 12:11 PM    AST 28 06/16/2022 11:23 AM    ALT 29 01/10/2023 10:25 AM    ALT 29 09/23/2022 12:11 PM    ALT 22 06/16/2022 11:23 AM     A1C  Lab Results   Component Value Date/Time    LABA1C 6.1 01/10/2023 10:25 AM    LABA1C 6.1 09/23/2022 12:11 PM    LABA1C 5.8 06/16/2022 11:23 AM     TSH  Lab Results   Component Value Date/Time    TSH 2.340 01/10/2023 10:25 AM    TSH 1.060 09/23/2022 12:11 PM    TSH 1.900 06/16/2022 11:23 AM     FREET4  No results found for: [id number]  LIPID  Lab Results   Component Value Date/Time    CHOL 243 01/10/2023 10:25 AM    CHOL 139 09/23/2022 12:11 PM    CHOL 139 06/16/2022 11:23 AM    HDL 35 01/10/2023 10:25 AM    HDL 35 09/23/2022 12:11 PM    HDL 36 06/16/2022 11:23 AM    LDLCALC 155 01/10/2023 10:25 AM    LDLCALC 64 09/23/2022 12:11 PM    LDLCALC 62 06/16/2022 11:23 AM    TRIG 265 01/10/2023 10:25 AM    TRIG 202 09/23/2022 12:11 PM    TRIG 205 06/16/2022 11:23 AM     VITAMIN D  Lab Results   Component Value Date/Time    VITD25 32 01/10/2023 10:25 AM    VITD25 41 09/23/2022 12:11 PM    VITD25 42 06/16/2022 11:23 AM     MAGNESIUM  Lab Results   Component Value Date/Time    MG 2.6 06/20/2011 03:18 AM    MG 2.3 06/11/2011 05:15 AM    MG 2.3 06/10/2011 04:10 AM      PHOS  Lab Results   Component Value Date/Time    PHOS 2.5 06/11/2011 05:15 AM    PHOS 4.1 06/10/2011 04:10 AM    PHOS 4.2 06/09/2011 03:10 AM      MARIA GUADALUPE   Lab Results   Component Value Date/Time    MARIA GUADALUPE NEGATIVE 08/30/2015 03:50 PM    MARIA GUADALUPE NEGATIVE 08/18/2011 01:15 PM    MARIA GUADALUPE NEGATIVE 06/06/2011 10:30 AM     RHEUMATOID FACTOR  Lab Results   Component Value Date/Time    RF <10 08/30/2015 03:50 PM    RF <4 08/18/2011 01:15 PM    RF <4 06/06/2011 10:30 AM     PSA  Lab Results   Component Value Date/Time    PSA 1.81 07/02/2021 10:57 AM    PSA 3.53 07/20/2020 12:08 PM    PSA 2.23 04/25/2019 09:22 AM      HEPATITIS C  Lab Results   Component Value Date/Time    HCVABI Non-Reactive 06/11/2018 08:46 AM     HIV  No results found for: [id number], HIV1QT  UA  Lab Results   Component Value Date/Time    COLORU Yellow 01/10/2023 10:25 AM    COLORU Yellow 09/23/2022 12:10 PM    COLORU Yellow 06/16/2022 11:24 AM    CLARITYU Clear 01/10/2023 10:25 AM    CLARITYU Clear 09/23/2022 12:10 PM    CLARITYU Clear 06/16/2022 11:24 AM    GLUCOSEU Negative 01/10/2023 10:25 AM    GLUCOSEU Negative 09/23/2022 12:10 PM    GLUCOSEU Negative 06/16/2022 11:24 AM    GLUCOSEU NEGATIVE 08/18/2011 01:05 PM    GLUCOSEU NEGATIVE 06/08/2011 05:30 PM    GLUCOSEU NEGATIVE 04/25/2011 11:30 AM    BILIRUBINUR Negative 01/10/2023 10:25 AM    BILIRUBINUR Negative 09/23/2022 12:10 PM    BILIRUBINUR Negative 06/16/2022 11:24 AM    BILIRUBINUR NEGATIVE 08/18/2011 01:05 PM    BILIRUBINUR NEGATIVE 06/08/2011 05:30 PM    BILIRUBINUR NEGATIVE 04/25/2011 11:30 AM    KETUA Negative 01/10/2023 10:25 AM    KETUA Negative 09/23/2022 12:10 PM    KETUA Negative 06/16/2022 11:24 AM    SPECGRAV 1.025 01/10/2023 10:25 AM    SPECGRAV 1.025 09/23/2022 12:10 PM    SPECGRAV 1.025 06/16/2022 11:24 AM    BLOODU TRACE-INTACT 01/10/2023 10:25 AM    BLOODU TRACE-INTACT 09/23/2022 12:10 PM    BLOODU Negative 06/16/2022 11:24 AM    PHUR 5.5 01/10/2023 10:25 AM    PHUR 6.0 09/23/2022 12:10 PM    PHUR 5.5 06/16/2022 11:24 AM    PROTEINU Negative 01/10/2023 10:25 AM    PROTEINU Negative 09/23/2022 12:10 PM    PROTEINU Negative 06/16/2022 11:24 AM    UROBILINOGEN 0.2 01/10/2023 10:25 AM    UROBILINOGEN 0.2 09/23/2022 12:10 PM    UROBILINOGEN 0.2 06/16/2022 11:24 AM    NITRU Negative 01/10/2023 10:25 AM    NITRU Negative 09/23/2022 12:10 PM    NITRU Negative 06/16/2022 11:24 AM    LEUKOCYTESUR Negative 01/10/2023 10:25 AM    LEUKOCYTESUR TRACE 09/23/2022 12:10 PM    LEUKOCYTESUR Negative 06/16/2022 11:24 AM     Urine Micro/Albumin Ratio  Lab Results   Component Value Date/Time    MALBCR 10.0 01/10/2023 10:25 AM    MALBCR 7.7 09/23/2022 12:10 PM    MALBCR 10.1 06/16/2022 11:24 AM       ROS:  Const: Denies chills, fever, malaise and sweats. Eyes: Denies discharge, pain, redness and, chronic left visual disturbance  ENMT: Denies earaches, other ear symptoms. Denies nasal or sinus symptoms other than stated  above. Denies mouth and tongue lesions and sore throat. CV: Denies chest discomfort, pain; diaphoresis, dizziness, edema, lightheadedness, orthopnea,  palpitations, syncope and near syncopal episode or any exertional symptoms  Resp: Denies cough, hemoptysis, pleuritic pain, SOB, sputum production and wheezing.   GI: Denies abdominal pain, reflux symptoms change in bowel habits, hematochezia, melena, nausea and vomiting.    : Denies urinary symptoms including dysuria , urgency, frequency or hematuria.  Musculo: Chronic low back pain with limited range of motion, chronic knee pain.  OA.  Skin: Denies bruising and rash.  Neuro: Denies headache, numbness, stiff neck, tingling and focal weakness slurred speech or facial  droop  Hema/Lymph: Denies bleeding/bruising tendency and enlarged lymph nodes        Current Outpatient Medications:     tiotropium (SPIRIVA) 18 MCG inhalation capsule, Inhale 1 capsule into the lungs daily, Disp: 30 capsule, Rfl: 12    albuterol sulfate HFA (PROVENTIL;VENTOLIN;PROAIR) 108 (90 Base) MCG/ACT inhaler, 1-2 puffs q4-6hrs prn, Disp: 1 each, Rfl: 3    rosuvastatin (CRESTOR) 10 MG tablet, Take 1 tablet by mouth daily, Disp: 90 tablet, Rfl: 3    allopurinol (ZYLOPRIM) 300 MG tablet, Take 1 tablet by mouth daily, Disp: 90 tablet, Rfl: 1    FLUoxetine (PROZAC) 10 MG capsule, Take 1 capsule by mouth daily (In addition to the 20mg daily for 30mg daily total), Disp: 30 capsule, Rfl: 1    omeprazole (PRILOSEC) 20 MG delayed release capsule, Take 1 capsule by mouth daily Ideally 1hr prior to dinner, Disp: 90 capsule, Rfl: 3    vitamin D (ERGOCALCIFEROL) 1.25 MG (97997 UT) CAPS capsule, Take 1 capsule by mouth once a week, Disp: 12 capsule, Rfl: 3    metoprolol succinate (TOPROL XL) 25 MG extended release tablet, Take 0.5 tablets by mouth daily, Disp: 45 tablet, Rfl: 3    FLUoxetine (PROZAC) 20 MG capsule, Take 1 capsule by mouth daily, Disp: 90 capsule, Rfl: 3    Multiple Vitamin (MULTIVITAMIN PO), Take by mouth daily, Disp: , Rfl:     colchicine (COLCRYS) 0.6 MG tablet, Two tablets by mouth x 1 at first sign of gout, then one tablet one hour later, Disp: 30 tablet, Rfl: 0    aspirin 81 MG EC tablet, Take 81 mg by mouth daily.  , Disp: , Rfl:   No Known Allergies    Past Medical History:   Diagnosis Date    CAD (coronary artery disease)     COPD (chronic obstructive  pulmonary disease) ([facility] [address]     Hyperlipidemia     Hypertension      Past Surgical History:   Procedure Laterality Date    CORONARY ARTERY BYPASS GRAFT  11    ACB X 5-  [address] CATH LAB PROCEDURE  11    DR. VÁSQUEZ    TONSILLECTOMY AND ADENOIDECTOMY       Family History   Problem Relation Age of Onset    Thyroid Disease Mother     Heart Attack Father 61    Hypertension Father     Hypertension Sister     Heart Surgery Sister     Cirrhosis Brother     Hypertension Sister     Heart Surgery Sister     Heart Surgery Sister     Hypertension Sister      Social History     Tobacco Use    Smoking status: Former     Packs/day: 1.00     Years: 45.00     Pack years: 45.00     Types: Cigarettes     Start date: 1970     Quit date: 2021     Years since quittin.3    Smokeless tobacco: Never   Vaping Use    Vaping Use: Never used   Substance Use Topics    Alcohol use: Yes     Comment: occassionally on holidays    Drug use: No      Social History     Social History Narrative        PMH:    Problem List: 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 FH:    Father:     age 61 - MI - hypertension. Mother:     age 66 - post-op complications - thyroid, reflux - post op after hip fx (septic). Brother -  52's cirrhosis from alcoholism    Brother (twin identical) htn (he follows with physicians regularly)    Sisters x 3 - all with HTN and 1 with CABG age 71 (stent 76)    Reviewed, no changes. SH:    Marital: Legal Status: . Previously Worked as , asbestos exposure, follows with work physicians    Personal Habits: Cigarette Use: Former Cigarette Smoker - quit ., now smoking again as of . Alcohol: Denies alcohol use. FH:  Father:   age 61 - MI - hypertension. Mother:   age 66 - post-op complications - thyroid, reflux - post op after hip fx (septic). Brother -  52's cirrhosis from alcoholism  Brother (twin identical) htn (he follows with physicians regularly)  Sisters x 3 - all with HTN and 1 with CABG age 71 (stent 76)                  Vitals:    23 1502   BP: 132/78   Pulse: 86   Temp: 97.4 °F (36.3 °C)   SpO2: 96%   Weight: 196 lb 2 oz (89 kg)   Height: 5' 4\" (1.626 m)      Wt Readings from Last 3 Encounters:   23 196 lb 2 oz (89 kg)   22 204 lb (92.5 kg)   10/24/22 204 lb (92.5 kg)        Physical Exam  Exam:  Const: Appears comfortable. No signs of acute distress present. Head/Face: Atraumatic on inspection. Eyes: EOMI in both eyes. No discharge from the eyes. PERRL. Sclerae clear. ENMT: Auditory canals normal. Tympanic membranes: intact and translucent. External nose WNL. Nasal mucosa is clear. Oropharynx: No erythema or exudate. Posterior pharynx is normal.  Neck: Supple. Palpation reveals no adenopathy. No masses appreciated. No JVD. Carotids: no  bruits. Resp: Respirations are unlabored. Clear to auscultation. No rales, rhonchi or wheezes appreciated  over the lungs bilaterally. CV: Rate is regular. Rhythm is regular. No gallop or rubs. No heart murmur appreciated. Extremities: No clubbing, cyanosis, or edema. No calf inflammation or tenderness. Abdomen: Bowel sounds are normoactive. Abdomen is soft, nontender, and nondistended. No  abdominal masses. No palpable hepatosplenomegaly. Lymph: No palpable or visible regional lymphadenopathy. Musculoskeletal: no acute joint inflammation except chronic low back pain with limited range of motion. . Crepitus bilateral knees  Skin: Dry and warm with no rash.  Skin normal to inspection and palpation overall.  Neuro: Alert and oriented. Affect: appropriate. Upper Extremities: 5/5 bilaterally. Lower Extremities:  5/5 bilaterally. Sensation intact to light touch. Reflexes: DTR's are symmetric and 2+ bilaterally. .  Cranial Nerves: Cranial nerves grossly intact.   Genital/rectal-defers      Assessment and Plan:   Diagnosis Orders   1. Chronic obstructive pulmonary disease, unspecified COPD type (HCC)  tiotropium (SPIRIVA) 18 MCG inhalation capsule    albuterol sulfate HFA (PROVENTIL;VENTOLIN;PROAIR) 108 (90 Base) MCG/ACT inhaler      2. Mixed hyperlipidemia  rosuvastatin (CRESTOR) 10 MG tablet    Lipid Panel    Comprehensive Metabolic Panel    CK      3. Type 2 diabetes mellitus with hyperglycemia, without long-term current use of insulin (HCC)        4. Hypercalcemia  PTH, Intact    Calcium, Ionized      5. Hyperkalemia        6. Screening for AAA (abdominal aortic aneurysm)  US screening for AAA      7. Personal history of tobacco use  AK VISIT TO DISCUSS LUNG CA SCREEN W LDCT    CT Lung Screen (Annual/Baseline)      8. Chronic gout without tophus, unspecified cause, unspecified site  Uric Acid      9. Anxiety and depression            No problem-specific Assessment & Plan notes found for this encounter.     AAA screen  Smoking cessation emphasized.  Check ultrasound.    Anxiety and depression  With increased family stressors.  We had previously referred Dr. Gibbs and I encouraged him to call, he has not yet.   Rec mended and he is planning to start counseling as well and to have a counselor.  Adamantly denies SI/HI.  He is feeling much better on higher dose fluoxetine with  standard precautions reviewed including prolonged QT SIADH paradoxical reaction etc. now on 30 mg daily.    Insomnia, unspecified type  Counseled, benefiting from melatonin, risk benefits reviewed, has benefited from increased fluoxetine as above.      H. pylori infection    Counseled extensively. Differential reviewed, including serious  etiologies. symptoms resolved after triple therapy. He was following with Dr. Elvira Paul and planning EGD/colonoscopy November 2022 but since canceled does not wish to follow-up now. Gastro-esophageal reflux disease without esophagitis  Care Plan:  Comments : States long-term symptoms, resolved with omeprazole, 6/22 tested positive for H. pylori, since treated and feeling better. Differential of causes and consequences reviewed. Now on omeprazole with standard precautions including RI/electrolyte imbalance. Was following with Dr. Elvira Paul and planning November EGD/colonoscopy [phone] but does not wish further E/T now, asymptomatic. Used to take Pepcid. Cholelithiasis  Incidentally noted on LDCT. Ultrasound March/2022 again showed several small gallstones mild fatty liver, asymptomatic now. Await Dr. Elvira Paul, encouraged small frequent meals, low-fat diet, notify if symptoms    Colon cancer screening  Was planning colonoscopy November 2022 but now refusing. Still awaiting fit test       Hypertension  Care Plan:  Comments :  History of whitecoat hypertension,excellent at home. Tolerating therapy. Risk of HTN reviewed. lifestyle modification emphasized. hyper and hypotensive precautions reviewed pulse parameters also  reviewed. Declines ACE inhibitor or ARB. Other hyperlipidemia  Care Plan:  Comments :   he was tolerating and doing very well with Crestor but stopped it and LDL went up significantly. He is going to resume it. Dose-dependent benefits reviewed goals reviewed. .SE's/Instructions/Risks/Benefits reviewed. If  ADR's, D/C and notify. The risks  and benefits of Vitamin D3 and Coenzyme Q-10 supplementation reviewed. risk of hyperlipidemia reviewed lifestyle modification reviewed. Goals reviewed. Declines change in therapy. Previously on Lipitor but unaffordable. Goals reviewed     Liver disease, unspecified  Care Plan:  Comments : Counseled extensively.  Differential reviewed, including serious etiologies. Likely fatty liver. Precautions reviewed. Lifestyle modification appropriate diet  and weight loss reviewed. Risks of even this reviewed. Other than basic monitoring on interested in other evaluation or treatment,  in-depth blood work, imaging or otherwise. Hepatitis C screen negative. Defers  further evaluation or treatment otherwise. LFTs now normal     Hematuria, unspecified  Care Plan:  Comments : Counseled extensively. Differential reviewed, including serious etiologies. Asymptomatic. Continue per Dr. Alexandra Session . higher risk given  tobacco use reviewed. Although I have encouraged him to follow-up with Dr. Alexandra Session but, he is not interested now. Mauricio Glynns His wife states several family hours also have this condition. Does not wish other E/T. Encounter for general adult medical examination with bnormal  findings  Care Plan:  Comments : Health maintenance issues discussed at length 8/11/2021 encouraged yearly  Physicals. Check AAA screen LDCT. Atherosclerotic heart disease of native coronary artery without angina  pectoris  Care Plan:  Comments : Status post CABG. asx. encouraged fu w/ cardiologist, previously dr Kedar Eugene,  declines. currently off ACE inhibitor/ARB and declines at this time. Continue per cardiology. Stress test 8/21 was low risk      Type 2 diabetes mellitus with hyperglycemia  Care Plan:  Comments :  extensively. watch BS closely ambulatory. Parameters given. Call if not  in range. ER if dangerous numbers. Macro and microvascular complications  reviewed. Importance of at least yearly eye exams and daily foot exams  reviewed. Risks and benefits of insulin sensitizers reviewed and he declines  now.   hemoglobin A1c has remained relatively stable 5.7-5.5-5.6 -5.4-5.7-5.8-6.1-6.1. Gout, unspecified  Care Plan:  Comments :  Continue to emphasized low uric acid diet. Proper hydration reviewed.   He has colcrys prn,2×1 at onset of  symptoms and repeat one hour later..  reviewed.  Uric acid goals reviewed.   Was at goal with allopurinol, 5.8-6.0-up to 9.1 after stopping allopurinol.  Resume 300 mg daily.  Recheck 1 month sooner as needed..  Continue allopurinol 300 mg daily.  Risk of hyperuricemia reviewed.        Chronic obstructive pulmonary disease, unspecified  Care Plan:  Comments : Asymptomatic.  Of the CT 7/21 - for nodules.  Defers pulmonary Function Tests, referral or otherwise.  Currently on  Spiriva and p.r.n. Proair which he rarely needs although a little more in the fall.  Check LDCTIntervertebral disc disorders with myelopathy, lumbar region  Care Plan:   Status post injections through [facility], then surgery with  discectomy/laminectomy through Dr. Pena. Continue per them. h/o PT     Vitamin D deficiency, unspecified  Care Plan:  Comments : Stable on prescription vitamin D,, continue monitoring     Disorder of prostate, unspecified  Care Plan:  Comments : Counseled extensively. Differential reviewed, including serious etiologies.rising  PSA is trending down from 2.6-2.2-3.53-1.81.  He should follow-up with Dr. Shelley, deferring now       Secondary polycythemia  Care Plan:  Comments : Likely related to smoking, other differential reviewed. Monitor. Declines further  eval/tx. fluctuates    Dysthymic disorder  Care Plan:  Comments : Stable. Continue current therapy as above. r/b meds reviewed.        CRI-  Counseled. Proper hydration. Avoid nephrotoxic agents monitor. Declines imaging or referral creatinine stable, currently normal         Tobacco abuse-counseled extensively on importance of abstinence.  States he gets chest x-ray and PFTs at work but, defers REF. check LDCT, states he had cut back somewhat, thinking about stopping, declines assistance    Bilateral knee OA  Failed conservative measures.  Continue per [facility]  Topicals reviewed.  R/B Tylenol reviewed.  Avoidance of NSAID recommendations  reviewed    Obesity  Counseled. Risk reviewed. Lifestyle modification emphasized. Counseled on Mediterranean diet/weight watchers. Declines formal invention. B12 deficiency  History of. Risk of low and high reviewed. Check level. Appropriate supplementation reviewed    Hyperkalemia  Mild, fluctuates. counseled on possible causes and consequences. Counseled on TP versus FP . Encourage avoiding excessive intake, encourage proper hydration, avoid supplements. Simply wants rechecked in 1 month sooner symptoms    Hypercalcemia  Counseled extensively. Differential reviewed, including serious etiologies. Potential causes of and risk of reviewed. Emphasize proper hydration avoid excess intake or supplement. Symptoms rechecked in 1 month defers other E/T         Plan as above. Counseled extensively and differential diagnoses relevant to above were reviewed, including serious etiologies, risks and complications, especially of left uncontrolled. If relevant, instructions and  alternatives to meds/treatment reviewed, as well as interactions, and  SE's/ADRs reviewed, notify immediately if any, discontinuing new meds if any. Plan made after discussion and shared decision making. No flowsheet data found. Counseled. Resume meds. Refilled. Proper hydration reviewed. Check LDCT. Check AAA ultrasound. Continue per specialist.  Otherwise simply wants blood work and follow-up 1 month sooner as needed. Defers otherwise. Compliance emphasized      As long as symptoms steadily improve/resolve, and medical conditions follow the expected course, FU as below, sooner PRN. Return in about 1 month (around 2/16/2023), or if symptoms worsen or fail to improve. Educational materials and/or home exercises printed for patient's review and were included in patient instructions on his/her After Visit Summary and given to patient at the end of visit.        After discussion, patient and/or guardian verbalizes understanding, agrees, feels comfortable with and wishes to proceed with above treatment plan. Call for any pending results, FU sooner if abnormal, as needed or if any current symptoms persist/worsen. Advised patient to call with any new medication issues, and read all Rx info from pharmacy to assure aware of all possible risks and side effects of medication before taking. Reviewed age and gender appropriate health screening exams and vaccinations. Advised patient regarding importance of keeping up with recommended health maintenance and to schedule as soon as possible if overdue, as this is important in assessing for undiagnosed pathology, especially cancer, as well as protecting against potentially harmful/life threatening disease. Patient and/or guardian verbalizes understanding and agrees with above counseling, assessment and plan. All questions answered. Signs and symptoms to watch for discussed, serious signs and symptoms reviewed. ER if any. Over 40 minutes  spent with the patient in reviewing records, reviewing with patient/family, counseling, ordering,  prescribing, completing h&p, etc., with over 50% of the time spent face to face counseling. Dong Polk MD    Patients are advised to check with insurance company to ensure coverage and to fully understand benefits and cost prior to any testing. This note was created with the assistance of voice recognition software. Document was reviewed however may contain grammatical errors. Discussed with the patient the current USPSTF guidelines released March 9, 2021 for screening for lung cancer. For adults aged 48 to [de-identified] years who have a 20 pack-year smoking history and currently smoke or have quit within the past 15 years the grade B recommendation is to:  Screen for lung cancer with low-dose computed tomography (LDCT) every year.   Stop screening once a person has not smoked for 15 years or has a health problem that limits life expectancy or the ability to have lung surgery.    The patient  reports that he quit smoking about 16 months ago. His smoking use included cigarettes. He started smoking about 53 years ago. He has a 45.00 pack-year smoking history. He has never used smokeless tobacco.. Discussed with patient the risks and benefits of screening, including over-diagnosis, false positive rate, and total radiation exposure.  The patient currently exhibits no signs or symptoms suggestive of lung cancer.  Discussed with patient the importance of compliance with yearly annual lung cancer screenings and willingness to undergo diagnosis and treatment if screening scan is positive.  In addition, the patient was counseled regarding the importance of remaining smoke free and/or total smoking cessation.    Also reviewed the following if the patient has Medicare that as of February 10, 2022, Medicare only covers LDCT screening in patients aged 50-77 with at least a 20 pack-year smoking history who currently smoke or have quit in the last 15 years

## 2023-01-16 NOTE — PATIENT INSTRUCTIONS

## 2023-01-22 ENCOUNTER — HOSPITAL ENCOUNTER (OUTPATIENT)
Dept: ULTRASOUND IMAGING | Age: 73
Discharge: HOME | End: 2023-01-24
Payer: MEDICARE

## 2023-01-22 ENCOUNTER — HOSPITAL ENCOUNTER (OUTPATIENT)
Dept: CT IMAGING | Age: 73
Discharge: HOME | End: 2023-01-24
Payer: MEDICARE

## 2023-01-22 DIAGNOSIS — Z87.891 PERSONAL HISTORY OF TOBACCO USE: ICD-10-CM

## 2023-01-22 DIAGNOSIS — Z13.6 SCREENING FOR AAA (ABDOMINAL AORTIC ANEURYSM): ICD-10-CM

## 2023-01-22 PROCEDURE — 71271 CT THORAX LUNG CANCER SCR C-: CPT

## 2023-01-22 PROCEDURE — 76775 US EXAM ABDO BACK WALL LIM: CPT

## 2023-01-23 ENCOUNTER — TELEPHONE (OUTPATIENT)
Dept: CASE MANAGEMENT | Age: 73
End: 2023-01-23

## 2023-01-23 NOTE — TELEPHONE ENCOUNTER
No call, encounter opened to process CT Lung Screening. CT Lung Screen: 1/22/2023    Impression   1. There is no pulmonary infiltrate, mass or suspicious pulmonary nodule. 2. Bilateral renal cysts   3. 1 cm calcified stone within the gallbladder lumen. There are no findings   of acute cholecystitis. LUNG RADS:   Per ACR Lung-RADS Version 1.1       Category 1, Negative (No nodules and definitely benign nodules). Management: Continue annual lung screening with LDCT in 12 months. RECOMMENDATIONS:   If you would like to register your patient with the [facility], please contact the Nurse Navigator at   [phone]. Pack years: 39    Social History     Tobacco Use  Smoking Status: Former Smoker    Start Date: 01/01/1970   Quit Date: 08/26/2021   Types: Cigarettes   Packs/Day: 1   Years: 39   Pack Years: 39   Smokeless Tobacco: Never         Results letter sent to patient via my chart or mailed.      [facility]

## 2023-01-23 NOTE — RESULT ENCOUNTER NOTE
CT negative for any suspicious nodules. There are bilateral kidney cysts which appear simple. There is also a gallstone, check if symptoms.   Follow-up as directed to review more detail Problem: Falls - Risk of  Goal: *Absence of Falls  Description  Document Matt Hayes Fall Risk and appropriate interventions in the flowsheet. Outcome: Progressing Towards Goal  Note:   Fall Risk Interventions:  Mobility Interventions: Patient to call before getting OOB         Medication Interventions: Teach patient to arise slowly, Patient to call before getting OOB    Elimination Interventions:  Toileting schedule/hourly rounds, Toilet paper/wipes in reach, Call light in reach, Patient to call for help with toileting needs

## 2023-02-13 ENCOUNTER — OFFICE VISIT (OUTPATIENT)
Dept: PRIMARY CARE CLINIC | Age: 73
End: 2023-02-13

## 2023-02-13 VITALS
WEIGHT: 200 LBS | OXYGEN SATURATION: 97 % | DIASTOLIC BLOOD PRESSURE: 66 MMHG | TEMPERATURE: 97.8 F | SYSTOLIC BLOOD PRESSURE: 130 MMHG | BODY MASS INDEX: 34.33 KG/M2

## 2023-02-13 DIAGNOSIS — I10 ESSENTIAL HYPERTENSION: ICD-10-CM

## 2023-02-13 DIAGNOSIS — F41.9 ANXIETY AND DEPRESSION: ICD-10-CM

## 2023-02-13 DIAGNOSIS — E87.5 HYPERKALEMIA: ICD-10-CM

## 2023-02-13 DIAGNOSIS — E55.9 VITAMIN D DEFICIENCY: ICD-10-CM

## 2023-02-13 DIAGNOSIS — N18.9 CHRONIC RENAL IMPAIRMENT, UNSPECIFIED CKD STAGE: ICD-10-CM

## 2023-02-13 DIAGNOSIS — Z72.0 TOBACCO USE: ICD-10-CM

## 2023-02-13 DIAGNOSIS — F32.A ANXIETY AND DEPRESSION: ICD-10-CM

## 2023-02-13 DIAGNOSIS — J44.9 CHRONIC OBSTRUCTIVE PULMONARY DISEASE, UNSPECIFIED COPD TYPE (HCC): ICD-10-CM

## 2023-02-13 DIAGNOSIS — E78.2 MIXED HYPERLIPIDEMIA: Primary | ICD-10-CM

## 2023-02-13 DIAGNOSIS — M1A.9XX0 CHRONIC GOUT WITHOUT TOPHUS, UNSPECIFIED CAUSE, UNSPECIFIED SITE: ICD-10-CM

## 2023-02-13 DIAGNOSIS — K21.9 GASTROESOPHAGEAL REFLUX DISEASE WITHOUT ESOPHAGITIS: ICD-10-CM

## 2023-02-13 DIAGNOSIS — E53.8 VITAMIN B12 DEFICIENCY: ICD-10-CM

## 2023-02-13 DIAGNOSIS — E83.52 HYPERCALCEMIA: ICD-10-CM

## 2023-02-13 DIAGNOSIS — E11.65 TYPE 2 DIABETES MELLITUS WITH HYPERGLYCEMIA, WITHOUT LONG-TERM CURRENT USE OF INSULIN (HCC): ICD-10-CM

## 2023-02-13 SDOH — ECONOMIC STABILITY: FOOD INSECURITY: WITHIN THE PAST 12 MONTHS, THE FOOD YOU BOUGHT JUST DIDN'T LAST AND YOU DIDN'T HAVE MONEY TO GET MORE.: NEVER TRUE

## 2023-02-13 SDOH — ECONOMIC STABILITY: INCOME INSECURITY: HOW HARD IS IT FOR YOU TO PAY FOR THE VERY BASICS LIKE FOOD, HOUSING, MEDICAL CARE, AND HEATING?: NOT HARD AT ALL

## 2023-02-13 SDOH — ECONOMIC STABILITY: HOUSING INSECURITY
IN THE LAST 12 MONTHS, WAS THERE A TIME WHEN YOU DID NOT HAVE A STEADY PLACE TO SLEEP OR SLEPT IN A SHELTER (INCLUDING NOW)?: NO

## 2023-02-13 SDOH — ECONOMIC STABILITY: FOOD INSECURITY: WITHIN THE PAST 12 MONTHS, YOU WORRIED THAT YOUR FOOD WOULD RUN OUT BEFORE YOU GOT MONEY TO BUY MORE.: NEVER TRUE

## 2023-02-13 NOTE — PROGRESS NOTES
Brenda Wall : 1950 Sex: male  Age: 81 y.o. Chief Complaint   Patient presents with    Discuss Labs    Results     Please review CT and US         HPI:      Patient presents today for follow-up. Feels very well now that he is back on his medication.   Ionized calcium up slightly 1.3 but total normalized glucose down 111 potassium normalized CMP otherwise normal uric acid down to 5.0 LDL went from 155-69 HDL up to 38 PTH normal 31    AAA ultrasound screen negative    LDCT showed no pulmonary infiltrate mass or suspicious pulmonary nodule, bilateral renal cyst that does not wish further E/T, 1 cm calcified stone gallbladder, asymptomatic, defers other E/T despite high risk in diabetic          Most Recent Labs  CBC  Lab Results   Component Value Date/Time    WBC 6.7 01/10/2023 10:25 AM    WBC 6.1 2022 12:11 PM    WBC 6.4 2022 11:23 AM    RBC 5.63 01/10/2023 10:25 AM    RBC 5.22 2022 12:11 PM    RBC 5.27 2022 11:23 AM    HGB 16.8 01/10/2023 10:25 AM    HGB 16.1 2022 12:11 PM    HGB 15.6 2022 11:23 AM    HCT 48.9 01/10/2023 10:25 AM    HCT 48.4 2022 12:11 PM    HCT 47.5 2022 11:23 AM    MCV 86.9 01/10/2023 10:25 AM    MCV 92.7 2022 12:11 PM    MCV 90.1 2022 11:23 AM     01/10/2023 10:25 AM     2022 12:11 PM     2022 11:23 AM      CMP  Lab Results   Component Value Date/Time     2023 08:54 AM     01/10/2023 10:25 AM     2022 12:11 PM    K 4.5 2023 08:54 AM    K 5.2 01/10/2023 10:25 AM    K 5.1 2022 12:11 PM     2023 08:54 AM     01/10/2023 10:25 AM     2022 12:11 PM    CO2 24 2023 08:54 AM    CO2 28 01/10/2023 10:25 AM    CO2 24 2022 12:11 PM    ANIONGAP 13 2023 08:54 AM    ANIONGAP 12 01/10/2023 10:25 AM    ANIONGAP 15 2022 12:11 PM    GLUCOSE 111 2023 08:54 AM    GLUCOSE 117 01/10/2023 10:25 AM    GLUCOSE 127 09/23/2022 12:11 PM    GLUCOSE 107 04/10/2012 11:24 AM    GLUCOSE 105 11/29/2011 10:30 AM    GLUCOSE 102 11/03/2011 10:45 AM    BUN 13 02/09/2023 08:54 AM    BUN 18 01/10/2023 10:25 AM    BUN 14 09/23/2022 12:11 PM    CREATININE 1.1 02/09/2023 08:54 AM    CREATININE 1.2 01/10/2023 10:25 AM    CREATININE 1.2 09/23/2022 12:11 PM    LABGLOM >60 02/09/2023 08:54 AM    LABGLOM >60 01/10/2023 10:25 AM    LABGLOM 60 09/23/2022 12:11 PM    LABGLOM >60 06/16/2022 11:23 AM    LABGLOM 60 03/17/2022 12:28 PM    GFRAA >60 09/23/2022 12:11 PM    GFRAA >60 06/16/2022 11:23 AM    GFRAA >60 03/17/2022 12:28 PM    CALCIUM 9.7 02/09/2023 08:54 AM    CALCIUM 10.6 01/10/2023 10:25 AM    CALCIUM 10.0 09/23/2022 12:11 PM    PROT 7.5 02/09/2023 08:54 AM    PROT 7.7 01/10/2023 10:25 AM    PROT 7.7 09/23/2022 12:11 PM    LABALBU 4.4 02/09/2023 08:54 AM    LABALBU 4.5 01/10/2023 10:25 AM    LABALBU 4.4 09/23/2022 12:11 PM    LABALBU 4.5 04/10/2012 11:24 AM    LABALBU 4.4 11/29/2011 10:30 AM    LABALBU 4.8 08/18/2011 01:15 PM    BILITOT 0.6 02/09/2023 08:54 AM    BILITOT 0.6 01/10/2023 10:25 AM    BILITOT 0.5 09/23/2022 12:11 PM    ALKPHOS 60 02/09/2023 08:54 AM    ALKPHOS 65 01/10/2023 10:25 AM    ALKPHOS 55 09/23/2022 12:11 PM    AST 31 02/09/2023 08:54 AM    AST 29 01/10/2023 10:25 AM    AST 27 09/23/2022 12:11 PM    ALT 33 02/09/2023 08:54 AM    ALT 29 01/10/2023 10:25 AM    ALT 29 09/23/2022 12:11 PM     A1C  Lab Results   Component Value Date/Time    LABA1C 6.1 01/10/2023 10:25 AM    LABA1C 6.1 09/23/2022 12:11 PM    LABA1C 5.8 06/16/2022 11:23 AM     TSH  Lab Results   Component Value Date/Time    TSH 2.340 01/10/2023 10:25 AM    TSH 1.060 09/23/2022 12:11 PM    TSH 1.900 06/16/2022 11:23 AM     FREET4  No results found for: [id number]  LIPID  Lab Results   Component Value Date/Time    CHOL 131 02/09/2023 08:54 AM    CHOL 243 01/10/2023 10:25 AM    CHOL 139 09/23/2022 12:11 PM    HDL 38 02/09/2023 08:54 AM    HDL 35 01/10/2023 10:25 AM HDL 35 09/23/2022 12:11 PM    LDLCALC 69 02/09/2023 08:54 AM    LDLCALC 155 01/10/2023 10:25 AM    LDLCALC 64 09/23/2022 12:11 PM    TRIG 119 02/09/2023 08:54 AM    TRIG 265 01/10/2023 10:25 AM    TRIG 202 09/23/2022 12:11 PM     VITAMIN D  Lab Results   Component Value Date/Time    VITD25 32 01/10/2023 10:25 AM    VITD25 41 09/23/2022 12:11 PM    VITD25 42 06/16/2022 11:23 AM     MAGNESIUM  Lab Results   Component Value Date/Time    MG 2.6 06/20/2011 03:18 AM    MG 2.3 06/11/2011 05:15 AM    MG 2.3 06/10/2011 04:10 AM      PHOS  Lab Results   Component Value Date/Time    PHOS 2.5 06/11/2011 05:15 AM    PHOS 4.1 06/10/2011 04:10 AM    PHOS 4.2 06/09/2011 03:10 AM      MARIA GUADALUPE   Lab Results   Component Value Date/Time    MARIA GUADALUPE NEGATIVE 08/30/2015 03:50 PM    MARIA GUADALUPE NEGATIVE 08/18/2011 01:15 PM    MARIA GUADALUPE NEGATIVE 06/06/2011 10:30 AM     RHEUMATOID FACTOR  Lab Results   Component Value Date/Time    RF <10 08/30/2015 03:50 PM    RF <4 08/18/2011 01:15 PM    RF <4 06/06/2011 10:30 AM     PSA  Lab Results   Component Value Date/Time    PSA 1.81 07/02/2021 10:57 AM    PSA 3.53 07/20/2020 12:08 PM    PSA 2.23 04/25/2019 09:22 AM      HEPATITIS C  Lab Results   Component Value Date/Time    HCVABI Non-Reactive 06/11/2018 08:46 AM     HIV  No results found for: XDQ4APE, HIV1QT  UA  Lab Results   Component Value Date/Time    COLORU Yellow 01/10/2023 10:25 AM    COLORU Yellow 09/23/2022 12:10 PM    COLORU Yellow 06/16/2022 11:24 AM    CLARITYU Clear 01/10/2023 10:25 AM    CLARITYU Clear 09/23/2022 12:10 PM    CLARITYU Clear 06/16/2022 11:24 AM    GLUCOSEU Negative 01/10/2023 10:25 AM    GLUCOSEU Negative 09/23/2022 12:10 PM    GLUCOSEU Negative 06/16/2022 11:24 AM    GLUCOSEU NEGATIVE 08/18/2011 01:05 PM    GLUCOSEU NEGATIVE 06/08/2011 05:30 PM    GLUCOSEU NEGATIVE 04/25/2011 11:30 AM    BILIRUBINUR Negative 01/10/2023 10:25 AM    BILIRUBINUR Negative 09/23/2022 12:10 PM    BILIRUBINUR Negative 06/16/2022 11:24 AM    BILIRUBINUR NEGATIVE 08/18/2011 01:05 PM    BILIRUBINUR NEGATIVE 06/08/2011 05:30 PM    BILIRUBINUR NEGATIVE 04/25/2011 11:30 AM    KETUA Negative 01/10/2023 10:25 AM    KETUA Negative 09/23/2022 12:10 PM    KETUA Negative 06/16/2022 11:24 AM    SPECGRAV 1.025 01/10/2023 10:25 AM    SPECGRAV 1.025 09/23/2022 12:10 PM    SPECGRAV 1.025 06/16/2022 11:24 AM    BLOODU TRACE-INTACT 01/10/2023 10:25 AM    BLOODU TRACE-INTACT 09/23/2022 12:10 PM    BLOODU Negative 06/16/2022 11:24 AM    PHUR 5.5 01/10/2023 10:25 AM    PHUR 6.0 09/23/2022 12:10 PM    PHUR 5.5 06/16/2022 11:24 AM    PROTEINU Negative 01/10/2023 10:25 AM    PROTEINU Negative 09/23/2022 12:10 PM    PROTEINU Negative 06/16/2022 11:24 AM    UROBILINOGEN 0.2 01/10/2023 10:25 AM    UROBILINOGEN 0.2 09/23/2022 12:10 PM    UROBILINOGEN 0.2 06/16/2022 11:24 AM    NITRU Negative 01/10/2023 10:25 AM    NITRU Negative 09/23/2022 12:10 PM    NITRU Negative 06/16/2022 11:24 AM    LEUKOCYTESUR Negative 01/10/2023 10:25 AM    LEUKOCYTESUR TRACE 09/23/2022 12:10 PM    LEUKOCYTESUR Negative 06/16/2022 11:24 AM     Urine Micro/Albumin Ratio  Lab Results   Component Value Date/Time    MALBCR 10.0 01/10/2023 10:25 AM    MALBCR 7.7 09/23/2022 12:10 PM    MALBCR 10.1 06/16/2022 11:24 AM       ROS:  Const: Denies chills, fever, malaise and sweats. Eyes: Denies discharge, pain, redness and, chronic left visual disturbance  ENMT: Denies earaches, other ear symptoms. Denies nasal or sinus symptoms other than stated  above. Denies mouth and tongue lesions and sore throat. CV: Denies chest discomfort, pain; diaphoresis, dizziness, edema, lightheadedness, orthopnea,  palpitations, syncope and near syncopal episode or any exertional symptoms  Resp: Denies cough, hemoptysis, pleuritic pain, SOB, sputum production and wheezing. GI: Denies abdominal pain, reflux symptoms change in bowel habits, hematochezia, melena, nausea and vomiting.     : Denies urinary symptoms including dysuria , urgency, frequency or hematuria. Musculo: Chronic low back pain with limited range of motion, chronic knee pain. OA. Skin: Denies bruising and rash. Neuro: Denies headache, numbness, stiff neck, tingling and focal weakness slurred speech or facial  droop  Hema/Lymph: Denies bleeding/bruising tendency and enlarged lymph nodes        Current Outpatient Medications:     tiotropium (SPIRIVA) 18 MCG inhalation capsule, Inhale 1 capsule into the lungs daily, Disp: 30 capsule, Rfl: 12    albuterol sulfate HFA (PROVENTIL;VENTOLIN;PROAIR) 108 (90 Base) MCG/ACT inhaler, 1-2 puffs q4-6hrs prn, Disp: 1 each, Rfl: 3    rosuvastatin (CRESTOR) 10 MG tablet, Take 1 tablet by mouth daily, Disp: 90 tablet, Rfl: 3    allopurinol (ZYLOPRIM) 300 MG tablet, Take 1 tablet by mouth daily, Disp: 90 tablet, Rfl: 1    omeprazole (PRILOSEC) 20 MG delayed release capsule, Take 1 capsule by mouth daily Ideally 1hr prior to dinner, Disp: 90 capsule, Rfl: 3    vitamin D (ERGOCALCIFEROL) 1.25 MG (96729 UT) CAPS capsule, Take 1 capsule by mouth once a week, Disp: 12 capsule, Rfl: 3    metoprolol succinate (TOPROL XL) 25 MG extended release tablet, Take 0.5 tablets by mouth daily, Disp: 45 tablet, Rfl: 3    FLUoxetine (PROZAC) 20 MG capsule, Take 1 capsule by mouth daily, Disp: 90 capsule, Rfl: 3    Multiple Vitamin (MULTIVITAMIN PO), Take by mouth daily, Disp: , Rfl:     colchicine (COLCRYS) 0.6 MG tablet, Two tablets by mouth x 1 at first sign of gout, then one tablet one hour later, Disp: 30 tablet, Rfl: 0    aspirin 81 MG EC tablet, Take 81 mg by mouth daily. , Disp: , Rfl:   No Known Allergies    Past Medical History:   Diagnosis Date    CAD (coronary artery disease)     COPD (chronic obstructive pulmonary disease) ([facility] [address]     Hyperlipidemia     Hypertension      Past Surgical History:   Procedure Laterality Date    CORONARY ARTERY BYPASS GRAFT  06/09/11    ACB X 5-  [address] CATH LAB PROCEDURE  06/08/11    DR. VÁSQUEZ    TONSILLECTOMY AND ADENOIDECTOMY       Family History   Problem Relation Age of Onset    Thyroid Disease Mother     Heart Attack Father 61    Hypertension Father     Hypertension Sister     Heart Surgery Sister     Cirrhosis Brother     Hypertension Sister     Heart Surgery Sister     Heart Surgery Sister     Hypertension Sister      Social History     Tobacco Use    Smoking status: Former     Packs/day: 1.00     Years: 45.00     Pack years: 45.00     Types: Cigarettes     Start date: 1970     Quit date: 2021     Years since quittin.4    Smokeless tobacco: Never   Vaping Use    Vaping Use: Never used   Substance Use Topics    Alcohol use: Yes     Comment: occassionally on holidays    Drug use: No      Social History     Social History Narrative        PMH:    Problem List: 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 FH:    Father:     age 61 - MI - hypertension. Mother:     age 66 - post-op complications - thyroid, reflux - post op after hip fx (septic). Brother -  52's cirrhosis from alcoholism    Brother (twin identical) htn (he follows with physicians regularly)    Sisters x 3 - all with HTN and 1 with CABG age 71 (stent 76)    Reviewed, no changes. SH:    Marital: Legal Status: . Previously Worked as , asbestos exposure, follows with work physicians    Personal Habits: Cigarette Use: Former Cigarette Smoker - quit ., now smoking again as of . Alcohol: Denies alcohol use. FH:  Father:   age 61 - MI - hypertension.   Mother:   age 66 - post-op complications - thyroid, reflux - post op after hip fx (septic). Brother -  52's cirrhosis from alcoholism  Brother (twin identical) htn (he follows with physicians regularly)  Sisters x 3 - all with HTN and 1 with CABG age 71 (stent 76)                  Vitals:    23 1445   BP: 130/66   Temp: 97.8 °F (36.6 °C)   SpO2: 97%   Weight: 200 lb (90.7 kg)      Wt Readings from Last 3 Encounters:   23 200 lb (90.7 kg)   23 196 lb 2 oz (89 kg)   22 204 lb (92.5 kg)        Physical Exam  Exam:  Const: Appears comfortable. No signs of acute distress present. Head/Face: Atraumatic on inspection. Eyes: EOMI in both eyes. No discharge from the eyes. PERRL. Sclerae clear. ENMT: Auditory canals normal. Tympanic membranes: intact and translucent. External nose WNL. Nasal mucosa is clear. Oropharynx: No erythema or exudate. Posterior pharynx is normal.  Neck: Supple. Palpation reveals no adenopathy. No masses appreciated. No JVD. Carotids: no  bruits. Resp: Respirations are unlabored. Clear to auscultation. No rales, rhonchi or wheezes appreciated  over the lungs bilaterally. CV: Rate is regular. Rhythm is regular. No gallop or rubs. No heart murmur appreciated. Extremities: No clubbing, cyanosis, or edema. No calf inflammation or tenderness. Abdomen: Bowel sounds are normoactive. Abdomen is soft, nontender, and nondistended. No  abdominal masses. No palpable hepatosplenomegaly. Lymph: No palpable or visible regional lymphadenopathy. Musculoskeletal: no acute joint inflammation except chronic low back pain with limited range of motion. . Crepitus bilateral knees  Skin: Dry and warm with no rash. Skin normal to inspection and palpation overall. Neuro: Alert and oriented. Affect: appropriate. Upper Extremities: 5/5 bilaterally. Lower Extremities:  5/5 bilaterally. Sensation intact to light touch. Reflexes: DTR's are symmetric and 2+ bilaterally. .  Cranial Nerves: Cranial nerves grossly intact.    Genital/rectal-defers      Assessment and Plan: Diagnosis Orders   1. Mixed hyperlipidemia  CK    Comprehensive Metabolic Panel    Lipid Panel      2. Type 2 diabetes mellitus with hyperglycemia, without long-term current use of insulin (HCC)  CBC with Auto Differential    Hemoglobin A1C    Urinalysis    Microalbumin / Creatinine Urine Ratio    TSH      3. Hypercalcemia        4. Hyperkalemia        5. Chronic obstructive pulmonary disease, unspecified COPD type ([facility] [address]        6. Chronic gout without tophus, unspecified cause, unspecified site  Uric Acid      7. Anxiety and depression        8. Vitamin D deficiency  Vitamin D 25 Hydroxy      9. Gastroesophageal reflux disease without esophagitis        10. Essential hypertension  TSH      11. Chronic renal impairment, unspecified CKD stage        12. Vitamin B12 deficiency  Vitamin B12 & Folate      13. Tobacco use            No problem-specific Assessment & Plan notes found for this encounter. AAA screen  Smoking cessation emphasized. Ultrasound 2/23 negative    Anxiety and depression  With increased family stressors. We had previously referred Dr. Tosin Waller and I encouraged him to call, he has not yet. Rec mended and he is planning to start counseling as well and to have a counselor. Adamantly denies SI/HI. He is feeling much better on higher dose fluoxetine with  standard precautions reviewed including prolonged QT SIADH paradoxical reaction etc. now on 30 mg daily. Insomnia, unspecified type  Counseled, benefiting from melatonin, risk benefits reviewed, has benefited from increased fluoxetine as above. H. pylori infection    Counseled extensively. Differential reviewed, including serious etiologies. symptoms resolved after triple therapy. He was following with Dr. Nghia Mckeon and planning EGD/colonoscopy November 2022 but since canceled does not wish to follow-up now.       Gastro-esophageal reflux disease without esophagitis  Care Plan:  Comments : States long-term symptoms, resolved with omeprazole, 6/22 tested positive for H. pylori, since treated and feeling better. Differential of causes and consequences reviewed. Now on omeprazole with standard precautions including RI/electrolyte imbalance. Was following with Dr. Sasha Beltrán and planning November EGD/colonoscopy [phone] but does not wish further E/T now, asymptomatic. Used to take Pepcid. Cholelithiasis  Incidentally noted on LDCT. Ultrasound March/2022 again showed several small gallstones mild fatty liver, asymptomatic now. Gallstone again noted on ct chest 2/23, asx. Higher risk in diabetics reviewed. Previously referred to dr Matty Ruth, not interested in further e/t now. Of not brother had GB surgery age [de-identified]. encouraged small frequent meals, low-fat diet, notify if symptoms    Colon cancer screening  Was planning colonoscopy November 2022 but now refusing. Still awaiting fit test       Hypertension  Care Plan:  Comments :  History of whitecoat hypertension,excellent at home. Tolerating therapy. Risk of HTN reviewed. lifestyle modification emphasized. hyper and hypotensive precautions reviewed pulse parameters also  reviewed. Declines ACE inhibitor or ARB. Other hyperlipidemia  Care Plan:  Comments :   he was tolerating and doing very well with Crestor but stopped it and LDL went up significantly. He is going to resume it. Dose-dependent benefits reviewed goals reviewed. .SE's/Instructions/Risks/Benefits reviewed. If  ADR's, D/C and notify. The risks  and benefits of Vitamin D3 and Coenzyme Q-10 supplementation reviewed. risk of hyperlipidemia reviewed lifestyle modification reviewed. Goals reviewed. Declines change in therapy. Previously on Lipitor but unaffordable. Goals reviewed     Liver disease, unspecified  Care Plan:  Comments : Counseled extensively. Differential reviewed, including serious etiologies. Likely fatty liver. Precautions reviewed. Lifestyle modification appropriate diet  and weight loss reviewed.  Risks of even this reviewed. Other than basic monitoring on interested in other evaluation or treatment,  in-depth blood work, imaging or otherwise. Hepatitis C screen negative. Defers  further evaluation or treatment otherwise. LFTs now normal     Hematuria, unspecified  Care Plan:  Comments : Counseled extensively. Differential reviewed, including serious etiologies. Asymptomatic. Continue per Dr. Dean Bence . higher risk given  tobacco use reviewed. Although I have encouraged him to follow-up with Dr. Dean Bence but, he is not interested now. Marisol Wynn His wife states several family hours also have this condition. Does not wish other E/T. Encounter for general adult medical examination with bnormal  findings  Care Plan:  Comments : Health maintenance issues discussed at length 8/11/2021 encouraged yearly  Physicals. Atherosclerotic heart disease of native coronary artery without angina  pectoris  Care Plan:  Comments : Status post CABG. asx. encouraged fu w/ cardiologist, previously dr Martha Gonzalez,  declines. currently off ACE inhibitor/ARB and declines at this time. Continue per cardiology. Stress test 8/21 was low risk      Type 2 diabetes mellitus with hyperglycemia  Care Plan:  Comments :  extensively. watch BS closely ambulatory. Parameters given. Call if not  in range. ER if dangerous numbers. Macro and microvascular complications  reviewed. Importance of at least yearly eye exams and daily foot exams  reviewed. Risks and benefits of insulin sensitizers reviewed and he declines  now.   hemoglobin A1c has remained relatively stable 5.7-5.5-5.6 -5.4-5.7-5.8-6.1-6.1. Gout, unspecified  Care Plan:  Comments :  Continue to emphasized low uric acid diet. Proper hydration reviewed. He has colcrys prn,2×1 at onset of  symptoms and repeat one hour later. .  reviewed. Uric acid goals reviewed. Was at goal with allopurinol, 5.8-6.0-up to 9.1 after stopping allopurinol.   Resume 300 mg, now 5.0    Chronic obstructive pulmonary disease, unspecified  Care Plan:  Comments : Asymptomatic.  Of the CT 7/21 - for nodules.  Defers pulmonary Function Tests, referral or otherwise.  Currently on  Spiriva and p.r.n. Proair which he rarely needs although a little more in the fall.  Last LDCT 2/23      Ntervertebral disc disorders with myelopathy, lumbar region  Care Plan:   Status post injections through [facility], then surgery with  discectomy/laminectomy through Dr. Pena. Continue per them. h/o PT     Vitamin D deficiency, unspecified  Care Plan:  Comments : Stable on prescription vitamin D,, continue monitoring     Disorder of prostate, unspecified  Care Plan:  Comments : Counseled extensively. Differential reviewed, including serious etiologies.rising  PSA is trending down from 2.6-2.2-3.53-1.81.  He should follow-up with Dr. Shelley, deferring now       Secondary polycythemia  Care Plan:  Comments : Likely related to smoking, other differential reviewed. Monitor. Declines further  eval/tx. fluctuates    Dysthymic disorder  Care Plan:  Comments : Stable. Continue current therapy as above. r/b meds reviewed.        CRI-  Counseled. Proper hydration. Avoid nephrotoxic agents monitor. Declines imaging or referral creatinine stable, currently normal         Tobacco abuse-counseled extensively on importance of abstinence.  States he gets chest x-ray and PFTs at work but, defers REF. states he had cut back somewhat, thinking about stopping, declines assistance, last LDCT 2/23    Bilateral knee OA  Failed conservative measures.  Continue per [facility]  Topicals reviewed.  R/B Tylenol reviewed.  Avoidance of NSAID recommendations reviewed    Obesity  Counseled.  Risk reviewed.  Lifestyle modification emphasized.  Counseled on Mediterranean diet/weight watchers.  Declines formal invention.    B12 deficiency  History of.  Risk of low and high reviewed.  Check level.  Appropriate supplementation  reviewed    Hyperkalemia  Mild, fluctuates. counseled on possible causes and consequences. Counseled on TP versus FP . Encourage avoiding excessive intake, encourage proper hydration, avoid supplements. Last check normal  Hypercalcemia  Counseled extensively. Differential reviewed, including serious etiologies. Repeat normal although INR is mildly elevated, PTH normal.  Potential causes of and risk of reviewed. Emphasize proper hydration avoid excess intake or supplement. Simply wants total recheck 3 months          Plan as above. Counseled extensively and differential diagnoses relevant to above were reviewed, including serious etiologies, risks and complications, especially of left uncontrolled. If relevant, instructions and  alternatives to meds/treatment reviewed, as well as interactions, and  SE's/ADRs reviewed, notify immediately if any, discontinuing new meds if any. Plan made after discussion and shared decision making. No flowsheet data found. Counseled. Continue meds. Continue per specialist.  Otherwise simply wants blood work and follow-up in 3 months sooner as needed        As long as symptoms steadily improve/resolve, and medical conditions follow the expected course, FU as below, sooner PRN. Return in about 3 months (around 5/13/2023), or if symptoms worsen or fail to improve. Educational materials and/or home exercises printed for patient's review and were included in patient instructions on his/her After Visit Summary and given to patient at the end of visit. After discussion, patient and/or guardian verbalizes understanding, agrees, feels comfortable with and wishes to proceed with above treatment plan. Call for any pending results, FU sooner if abnormal, as needed or if any current symptoms persist/worsen.       Advised patient to call with any new medication issues, and read all Rx info from pharmacy to assure aware of all possible risks and side effects of medication before taking. Reviewed age and gender appropriate health screening exams and vaccinations. Advised patient regarding importance of keeping up with recommended health maintenance and to schedule as soon as possible if overdue, as this is important in assessing for undiagnosed pathology, especially cancer, as well as protecting against potentially harmful/life threatening disease. Patient and/or guardian verbalizes understanding and agrees with above counseling, assessment and plan. All questions answered. Signs and symptoms to watch for discussed, serious signs and symptoms reviewed. ER if any. Over 30 minutes  spent with the patient in reviewing records, reviewing with patient/family, counseling, ordering,  prescribing, completing h&p, etc., with over 50% of the time spent face to face counseling. Claudell Aguas, MD    Patients are advised to check with insurance company to ensure coverage and to fully understand benefits and cost prior to any testing. This note was created with the assistance of voice recognition software. Document was reviewed however may contain grammatical errors. Discussed with the patient the current USPSTF guidelines released March 9, 2021 for screening for lung cancer. For adults aged 48 to [de-identified] years who have a 20 pack-year smoking history and currently smoke or have quit within the past 15 years the grade B recommendation is to:  Screen for lung cancer with low-dose computed tomography (LDCT) every year. Stop screening once a person has not smoked for 15 years or has a health problem that limits life expectancy or the ability to have lung surgery. The patient  reports that he quit smoking about 17 months ago. His smoking use included cigarettes. He started smoking about 53 years ago. He has a 45.00 pack-year smoking history.  He has never used smokeless tobacco.. Discussed with patient the risks and benefits of screening, including over-diagnosis, false positive rate, and total radiation exposure. The patient currently exhibits no signs or symptoms suggestive of lung cancer. Discussed with patient the importance of compliance with yearly annual lung cancer screenings and willingness to undergo diagnosis and treatment if screening scan is positive. In addition, the patient was counseled regarding the importance of remaining smoke free and/or total smoking cessation.     Also reviewed the following if the patient has Medicare that as of February 10, 2022, Medicare only covers LDCT screening in patients aged 51-72 with at least a 20 pack-year smoking history who currently smoke or have quit in the last 15 years

## 2023-05-18 DIAGNOSIS — E55.9 VITAMIN D DEFICIENCY: ICD-10-CM

## 2023-05-18 DIAGNOSIS — I10 ESSENTIAL HYPERTENSION: ICD-10-CM

## 2023-05-18 DIAGNOSIS — M1A.9XX0 CHRONIC GOUT WITHOUT TOPHUS, UNSPECIFIED CAUSE, UNSPECIFIED SITE: ICD-10-CM

## 2023-05-18 DIAGNOSIS — E11.65 TYPE 2 DIABETES MELLITUS WITH HYPERGLYCEMIA, WITHOUT LONG-TERM CURRENT USE OF INSULIN (HCC): ICD-10-CM

## 2023-05-18 DIAGNOSIS — E53.8 VITAMIN B12 DEFICIENCY: ICD-10-CM

## 2023-05-18 DIAGNOSIS — E78.2 MIXED HYPERLIPIDEMIA: ICD-10-CM

## 2023-05-18 LAB
ALBUMIN SERPL-MCNC: 4.4 G/DL (ref 3.5–5.2)
ALP SERPL-CCNC: 63 U/L (ref 40–129)
ALT SERPL-CCNC: 34 U/L (ref 0–40)
ANION GAP SERPL CALCULATED.3IONS-SCNC: 15 MMOL/L (ref 7–16)
AST SERPL-CCNC: 32 U/L (ref 0–39)
BILIRUB SERPL-MCNC: 0.4 MG/DL (ref 0–1.2)
BUN SERPL-MCNC: 16 MG/DL (ref 6–23)
CALCIUM SERPL-MCNC: 10.1 MG/DL (ref 8.6–10.2)
CHLORIDE SERPL-SCNC: 108 MMOL/L (ref 98–107)
CHOLESTEROL, TOTAL: 139 MG/DL (ref 0–199)
CO2 SERPL-SCNC: 25 MMOL/L (ref 22–29)
CREAT SERPL-MCNC: 1.2 MG/DL (ref 0.7–1.2)
FOLATE SERPL-MCNC: >20 NG/ML (ref 4.8–24.2)
GLUCOSE SERPL-MCNC: 128 MG/DL (ref 74–99)
HBA1C MFR BLD: 6 % (ref 4–5.6)
HDLC SERPL-MCNC: 40 MG/DL
LDLC SERPL CALC-MCNC: 71 MG/DL (ref 0–99)
POTASSIUM SERPL-SCNC: 4.7 MMOL/L (ref 3.5–5)
PROT SERPL-MCNC: 7.5 G/DL (ref 6.4–8.3)
SODIUM SERPL-SCNC: 148 MMOL/L (ref 132–146)
TRIGL SERPL-MCNC: 138 MG/DL (ref 0–149)
TSH SERPL-MCNC: 2.26 UIU/ML (ref 0.27–4.2)
URATE SERPL-MCNC: 5.7 MG/DL (ref 3.4–7)
VIT B12 SERPL-MCNC: 578 PG/ML (ref 211–946)
VITAMIN D 25-HYDROXY: 30 NG/ML (ref 30–100)
VLDLC SERPL CALC-MCNC: 28 MG/DL

## 2023-05-22 ENCOUNTER — OFFICE VISIT (OUTPATIENT)
Dept: PRIMARY CARE CLINIC | Age: 73
End: 2023-05-22
Payer: MEDICARE

## 2023-05-22 VITALS
HEART RATE: 61 BPM | BODY MASS INDEX: 35.36 KG/M2 | OXYGEN SATURATION: 96 % | WEIGHT: 206 LBS | TEMPERATURE: 97.4 F | SYSTOLIC BLOOD PRESSURE: 134 MMHG | DIASTOLIC BLOOD PRESSURE: 70 MMHG

## 2023-05-22 VITALS — BODY MASS INDEX: 34.33 KG/M2 | HEIGHT: 64 IN

## 2023-05-22 DIAGNOSIS — N18.9 CHRONIC RENAL IMPAIRMENT, UNSPECIFIED CKD STAGE: ICD-10-CM

## 2023-05-22 DIAGNOSIS — L60.3 NAIL DYSTROPHY: ICD-10-CM

## 2023-05-22 DIAGNOSIS — Z00.00 MEDICARE ANNUAL WELLNESS VISIT, SUBSEQUENT: Primary | ICD-10-CM

## 2023-05-22 DIAGNOSIS — J44.9 CHRONIC OBSTRUCTIVE PULMONARY DISEASE, UNSPECIFIED COPD TYPE (HCC): ICD-10-CM

## 2023-05-22 DIAGNOSIS — M1A.9XX0 CHRONIC GOUT WITHOUT TOPHUS, UNSPECIFIED CAUSE, UNSPECIFIED SITE: ICD-10-CM

## 2023-05-22 DIAGNOSIS — L60.0 INGROWN NAIL OF GREAT TOE: Primary | ICD-10-CM

## 2023-05-22 DIAGNOSIS — N42.9 PROSTATE DISORDER: ICD-10-CM

## 2023-05-22 DIAGNOSIS — Z72.0 TOBACCO USE: ICD-10-CM

## 2023-05-22 DIAGNOSIS — E53.8 VITAMIN B12 DEFICIENCY: ICD-10-CM

## 2023-05-22 DIAGNOSIS — E55.9 VITAMIN D DEFICIENCY: ICD-10-CM

## 2023-05-22 DIAGNOSIS — F32.A ANXIETY AND DEPRESSION: ICD-10-CM

## 2023-05-22 DIAGNOSIS — R73.03 PREDIABETES: ICD-10-CM

## 2023-05-22 DIAGNOSIS — F41.9 ANXIETY AND DEPRESSION: ICD-10-CM

## 2023-05-22 DIAGNOSIS — E78.2 MIXED HYPERLIPIDEMIA: ICD-10-CM

## 2023-05-22 DIAGNOSIS — I10 ESSENTIAL HYPERTENSION: ICD-10-CM

## 2023-05-22 DIAGNOSIS — E87.0 HYPERNATREMIA: ICD-10-CM

## 2023-05-22 DIAGNOSIS — K21.9 GASTROESOPHAGEAL REFLUX DISEASE WITHOUT ESOPHAGITIS: ICD-10-CM

## 2023-05-22 DIAGNOSIS — Z12.11 COLON CANCER SCREENING: ICD-10-CM

## 2023-05-22 PROCEDURE — 3078F DIAST BP <80 MM HG: CPT | Performed by: FAMILY MEDICINE

## 2023-05-22 PROCEDURE — G0439 PPPS, SUBSEQ VISIT: HCPCS | Performed by: FAMILY MEDICINE

## 2023-05-22 PROCEDURE — G8427 DOCREV CUR MEDS BY ELIG CLIN: HCPCS | Performed by: FAMILY MEDICINE

## 2023-05-22 PROCEDURE — 1036F TOBACCO NON-USER: CPT | Performed by: FAMILY MEDICINE

## 2023-05-22 PROCEDURE — 1123F ACP DISCUSS/DSCN MKR DOCD: CPT | Performed by: FAMILY MEDICINE

## 2023-05-22 PROCEDURE — 3075F SYST BP GE 130 - 139MM HG: CPT | Performed by: FAMILY MEDICINE

## 2023-05-22 PROCEDURE — G8417 CALC BMI ABV UP PARAM F/U: HCPCS | Performed by: FAMILY MEDICINE

## 2023-05-22 PROCEDURE — 3017F COLORECTAL CA SCREEN DOC REV: CPT | Performed by: FAMILY MEDICINE

## 2023-05-22 PROCEDURE — 99214 OFFICE O/P EST MOD 30 MIN: CPT | Performed by: FAMILY MEDICINE

## 2023-05-22 PROCEDURE — 3023F SPIROM DOC REV: CPT | Performed by: FAMILY MEDICINE

## 2023-05-22 ASSESSMENT — PATIENT HEALTH QUESTIONNAIRE - PHQ9
SUM OF ALL RESPONSES TO PHQ QUESTIONS 1-9: 0
SUM OF ALL RESPONSES TO PHQ QUESTIONS 1-9: 0
3. TROUBLE FALLING OR STAYING ASLEEP: 0
SUM OF ALL RESPONSES TO PHQ QUESTIONS 1-9: 0
4. FEELING TIRED OR HAVING LITTLE ENERGY: 0
10. IF YOU CHECKED OFF ANY PROBLEMS, HOW DIFFICULT HAVE THESE PROBLEMS MADE IT FOR YOU TO DO YOUR WORK, TAKE CARE OF THINGS AT HOME, OR GET ALONG WITH OTHER PEOPLE: 0
1. LITTLE INTEREST OR PLEASURE IN DOING THINGS: 0
8. MOVING OR SPEAKING SO SLOWLY THAT OTHER PEOPLE COULD HAVE NOTICED. OR THE OPPOSITE, BEING SO FIGETY OR RESTLESS THAT YOU HAVE BEEN MOVING AROUND A LOT MORE THAN USUAL: 0
SUM OF ALL RESPONSES TO PHQ9 QUESTIONS 1 & 2: 0
5. POOR APPETITE OR OVEREATING: 0
SUM OF ALL RESPONSES TO PHQ QUESTIONS 1-9: 0
9. THOUGHTS THAT YOU WOULD BE BETTER OFF DEAD, OR OF HURTING YOURSELF: 0
7. TROUBLE CONCENTRATING ON THINGS, SUCH AS READING THE NEWSPAPER OR WATCHING TELEVISION: 0
6. FEELING BAD ABOUT YOURSELF - OR THAT YOU ARE A FAILURE OR HAVE LET YOURSELF OR YOUR FAMILY DOWN: 0
2. FEELING DOWN, DEPRESSED OR HOPELESS: 0

## 2023-05-22 ASSESSMENT — LIFESTYLE VARIABLES
HAVE YOU OR SOMEONE ELSE BEEN INJURED AS A RESULT OF YOUR DRINKING: 0
HOW OFTEN DURING THE LAST YEAR HAVE YOU NEEDED AN ALCOHOLIC DRINK FIRST THING IN THE MORNING TO GET YOURSELF GOING AFTER A NIGHT OF HEAVY DRINKING: 0
HOW OFTEN DURING THE LAST YEAR HAVE YOU HAD A FEELING OF GUILT OR REMORSE AFTER DRINKING: 0
HOW OFTEN DURING THE LAST YEAR HAVE YOU FAILED TO DO WHAT WAS NORMALLY EXPECTED FROM YOU BECAUSE OF DRINKING: 0
HOW OFTEN DO YOU HAVE A DRINK CONTAINING ALCOHOL: MONTHLY OR LESS
HOW MANY STANDARD DRINKS CONTAINING ALCOHOL DO YOU HAVE ON A TYPICAL DAY: 7 TO 9
HOW OFTEN DURING THE LAST YEAR HAVE YOU BEEN UNABLE TO REMEMBER WHAT HAPPENED THE NIGHT BEFORE BECAUSE YOU HAD BEEN DRINKING: 0
HAS A RELATIVE, FRIEND, DOCTOR, OR ANOTHER HEALTH PROFESSIONAL EXPRESSED CONCERN ABOUT YOUR DRINKING OR SUGGESTED YOU CUT DOWN: 0
HOW OFTEN DURING THE LAST YEAR HAVE YOU FOUND THAT YOU WERE NOT ABLE TO STOP DRINKING ONCE YOU HAD STARTED: 0

## 2023-05-22 NOTE — PATIENT INSTRUCTIONS

## 2023-05-22 NOTE — PROGRESS NOTES
Medicare Annual Wellness Visit    Neena Livingston is here for Medicare AW    Assessment & Plan   Assessment and Plan:   Diagnosis Orders   1. Medicare annual wellness visit, subsequent        2. Colon cancer screening  POCT Fecal Immunochemical Test (FIT)    Cologuard (Fecal DNA Colorectal Cancer Screening)             No problem-specific Assessment & Plan notes found for this encounter. Plan as above. Counseled extensively and differential diagnoses relevant to above were reviewed, including serious etiologies. Side effects and interactions of medications were reviewed. Health maintenance issues discussed at length as above, 5/22/2023 . Encouraged yearly physicals. As long as symptoms steadily improve/resolve, and medical conditions follow the expected course, FU as below, as previously directed and sooner PRN. Return for Medicare Annual Wellness Visit in 1 year. Signs and symptoms to watch for discussed, serious signs and symptoms reviewed. ER if any. Max Sheth MD    Patients are advised to check with insurance company to ensure coverage and to fully understand benefits and cost prior to any testing. This note was created with the assistance of voice recognition software. Document was reviewed however may contain grammatical errors. Recommendations for Preventive Services Due: see orders and patient instructions/AVS.  Recommended screening schedule for the next 5-10 years is provided to the patient in written form: see Patient Instructions/AVS.     Return for Medicare Annual Wellness Visit in 1 year. Subjective   The following acute and/or chronic problems were also addressed today:  See other notes         Health Maintenance: Well balanced/proper diet reviewed. Counseled on MVI, fiber, b-complex,  calcium and fish oils (including pros/cons of OTC vs Rx). Exercise recommendations reviewed.   Reviewed recommendations regarding covid x 5; Td (Tdap) (10/13;

## 2023-05-22 NOTE — PROGRESS NOTES
Dre Wilson : 1950 Sex: male  Age: 81 y.o. Chief Complaint   Patient presents with    3 Month Follow-Up    Discuss Labs    Nail Problem     Ingrown nail right great toe          HPI:      Patient presents today for follow-up, ingrown nail. Obey Bell He removed his nail corner. Tender. No purulence. No fever or chills. Continues to smoke. Counseled against.    Otherwise feels well, blood work reviewed, sodium chloride after high salt diet mildly elevated at 148/108. He was going to repeat today but then defers for 3 months.   Low-salt diet proper hydration reviewed folic acid greater than 20 glucose 128 HDL 40 LDL 71 triglyceride 138 hemoglobin A1c 6.0 TSH 2.260 vitamin D 30, lab missed CBC urinalysis micro defers for 3 months      Most Recent Labs  CBC  Lab Results   Component Value Date/Time    WBC 6.7 01/10/2023 10:25 AM    WBC 6.1 2022 12:11 PM    WBC 6.4 2022 11:23 AM    RBC 5.63 01/10/2023 10:25 AM    RBC 5.22 2022 12:11 PM    RBC 5.27 2022 11:23 AM    HGB 16.8 01/10/2023 10:25 AM    HGB 16.1 2022 12:11 PM    HGB 15.6 2022 11:23 AM    HCT 48.9 01/10/2023 10:25 AM    HCT 48.4 2022 12:11 PM    HCT 47.5 2022 11:23 AM    MCV 86.9 01/10/2023 10:25 AM    MCV 92.7 2022 12:11 PM    MCV 90.1 2022 11:23 AM     01/10/2023 10:25 AM     2022 12:11 PM     2022 11:23 AM      CMP  Lab Results   Component Value Date/Time     2023 10:08 AM     2023 08:54 AM     01/10/2023 10:25 AM    K 4.7 2023 10:08 AM    K 4.5 2023 08:54 AM    K 5.2 01/10/2023 10:25 AM     2023 10:08 AM     2023 08:54 AM     01/10/2023 10:25 AM    CO2 25 2023 10:08 AM    CO2 24 2023 08:54 AM    CO2 28 01/10/2023 10:25 AM    ANIONGAP 15 2023 10:08 AM    ANIONGAP 13 2023 08:54 AM    ANIONGAP 12 01/10/2023 10:25 AM    GLUCOSE 128 2023 10:08 AM    GLUCOSE

## 2023-08-11 DIAGNOSIS — I10 ESSENTIAL HYPERTENSION: ICD-10-CM

## 2023-08-14 RX ORDER — METOPROLOL SUCCINATE 25 MG/1
12.5 TABLET, EXTENDED RELEASE ORAL DAILY
Qty: 45 TABLET | Refills: 0 | Status: SHIPPED | OUTPATIENT
Start: 2023-08-14

## 2023-08-17 DIAGNOSIS — M1A.9XX0 CHRONIC GOUT WITHOUT TOPHUS, UNSPECIFIED CAUSE, UNSPECIFIED SITE: ICD-10-CM

## 2023-08-17 DIAGNOSIS — E78.2 MIXED HYPERLIPIDEMIA: ICD-10-CM

## 2023-08-17 DIAGNOSIS — E53.8 VITAMIN B12 DEFICIENCY: ICD-10-CM

## 2023-08-17 DIAGNOSIS — E55.9 VITAMIN D DEFICIENCY: ICD-10-CM

## 2023-08-17 DIAGNOSIS — R73.03 PREDIABETES: ICD-10-CM

## 2023-08-17 DIAGNOSIS — N42.9 PROSTATE DISORDER: ICD-10-CM

## 2023-08-17 LAB
ABSOLUTE IMMATURE GRANULOCYTE: 0.04 K/UL (ref 0–0.58)
BASOPHILS ABSOLUTE: 0.06 K/UL (ref 0–0.2)
BASOPHILS RELATIVE PERCENT: 1 % (ref 0–2)
BILIRUBIN URINE: NEGATIVE
COLOR: YELLOW
COMMENT: NORMAL
EOSINOPHILS ABSOLUTE: 0.36 K/UL (ref 0.05–0.5)
EOSINOPHILS RELATIVE PERCENT: 5 % (ref 0–6)
FOLATE: 12.7 NG/ML (ref 4.8–24.2)
GLUCOSE URINE: NEGATIVE MG/DL
HCT VFR BLD CALC: 48.5 % (ref 37–54)
HEMOGLOBIN: 16.4 G/DL (ref 12.5–16.5)
IMMATURE GRANULOCYTES: 1 % (ref 0–5)
KETONES, URINE: NEGATIVE MG/DL
LEUKOCYTE ESTERASE, URINE: NEGATIVE
LYMPHOCYTES ABSOLUTE: 2.1 K/UL (ref 1.5–4)
LYMPHOCYTES RELATIVE PERCENT: 27 % (ref 20–42)
MCH RBC QN AUTO: 31.3 PG (ref 26–35)
MCHC RBC AUTO-ENTMCNC: 33.8 G/DL (ref 32–34.5)
MCV RBC AUTO: 92.6 FL (ref 80–99.9)
MONOCYTES ABSOLUTE: 0.58 K/UL (ref 0.1–0.95)
MONOCYTES RELATIVE PERCENT: 8 % (ref 2–12)
NEUTROPHILS ABSOLUTE: 4.62 K/UL (ref 1.8–7.3)
NEUTROPHILS RELATIVE PERCENT: 60 % (ref 43–80)
NITRITE, URINE: NEGATIVE
PDW BLD-RTO: 13.5 % (ref 11.5–15)
PH UA: 5.5 (ref 5–9)
PLATELET # BLD: 162 K/UL (ref 130–450)
PMV BLD AUTO: 9.4 FL (ref 7–12)
PROTEIN UA: NEGATIVE MG/DL
RBC # BLD: 5.24 M/UL (ref 3.8–5.8)
SPECIFIC GRAVITY UA: 1.02 (ref 1–1.03)
TURBIDITY: CLEAR
URINE HGB: NEGATIVE
UROBILINOGEN, URINE: 0.2 EU/DL (ref 0–1)
VITAMIN B-12: 532 PG/ML (ref 211–946)
WBC # BLD: 7.8 K/UL (ref 4.5–11.5)

## 2023-08-18 LAB
ALBUMIN SERPL-MCNC: 4.4 G/DL (ref 3.5–5.2)
ALP BLD-CCNC: 60 U/L (ref 40–129)
ALT SERPL-CCNC: 33 U/L (ref 0–40)
ANION GAP SERPL CALCULATED.3IONS-SCNC: 18 MMOL/L (ref 7–16)
AST SERPL-CCNC: 30 U/L (ref 0–39)
BILIRUB SERPL-MCNC: 0.6 MG/DL (ref 0–1.2)
BUN BLDV-MCNC: 17 MG/DL (ref 6–23)
CALCIUM SERPL-MCNC: 10 MG/DL (ref 8.6–10.2)
CHLORIDE BLD-SCNC: 102 MMOL/L (ref 98–107)
CHOLESTEROL: 150 MG/DL
CO2: 21 MMOL/L (ref 22–29)
CREAT SERPL-MCNC: 1.1 MG/DL (ref 0.7–1.2)
CREATININE URINE: 112.2 MG/DL (ref 40–278)
GFR SERPL CREATININE-BSD FRML MDRD: >60 ML/MIN/1.73M2
GLUCOSE BLD-MCNC: 110 MG/DL (ref 74–99)
HBA1C MFR BLD: 5.8 % (ref 4–5.6)
HDLC SERPL-MCNC: 35 MG/DL
LDL CHOLESTEROL: 66 MG/DL
MICROALBUMIN/CREAT 24H UR: <12 MG/L (ref 0–19)
MICROALBUMIN/CREAT UR-RTO: NORMAL MCG/MG CREAT (ref 0–30)
POTASSIUM SERPL-SCNC: 4.8 MMOL/L (ref 3.5–5)
PROSTATE SPECIFIC ANTIGEN: 3.88 NG/ML (ref 0–4)
SODIUM BLD-SCNC: 141 MMOL/L (ref 132–146)
TOTAL CK: 76 U/L (ref 20–200)
TOTAL PROTEIN: 7.2 G/DL (ref 6.4–8.3)
TRIGL SERPL-MCNC: 246 MG/DL
URIC ACID: 6.8 MG/DL (ref 3.4–7)
VITAMIN D 25-HYDROXY: 26.2 NG/ML (ref 30–100)
VLDLC SERPL CALC-MCNC: 49 MG/DL

## 2023-08-18 NOTE — RESULT ENCOUNTER NOTE
PSA higher, check of following with urologist and encouraged follow-up.   Keep follow-up with me to review more detail sooner as needed

## 2023-08-29 ENCOUNTER — OFFICE VISIT (OUTPATIENT)
Dept: PRIMARY CARE CLINIC | Age: 73
End: 2023-08-29
Payer: MEDICARE

## 2023-08-29 VITALS
BODY MASS INDEX: 34.66 KG/M2 | HEIGHT: 64 IN | OXYGEN SATURATION: 96 % | SYSTOLIC BLOOD PRESSURE: 128 MMHG | HEART RATE: 57 BPM | TEMPERATURE: 98.1 F | WEIGHT: 203 LBS | DIASTOLIC BLOOD PRESSURE: 64 MMHG

## 2023-08-29 DIAGNOSIS — E55.9 VITAMIN D DEFICIENCY: ICD-10-CM

## 2023-08-29 DIAGNOSIS — E78.2 MIXED HYPERLIPIDEMIA: ICD-10-CM

## 2023-08-29 DIAGNOSIS — E53.8 VITAMIN B12 DEFICIENCY: ICD-10-CM

## 2023-08-29 DIAGNOSIS — K21.9 GASTROESOPHAGEAL REFLUX DISEASE WITHOUT ESOPHAGITIS: ICD-10-CM

## 2023-08-29 DIAGNOSIS — I10 ESSENTIAL HYPERTENSION: ICD-10-CM

## 2023-08-29 DIAGNOSIS — Z12.11 COLON CANCER SCREENING: ICD-10-CM

## 2023-08-29 DIAGNOSIS — R73.03 PREDIABETES: ICD-10-CM

## 2023-08-29 DIAGNOSIS — M17.0 PRIMARY OSTEOARTHRITIS OF BOTH KNEES: ICD-10-CM

## 2023-08-29 DIAGNOSIS — Z72.0 TOBACCO USE: ICD-10-CM

## 2023-08-29 DIAGNOSIS — J44.9 CHRONIC OBSTRUCTIVE PULMONARY DISEASE, UNSPECIFIED COPD TYPE (HCC): ICD-10-CM

## 2023-08-29 DIAGNOSIS — F41.9 ANXIETY AND DEPRESSION: ICD-10-CM

## 2023-08-29 DIAGNOSIS — M1A.9XX0 CHRONIC GOUT WITHOUT TOPHUS, UNSPECIFIED CAUSE, UNSPECIFIED SITE: Primary | ICD-10-CM

## 2023-08-29 DIAGNOSIS — F32.A ANXIETY AND DEPRESSION: ICD-10-CM

## 2023-08-29 PROCEDURE — 3017F COLORECTAL CA SCREEN DOC REV: CPT | Performed by: FAMILY MEDICINE

## 2023-08-29 PROCEDURE — G8417 CALC BMI ABV UP PARAM F/U: HCPCS | Performed by: FAMILY MEDICINE

## 2023-08-29 PROCEDURE — G8427 DOCREV CUR MEDS BY ELIG CLIN: HCPCS | Performed by: FAMILY MEDICINE

## 2023-08-29 PROCEDURE — 1123F ACP DISCUSS/DSCN MKR DOCD: CPT | Performed by: FAMILY MEDICINE

## 2023-08-29 PROCEDURE — 3023F SPIROM DOC REV: CPT | Performed by: FAMILY MEDICINE

## 2023-08-29 PROCEDURE — 3074F SYST BP LT 130 MM HG: CPT | Performed by: FAMILY MEDICINE

## 2023-08-29 PROCEDURE — 1036F TOBACCO NON-USER: CPT | Performed by: FAMILY MEDICINE

## 2023-08-29 PROCEDURE — 99215 OFFICE O/P EST HI 40 MIN: CPT | Performed by: FAMILY MEDICINE

## 2023-08-29 PROCEDURE — 3078F DIAST BP <80 MM HG: CPT | Performed by: FAMILY MEDICINE

## 2023-08-29 RX ORDER — ALLOPURINOL 300 MG/1
300 TABLET ORAL DAILY
Qty: 90 TABLET | Refills: 3 | Status: SHIPPED | OUTPATIENT
Start: 2023-08-29

## 2023-08-29 RX ORDER — OMEPRAZOLE 20 MG/1
20 CAPSULE, DELAYED RELEASE ORAL
Qty: 90 CAPSULE | Refills: 3 | Status: CANCELLED | OUTPATIENT
Start: 2023-08-29

## 2023-08-29 RX ORDER — FLUOXETINE HYDROCHLORIDE 20 MG/1
20 CAPSULE ORAL DAILY
Qty: 90 CAPSULE | Refills: 3 | Status: SHIPPED | OUTPATIENT
Start: 2023-08-29

## 2023-08-29 RX ORDER — METOPROLOL SUCCINATE 25 MG/1
12.5 TABLET, EXTENDED RELEASE ORAL DAILY
Qty: 45 TABLET | Refills: 3 | Status: SHIPPED | OUTPATIENT
Start: 2023-08-29

## 2023-08-29 RX ORDER — ERGOCALCIFEROL 1.25 MG/1
50000 CAPSULE ORAL WEEKLY
Qty: 12 CAPSULE | Refills: 3 | Status: SHIPPED | OUTPATIENT
Start: 2023-08-29

## 2023-08-29 RX ORDER — COLCHICINE 0.6 MG/1
TABLET ORAL
Qty: 3 TABLET | Refills: 3 | Status: SHIPPED | OUTPATIENT
Start: 2023-08-29

## 2023-08-29 RX ORDER — ROSUVASTATIN CALCIUM 10 MG/1
10 TABLET, COATED ORAL DAILY
Qty: 90 TABLET | Refills: 3 | Status: SHIPPED | OUTPATIENT
Start: 2023-08-29

## 2023-08-29 NOTE — PROGRESS NOTES
[facility] : 1950 Sex: male  Age: 72 y.o. Chief Complaint   Patient presents with    Hyperlipidemia    Discuss Labs         HPI:    Presents for follow-up, no acute complaints, chronic knee pain with OA, requesting handicap placard, defers other E/T. Counseled on Voltaren gel, Tylenol, heat/ice, range of motion.   Defers imaging or otherwise    Blood work reviewed CO2 21 anion gap 18 glucose 110 HDL 35 LDL 66 triglyceride 246 vitamin D 26 vitamin B12 532 CBC with differential normal PSA 3.88, encourage follow-up urology ASAP, urinalysis negative microalbumin creatinine ratio-NC    Most Recent Labs  CBC  Lab Results   Component Value Date/Time    WBC 7.8 2023 12:22 PM    WBC 6.7 01/10/2023 10:25 AM    WBC 6.1 2022 12:11 PM    RBC 5.24 2023 12:22 PM    RBC 5.63 01/10/2023 10:25 AM    RBC 5.22 2022 12:11 PM    HGB 16.4 2023 12:22 PM    HGB 16.8 01/10/2023 10:25 AM    HGB 16.1 2022 12:11 PM    HCT 48.5 2023 12:22 PM    HCT 48.9 01/10/2023 10:25 AM    HCT 48.4 2022 12:11 PM    MCV 92.6 2023 12:22 PM    MCV 86.9 01/10/2023 10:25 AM    MCV 92.7 2022 12:11 PM     2023 12:22 PM     01/10/2023 10:25 AM     2022 12:11 PM      CMP  Lab Results   Component Value Date/Time     2023 12:22 PM     2023 10:08 AM     2023 08:54 AM    K 4.8 2023 12:22 PM    K 4.7 2023 10:08 AM    K 4.5 2023 08:54 AM     2023 12:22 PM     2023 10:08 AM     2023 08:54 AM    CO2 21 2023 12:22 PM    CO2 25 2023 10:08 AM    CO2 24 2023 08:54 AM    ANIONGAP 18 2023 12:22 PM    ANIONGAP 15 2023 10:08 AM    ANIONGAP 13 2023 08:54 AM    GLUCOSE 110 2023 12:22 PM    GLUCOSE 128 2023 10:08 AM    GLUCOSE 111 2023 08:54 AM    GLUCOSE 107 04/10/2012 11:24 AM    GLUCOSE 105 2011 10:30 AM    GLUCOSE 102

## 2023-11-09 DIAGNOSIS — I10 ESSENTIAL HYPERTENSION: ICD-10-CM

## 2023-11-09 DIAGNOSIS — M1A.9XX0 CHRONIC GOUT WITHOUT TOPHUS, UNSPECIFIED CAUSE, UNSPECIFIED SITE: ICD-10-CM

## 2023-11-09 DIAGNOSIS — E53.8 VITAMIN B12 DEFICIENCY: ICD-10-CM

## 2023-11-09 DIAGNOSIS — F32.A ANXIETY AND DEPRESSION: ICD-10-CM

## 2023-11-09 DIAGNOSIS — E55.9 VITAMIN D DEFICIENCY: ICD-10-CM

## 2023-11-09 DIAGNOSIS — R73.03 PREDIABETES: ICD-10-CM

## 2023-11-09 DIAGNOSIS — E78.2 MIXED HYPERLIPIDEMIA: ICD-10-CM

## 2023-11-09 DIAGNOSIS — F41.9 ANXIETY AND DEPRESSION: ICD-10-CM

## 2023-11-09 LAB
ABSOLUTE IMMATURE GRANULOCYTE: 0.04 K/UL (ref 0–0.58)
BASOPHILS ABSOLUTE: 0.06 K/UL (ref 0–0.2)
BASOPHILS RELATIVE PERCENT: 1 % (ref 0–2)
EOSINOPHILS ABSOLUTE: 0.33 K/UL (ref 0.05–0.5)
EOSINOPHILS RELATIVE PERCENT: 5 % (ref 0–6)
FOLATE: >20 NG/ML (ref 4.8–24.2)
HBA1C MFR BLD: 6.2 % (ref 4–5.6)
HCT VFR BLD CALC: 47.3 % (ref 37–54)
HEMOGLOBIN: 16.3 G/DL (ref 12.5–16.5)
IMMATURE GRANULOCYTES: 1 % (ref 0–5)
LYMPHOCYTES ABSOLUTE: 2.38 K/UL (ref 1.5–4)
LYMPHOCYTES RELATIVE PERCENT: 35 % (ref 20–42)
MCH RBC QN AUTO: 31 PG (ref 26–35)
MCHC RBC AUTO-ENTMCNC: 34.5 G/DL (ref 32–34.5)
MCV RBC AUTO: 90.1 FL (ref 80–99.9)
MONOCYTES ABSOLUTE: 0.58 K/UL (ref 0.1–0.95)
MONOCYTES RELATIVE PERCENT: 9 % (ref 2–12)
NEUTROPHILS ABSOLUTE: 3.37 K/UL (ref 1.8–7.3)
NEUTROPHILS RELATIVE PERCENT: 50 % (ref 43–80)
PDW BLD-RTO: 13.2 % (ref 11.5–15)
PLATELET # BLD: 132 K/UL (ref 130–450)
PMV BLD AUTO: 9.7 FL (ref 7–12)
RBC # BLD: 5.25 M/UL (ref 3.8–5.8)
TOTAL CK: 116 U/L (ref 20–200)
TSH SERPL DL<=0.05 MIU/L-ACNC: 1.7 UIU/ML (ref 0.27–4.2)
URIC ACID: 7.5 MG/DL (ref 3.4–7)
VITAMIN B-12: 535 PG/ML (ref 211–946)
VITAMIN D 25-HYDROXY: 30.8 NG/ML (ref 30–100)
WBC # BLD: 6.8 K/UL (ref 4.5–11.5)

## 2023-11-13 ENCOUNTER — OFFICE VISIT (OUTPATIENT)
Dept: PRIMARY CARE CLINIC | Age: 73
End: 2023-11-13

## 2023-11-13 VITALS
OXYGEN SATURATION: 96 % | WEIGHT: 205.25 LBS | HEART RATE: 61 BPM | SYSTOLIC BLOOD PRESSURE: 132 MMHG | BODY MASS INDEX: 35.23 KG/M2 | DIASTOLIC BLOOD PRESSURE: 70 MMHG | TEMPERATURE: 97.9 F

## 2023-11-13 DIAGNOSIS — Z23 ENCOUNTER FOR IMMUNIZATION: ICD-10-CM

## 2023-11-13 DIAGNOSIS — N42.9 PROSTATE DISORDER: ICD-10-CM

## 2023-11-13 DIAGNOSIS — E78.2 MIXED HYPERLIPIDEMIA: ICD-10-CM

## 2023-11-13 DIAGNOSIS — M1A.9XX0 CHRONIC GOUT WITHOUT TOPHUS, UNSPECIFIED CAUSE, UNSPECIFIED SITE: Primary | ICD-10-CM

## 2023-11-13 DIAGNOSIS — E66.01 SEVERE OBESITY (BMI 35.0-39.9) WITH COMORBIDITY (HCC): ICD-10-CM

## 2023-11-13 DIAGNOSIS — J44.9 CHRONIC OBSTRUCTIVE PULMONARY DISEASE, UNSPECIFIED COPD TYPE (HCC): ICD-10-CM

## 2023-11-13 DIAGNOSIS — Z72.0 TOBACCO USE: ICD-10-CM

## 2023-11-13 DIAGNOSIS — D69.6 THROMBOCYTOPENIA, UNSPECIFIED (HCC): ICD-10-CM

## 2023-11-13 DIAGNOSIS — E66.01 CLASS 2 SEVERE OBESITY DUE TO EXCESS CALORIES WITH SERIOUS COMORBIDITY AND BODY MASS INDEX (BMI) OF 35.0 TO 35.9 IN ADULT (HCC): ICD-10-CM

## 2023-11-13 DIAGNOSIS — E55.9 VITAMIN D DEFICIENCY: ICD-10-CM

## 2023-11-13 DIAGNOSIS — R73.03 PREDIABETES: ICD-10-CM

## 2023-11-13 DIAGNOSIS — K21.9 GASTROESOPHAGEAL REFLUX DISEASE WITHOUT ESOPHAGITIS: ICD-10-CM

## 2023-11-13 DIAGNOSIS — E53.8 VITAMIN B12 DEFICIENCY: ICD-10-CM

## 2023-11-13 DIAGNOSIS — I10 ESSENTIAL HYPERTENSION: ICD-10-CM

## 2023-11-13 PROBLEM — E11.65 TYPE 2 DIABETES MELLITUS WITH HYPERGLYCEMIA, WITHOUT LONG-TERM CURRENT USE OF INSULIN (HCC): Status: RESOLVED | Noted: 2019-05-22 | Resolved: 2023-11-13

## 2023-11-13 NOTE — PROGRESS NOTES
states he had cut back somewhat, thinking about stopping, declines assistance, last LDCT 2/23    Bilateral knee OA  Failed conservative measures. Continue per [facility]  Topicals reviewed. R/B Tylenol reviewed. Avoidance of NSAID recommendations reviewed    Obesity  Counseled. Risk reviewed. Lifestyle modification emphasized. Counseled on Mediterranean diet/weight watchers. Declines formal invention. B12 deficiency  History of. Risk of low and high reviewed. B12/folic acid now normal      Hypercalcemia  Counseled extensively. Differential reviewed, including serious etiologies. Repeat normal although ionized was mildly elevated, PTH normal.  Potential causes of and risk of reviewed. Emphasize proper hydration avoid excess intake or supplement. Currently normal          AAA screen  Smoking cessation emphasized. Ultrasound 2/23 negative    Anxiety and depression  Stable on fluoxetine. We had previously referred Dr. Kd Mcneal and I encouraged him to call, he has not yet. Recommended he is planning to start counseling as well and to have a counselor. Adamantly denies SI/HI. He is feeling much better with fluoxetine with  standard precautions reviewed including prolonged QT SIADH paradoxical reaction etc. and he would like to continue. At 1 point he was on 30 mg but now 20 mg again and doing well. Insomnia, unspecified type  Counseled, benefiting from melatonin, risk benefits reviewed, has benefited from increased fluoxetine as above. H. pylori infection    Counseled extensively. Differential reviewed, including serious etiologies. symptoms resolved after triple therapy. He was following with Dr. Michele De La Vega and planning EGD/colonoscopy November 2022 but since canceled does not wish to follow-up now.       Gastro-esophageal reflux disease without esophagitis  Care Plan:  Comments : States long-term symptoms, resolved with omeprazole, 6/22 tested positive for H. pylori, since treated and feeling

## 2023-11-14 LAB
ALBUMIN SERPL-MCNC: 4.3 G/DL (ref 3.5–5.2)
ALP BLD-CCNC: 57 U/L (ref 40–129)
ALT SERPL-CCNC: 27 U/L (ref 0–40)
ANION GAP SERPL CALCULATED.3IONS-SCNC: 21 MMOL/L (ref 7–16)
AST SERPL-CCNC: 30 U/L (ref 0–39)
BILIRUB SERPL-MCNC: 0.7 MG/DL (ref 0–1.2)
BUN BLDV-MCNC: 20 MG/DL (ref 6–23)
CALCIUM SERPL-MCNC: 10.1 MG/DL (ref 8.6–10.2)
CHLORIDE BLD-SCNC: 103 MMOL/L (ref 98–107)
CHOLESTEROL: 136 MG/DL
CO2: 19 MMOL/L (ref 22–29)
CREAT SERPL-MCNC: 1.2 MG/DL (ref 0.7–1.2)
GFR SERPL CREATININE-BSD FRML MDRD: >60 ML/MIN/1.73M2
GLUCOSE BLD-MCNC: 116 MG/DL (ref 74–99)
HDLC SERPL-MCNC: 38 MG/DL
LDL CHOLESTEROL: 58 MG/DL
POTASSIUM SERPL-SCNC: 5.2 MMOL/L (ref 3.5–5)
SODIUM BLD-SCNC: 143 MMOL/L (ref 132–146)
TOTAL PROTEIN: 7.5 G/DL (ref 6.4–8.3)
TRIGL SERPL-MCNC: 198 MG/DL
VLDLC SERPL CALC-MCNC: 40 MG/DL

## 2024-02-06 DIAGNOSIS — J44.9 CHRONIC OBSTRUCTIVE PULMONARY DISEASE, UNSPECIFIED COPD TYPE (HCC): ICD-10-CM

## 2024-02-06 RX ORDER — ALBUTEROL SULFATE 90 UG/1
AEROSOL, METERED RESPIRATORY (INHALATION)
Qty: 1 EACH | Refills: 3 | Status: SHIPPED | OUTPATIENT
Start: 2024-02-06

## 2024-02-22 DIAGNOSIS — I10 ESSENTIAL HYPERTENSION: ICD-10-CM

## 2024-02-22 DIAGNOSIS — R73.03 PREDIABETES: ICD-10-CM

## 2024-02-22 DIAGNOSIS — E78.2 MIXED HYPERLIPIDEMIA: ICD-10-CM

## 2024-02-22 DIAGNOSIS — M1A.9XX0 CHRONIC GOUT WITHOUT TOPHUS, UNSPECIFIED CAUSE, UNSPECIFIED SITE: ICD-10-CM

## 2024-02-22 DIAGNOSIS — E55.9 VITAMIN D DEFICIENCY: ICD-10-CM

## 2024-02-22 DIAGNOSIS — E53.8 VITAMIN B12 DEFICIENCY: ICD-10-CM

## 2024-02-22 DIAGNOSIS — N42.9 PROSTATE DISORDER: ICD-10-CM

## 2024-02-22 LAB
ABSOLUTE IMMATURE GRANULOCYTE: <0.03 K/UL (ref 0–0.58)
ALBUMIN SERPL-MCNC: 4.3 G/DL (ref 3.5–5.2)
ALP BLD-CCNC: 59 U/L (ref 40–129)
ALT SERPL-CCNC: 21 U/L (ref 0–40)
ANION GAP SERPL CALCULATED.3IONS-SCNC: 12 MMOL/L (ref 7–16)
AST SERPL-CCNC: 20 U/L (ref 0–39)
BASOPHILS ABSOLUTE: 0.05 K/UL (ref 0–0.2)
BASOPHILS RELATIVE PERCENT: 1 % (ref 0–2)
BILIRUB SERPL-MCNC: 0.5 MG/DL (ref 0–1.2)
BILIRUBIN URINE: NEGATIVE
BUN BLDV-MCNC: 20 MG/DL (ref 6–23)
CALCIUM SERPL-MCNC: 9.7 MG/DL (ref 8.6–10.2)
CHLORIDE BLD-SCNC: 102 MMOL/L (ref 98–107)
CHOLESTEROL: 146 MG/DL
CO2: 24 MMOL/L (ref 22–29)
COLOR: YELLOW
CREAT SERPL-MCNC: 1.2 MG/DL (ref 0.7–1.2)
CREATININE URINE: 138.5 MG/DL (ref 40–278)
EOSINOPHILS ABSOLUTE: 0.25 K/UL (ref 0.05–0.5)
EOSINOPHILS RELATIVE PERCENT: 5 % (ref 0–6)
FOLATE: 10.6 NG/ML (ref 4.8–24.2)
GFR SERPL CREATININE-BSD FRML MDRD: >60 ML/MIN/1.73M2
GLUCOSE BLD-MCNC: 114 MG/DL (ref 74–99)
GLUCOSE URINE: NEGATIVE MG/DL
HBA1C MFR BLD: 5.8 % (ref 4–5.6)
HCT VFR BLD CALC: 51.6 % (ref 37–54)
HDLC SERPL-MCNC: 34 MG/DL
HEMOGLOBIN: 17.2 G/DL (ref 12.5–16.5)
IMMATURE GRANULOCYTES: 0 % (ref 0–5)
KETONES, URINE: NEGATIVE MG/DL
LDL CHOLESTEROL: 69 MG/DL
LEUKOCYTE ESTERASE, URINE: NEGATIVE
LYMPHOCYTES ABSOLUTE: 1.69 K/UL (ref 1.5–4)
LYMPHOCYTES RELATIVE PERCENT: 32 % (ref 20–42)
MCH RBC QN AUTO: 30.2 PG (ref 26–35)
MCHC RBC AUTO-ENTMCNC: 33.3 G/DL (ref 32–34.5)
MCV RBC AUTO: 90.7 FL (ref 80–99.9)
MICROALBUMIN/CREAT 24H UR: <12 MG/L (ref 0–19)
MICROALBUMIN/CREAT UR-RTO: NORMAL MCG/MG CREAT (ref 0–30)
MONOCYTES ABSOLUTE: 0.48 K/UL (ref 0.1–0.95)
MONOCYTES RELATIVE PERCENT: 9 % (ref 2–12)
NEUTROPHILS ABSOLUTE: 2.74 K/UL (ref 1.8–7.3)
NEUTROPHILS RELATIVE PERCENT: 52 % (ref 43–80)
NITRITE, URINE: NEGATIVE
PDW BLD-RTO: 13.6 % (ref 11.5–15)
PH UA: 5.5 (ref 5–9)
PLATELET # BLD: 153 K/UL (ref 130–450)
PMV BLD AUTO: 9.5 FL (ref 7–12)
POTASSIUM SERPL-SCNC: 4.7 MMOL/L (ref 3.5–5)
PROSTATE SPECIFIC ANTIGEN: 3.09 NG/ML (ref 0–4)
PROTEIN UA: NEGATIVE MG/DL
RBC # BLD: 5.69 M/UL (ref 3.8–5.8)
RBC UA: ABNORMAL /HPF
SODIUM BLD-SCNC: 138 MMOL/L (ref 132–146)
SPECIFIC GRAVITY UA: 1.02 (ref 1–1.03)
TOTAL CK: 59 U/L (ref 20–200)
TOTAL PROTEIN: 7.6 G/DL (ref 6.4–8.3)
TRIGL SERPL-MCNC: 213 MG/DL
TSH SERPL DL<=0.05 MIU/L-ACNC: 2.39 UIU/ML (ref 0.27–4.2)
TURBIDITY: CLEAR
URIC ACID: 5.4 MG/DL (ref 3.4–7)
URINE HGB: ABNORMAL
UROBILINOGEN, URINE: 0.2 EU/DL (ref 0–1)
VITAMIN B-12: 529 PG/ML (ref 211–946)
VITAMIN D 25-HYDROXY: 20.5 NG/ML (ref 30–100)
VLDLC SERPL CALC-MCNC: 43 MG/DL
WBC # BLD: 5.2 K/UL (ref 4.5–11.5)
WBC UA: ABNORMAL /HPF

## 2024-02-22 NOTE — RESULT ENCOUNTER NOTE
Blood in urine.  Make sure following with urology.  I would like him to see the urologist for this.  If he does not have an appointment in the near future I would encourage him to make and/or I can place referral if he would like.

## 2024-02-26 ENCOUNTER — OFFICE VISIT (OUTPATIENT)
Dept: PRIMARY CARE CLINIC | Age: 74
End: 2024-02-26
Payer: MEDICARE

## 2024-02-26 VITALS — DIASTOLIC BLOOD PRESSURE: 80 MMHG | SYSTOLIC BLOOD PRESSURE: 136 MMHG | BODY MASS INDEX: 35.02 KG/M2 | WEIGHT: 204 LBS

## 2024-02-26 VITALS
HEIGHT: 64 IN | SYSTOLIC BLOOD PRESSURE: 136 MMHG | OXYGEN SATURATION: 96 % | WEIGHT: 204 LBS | HEART RATE: 69 BPM | TEMPERATURE: 98 F | BODY MASS INDEX: 34.83 KG/M2 | DIASTOLIC BLOOD PRESSURE: 80 MMHG

## 2024-02-26 DIAGNOSIS — F41.9 ANXIETY AND DEPRESSION: ICD-10-CM

## 2024-02-26 DIAGNOSIS — I10 ESSENTIAL HYPERTENSION: ICD-10-CM

## 2024-02-26 DIAGNOSIS — E53.8 VITAMIN B12 DEFICIENCY: ICD-10-CM

## 2024-02-26 DIAGNOSIS — K21.9 GASTROESOPHAGEAL REFLUX DISEASE WITHOUT ESOPHAGITIS: ICD-10-CM

## 2024-02-26 DIAGNOSIS — F32.A ANXIETY AND DEPRESSION: ICD-10-CM

## 2024-02-26 DIAGNOSIS — Z12.11 COLON CANCER SCREENING: ICD-10-CM

## 2024-02-26 DIAGNOSIS — Z00.00 MEDICARE ANNUAL WELLNESS VISIT, SUBSEQUENT: Primary | ICD-10-CM

## 2024-02-26 DIAGNOSIS — E78.2 MIXED HYPERLIPIDEMIA: ICD-10-CM

## 2024-02-26 DIAGNOSIS — E55.9 VITAMIN D DEFICIENCY: ICD-10-CM

## 2024-02-26 DIAGNOSIS — Z87.891 PERSONAL HISTORY OF TOBACCO USE: ICD-10-CM

## 2024-02-26 DIAGNOSIS — J44.9 CHRONIC OBSTRUCTIVE PULMONARY DISEASE, UNSPECIFIED COPD TYPE (HCC): ICD-10-CM

## 2024-02-26 DIAGNOSIS — E66.01 SEVERE OBESITY (BMI 35.0-39.9) WITH COMORBIDITY (HCC): ICD-10-CM

## 2024-02-26 DIAGNOSIS — M1A.9XX0 CHRONIC GOUT WITHOUT TOPHUS, UNSPECIFIED CAUSE, UNSPECIFIED SITE: ICD-10-CM

## 2024-02-26 DIAGNOSIS — R73.03 PREDIABETES: ICD-10-CM

## 2024-02-26 DIAGNOSIS — N42.9 PROSTATE DISORDER: ICD-10-CM

## 2024-02-26 DIAGNOSIS — M17.0 PRIMARY OSTEOARTHRITIS OF BOTH KNEES: Primary | ICD-10-CM

## 2024-02-26 DIAGNOSIS — R31.9 HEMATURIA, UNSPECIFIED TYPE: ICD-10-CM

## 2024-02-26 DIAGNOSIS — Z72.0 TOBACCO USE: ICD-10-CM

## 2024-02-26 PROBLEM — D69.6 THROMBOCYTOPENIA, UNSPECIFIED (HCC): Status: RESOLVED | Noted: 2023-11-13 | Resolved: 2024-02-26

## 2024-02-26 PROBLEM — E11.9 TYPE 2 DIABETES MELLITUS (HCC): Status: RESOLVED | Noted: 2024-02-26 | Resolved: 2024-02-26

## 2024-02-26 PROBLEM — E11.9 TYPE 2 DIABETES MELLITUS (HCC): Status: ACTIVE | Noted: 2024-02-26

## 2024-02-26 PROCEDURE — 3079F DIAST BP 80-89 MM HG: CPT | Performed by: FAMILY MEDICINE

## 2024-02-26 PROCEDURE — 1123F ACP DISCUSS/DSCN MKR DOCD: CPT | Performed by: FAMILY MEDICINE

## 2024-02-26 PROCEDURE — 3075F SYST BP GE 130 - 139MM HG: CPT | Performed by: FAMILY MEDICINE

## 2024-02-26 PROCEDURE — 99214 OFFICE O/P EST MOD 30 MIN: CPT | Performed by: FAMILY MEDICINE

## 2024-02-26 PROCEDURE — G0296 VISIT TO DETERM LDCT ELIG: HCPCS | Performed by: FAMILY MEDICINE

## 2024-02-26 PROCEDURE — G0439 PPPS, SUBSEQ VISIT: HCPCS | Performed by: FAMILY MEDICINE

## 2024-02-26 RX ORDER — ROSUVASTATIN CALCIUM 20 MG/1
20 TABLET, COATED ORAL DAILY
Qty: 90 TABLET | Refills: 3 | Status: SHIPPED | OUTPATIENT
Start: 2024-02-26

## 2024-02-26 SDOH — ECONOMIC STABILITY: FOOD INSECURITY: WITHIN THE PAST 12 MONTHS, YOU WORRIED THAT YOUR FOOD WOULD RUN OUT BEFORE YOU GOT MONEY TO BUY MORE.: NEVER TRUE

## 2024-02-26 SDOH — ECONOMIC STABILITY: FOOD INSECURITY: WITHIN THE PAST 12 MONTHS, THE FOOD YOU BOUGHT JUST DIDN'T LAST AND YOU DIDN'T HAVE MONEY TO GET MORE.: NEVER TRUE

## 2024-02-26 SDOH — ECONOMIC STABILITY: INCOME INSECURITY: HOW HARD IS IT FOR YOU TO PAY FOR THE VERY BASICS LIKE FOOD, HOUSING, MEDICAL CARE, AND HEATING?: NOT HARD AT ALL

## 2024-02-26 ASSESSMENT — LIFESTYLE VARIABLES
HOW OFTEN DO YOU HAVE A DRINK CONTAINING ALCOHOL: MONTHLY OR LESS
HOW MANY STANDARD DRINKS CONTAINING ALCOHOL DO YOU HAVE ON A TYPICAL DAY: 1 OR 2

## 2024-02-26 ASSESSMENT — PATIENT HEALTH QUESTIONNAIRE - PHQ9
SUM OF ALL RESPONSES TO PHQ QUESTIONS 1-9: 2
9. THOUGHTS THAT YOU WOULD BE BETTER OFF DEAD, OR OF HURTING YOURSELF: 0
2. FEELING DOWN, DEPRESSED OR HOPELESS: 1
SUM OF ALL RESPONSES TO PHQ QUESTIONS 1-9: 2
7. TROUBLE CONCENTRATING ON THINGS, SUCH AS READING THE NEWSPAPER OR WATCHING TELEVISION: 0
1. LITTLE INTEREST OR PLEASURE IN DOING THINGS: 1
3. TROUBLE FALLING OR STAYING ASLEEP: 0
SUM OF ALL RESPONSES TO PHQ9 QUESTIONS 1 & 2: 2
4. FEELING TIRED OR HAVING LITTLE ENERGY: 0
8. MOVING OR SPEAKING SO SLOWLY THAT OTHER PEOPLE COULD HAVE NOTICED. OR THE OPPOSITE, BEING SO FIGETY OR RESTLESS THAT YOU HAVE BEEN MOVING AROUND A LOT MORE THAN USUAL: 0
6. FEELING BAD ABOUT YOURSELF - OR THAT YOU ARE A FAILURE OR HAVE LET YOURSELF OR YOUR FAMILY DOWN: 0
10. IF YOU CHECKED OFF ANY PROBLEMS, HOW DIFFICULT HAVE THESE PROBLEMS MADE IT FOR YOU TO DO YOUR WORK, TAKE CARE OF THINGS AT HOME, OR GET ALONG WITH OTHER PEOPLE: 0
5. POOR APPETITE OR OVEREATING: 0
SUM OF ALL RESPONSES TO PHQ QUESTIONS 1-9: 2
SUM OF ALL RESPONSES TO PHQ QUESTIONS 1-9: 2

## 2024-02-26 NOTE — PROGRESS NOTES
Chance Helton : 1950 Sex: male  Age: 73 y.o.    Chief Complaint   Patient presents with    Discuss Labs    Knee Pain     Bilateral          HPI:      Presents for follow-up, worsening chronic knee pain.  Would like to see Dr. Hardwick.  History of injections in the past.  Amenable to surgery if indicated.  Would like to have x-rays.  Going to see Dr. Rendon cardiology soon overall feeling well.    Blood work reviewed blood work reviewed  blood work reviewed glucose 114 CMP otherwise normal HDL 34 LDL 69 triglyceride 213, risk benefits and dose-dependent benefits of Crestor reviewed, agrees to increase, hemoglobin A1c 5.8, not interested in insulin sensitizer or other pharmacotherapy in regard to this, TSH 2.39 vitamin D fluctuates, optimal when he takes his medication, currently off and 20, resume OTC supplementation, vitamin B12 529 hemoglobin 17.2 CBC with differential otherwise normal PSA down to 3.09 but due to see urologist and we did refer back, 10-20 RBC with small hemoglobin and a higher risk with smoking history reviewed.  Asymptomatic    Most Recent Labs  CBC  Lab Results   Component Value Date/Time    WBC 5.2 2024 10:09 AM    WBC 6.8 2023 10:11 AM    WBC 7.8 2023 12:22 PM    RBC 5.69 2024 10:09 AM    RBC 5.25 2023 10:11 AM    RBC 5.24 2023 12:22 PM    HGB 17.2 2024 10:09 AM    HGB 16.3 2023 10:11 AM    HGB 16.4 2023 12:22 PM    HCT 51.6 2024 10:09 AM    HCT 47.3 2023 10:11 AM    HCT 48.5 2023 12:22 PM    MCV 90.7 2024 10:09 AM    MCV 90.1 2023 10:11 AM    MCV 92.6 2023 12:22 PM     2024 10:09 AM     2023 10:11 AM     2023 12:22 PM      CMP  Lab Results   Component Value Date/Time     2024 10:09 AM     2023 10:11 AM     2023 12:22 PM    K 4.7 2024 10:09 AM    K 5.2 2023 10:11 AM    K 4.8 2023 12:22 PM

## 2024-02-26 NOTE — PROGRESS NOTES
abstinence and cessation. The patient has a history of >20 pack years and is either still smoking or quit within the last 15 years. There are no signs or symptoms of lung cancer.                  Objective   Vitals:    02/26/24 0925   BP: 136/80   Pulse: 69   Temp: 98 °F (36.7 °C)   SpO2: 96%   Weight: 92.5 kg (204 lb)   Height: 1.626 m (5' 4\")      Body mass index is 35.02 kg/m².             No Known Allergies  Prior to Visit Medications    Medication Sig Taking? Authorizing Provider   albuterol sulfate HFA (PROVENTIL;VENTOLIN;PROAIR) 108 (90 Base) MCG/ACT inhaler 1-2 puffs q4-6hrs prn Yes Han Elias MD   allopurinol (ZYLOPRIM) 300 MG tablet Take 1 tablet by mouth daily Yes Han Elias MD   FLUoxetine (PROZAC) 20 MG capsule Take 1 capsule by mouth daily Yes Han Elias MD   tiotropium (SPIRIVA) 18 MCG inhalation capsule Inhale 1 capsule into the lungs daily Yes Han Elias MD   vitamin D (ERGOCALCIFEROL) 1.25 MG (13954 UT) CAPS capsule Take 1 capsule by mouth once a week Yes Han Elias MD   metoprolol succinate (TOPROL XL) 25 MG extended release tablet Take 0.5 tablets by mouth daily Yes Han Elias MD   colchicine (COLCRYS) 0.6 MG tablet Two tablets by mouth x 1 at first sign of gout, then one tablet one hour later Yes Han Elias MD   Handicap Kateard MISC by Does not apply route Duration: 5 yrs Yes Han Elias MD   Multiple Vitamin (MULTIVITAMIN PO) Take by mouth daily Yes Bossman Treadwell MD   aspirin 81 MG EC tablet Take 1 tablet by mouth daily Yes Bossman Treadwell MD   rosuvastatin (CRESTOR) 20 MG tablet Take 1 tablet by mouth daily  Han Elias MD       CareTeam (Including outside providers/suppliers regularly involved in providing care):   Patient Care Team:  Han Elias MD as PCP - General  Han Elias MD as PCP - Empaneled Provider  Wili Gordon DO as Surgeon (Cardiothoracic Surgery)  Andrae Edwards MD as Surgeon (General

## 2024-02-26 NOTE — PATIENT INSTRUCTIONS
Preventing Falls: Care Instructions  Injuries and health problems such as trouble walking or poor eyesight can increase your risk of falling. So can some medicines. But there are things you can do to help prevent falls. You can exercise to get stronger. You can also arrange your home to make it safer.    Talk to your doctor about the medicines you take. Ask if any of them increase the risk of falls and whether they can be changed or stopped.   Try to exercise regularly. It can help improve your strength and balance. This can help lower your risk of falling.     Practice fall safety and prevention.    Wear low-heeled shoes that fit well and give your feet good support. Talk to your doctor if you have foot problems that make this hard.  Carry a cellphone or wear a medical alert device that you can use to call for help.  Use stepladders instead of chairs to reach high objects. Don't climb if you're at risk for falls. Ask for help, if needed.  Wear the correct eyeglasses, if you need them.    Make your home safer.    Remove rugs, cords, clutter, and furniture from walkways.  Keep your house well lit. Use night-lights in hallways and bathrooms.  Install and use sturdy handrails on stairways.  Wear nonskid footwear, even inside. Don't walk barefoot or in socks without shoes.    Be safe outside.    Use handrails, curb cuts, and ramps whenever possible.  Keep your hands free by using a shoulder bag or backpack.  Try to walk in well-lit areas. Watch out for uneven ground, changes in pavement, and debris.  Be careful in the winter. Walk on the grass or gravel when sidewalks are slippery. Use de-icer on steps and walkways. Add non-slip devices to shoes.    Put grab bars and nonskid mats in your shower or tub and near the toilet. Try to use a shower chair or bath bench when bathing.   Get into a tub or shower by putting in your weaker leg first. Get out with your strong side first. Have a phone or medical alert device in 
Congestive Heart Failure Assessment    Are you currently restricting fluids?:  No Restriction  Do you understand a low sodium diet?:  Yes  Do you understand how to read food labels?:  Yes  How many restaurant meals do you eat per week?:  0  Do you salt your food before tasting it?:  No  No patient-reported symptoms    Ambulatory Care Coordination Assessment    Care Coordination Protocol  Program Enrollment:  Complex Care  Referral from Primary Care Provider:  No    Week 1 - Initial Assessment   Do you have all of your prescriptions and are they filled?:  Yes  Barriers to medication adherence:  None  Are you able to afford your medications?:  Yes  How often do you have trouble taking your medications the way you have been told to take them?:  I always take them as prescribed. Do you have Home O2 Therapy?:  No      Ability to seek help/take action for Emergent Urgent situations i.e. fire, crime, inclement weather or health crisis.:  Needs Assistance  Ability to ambulate to restroom:  Independent  Ability handle personal hygeine needs (bathing/dressing/grooming):   Independent  Ability to manage Medications:  Needs Assistance  Ability to prepare Food Preparation:  Needs Assistance  Ability to maintain home (clean home, laundry):  Needs Assistance  Ability to drive and/or has transportation:  Dependent  Ability to do shopping:  Needs Assistance  Ability to manage finances:  Needs Assistance  Is patient able to live independently?:  No     Current Housing:  Assisted Living  Per the Fall Risk Screening, did the patient have 2 or more falls or 1 fall with injury in the past year?:  No     Frequent urination at night?:  No  Do you use rails/bars?:  Yes  Do you have a non-slip tub mat?:  Yes     Are you experiencing loss of meaning?:  No  Are you experiencing loss of hope and peace?:  No     Thinking about your patient's physical health needs, are there any symptoms or problems (risk indicators) you are unsure about

## 2024-03-05 ENCOUNTER — HOSPITAL ENCOUNTER (OUTPATIENT)
Dept: CT IMAGING | Age: 74
Discharge: HOME | End: 2024-03-07
Attending: FAMILY MEDICINE
Payer: MEDICARE

## 2024-03-05 DIAGNOSIS — Z87.891 PERSONAL HISTORY OF TOBACCO USE: ICD-10-CM

## 2024-03-05 PROCEDURE — 71271 CT THORAX LUNG CANCER SCR C-: CPT

## 2024-03-05 NOTE — RESULT ENCOUNTER NOTE
LDCT negative, no suspicious pulmonary nodules.  Gallstones noted.  Partial visualization of what they described to be a \"probable cyst upper pole left kidney\".  Continue per OV plan.  See Dr. Shelley as directed as well.  Encourage low-fat small frequent meals.  Follow-up as directed sooner as needed.  If symptoms of gallstones notify or if desires other eval/treatment

## 2024-03-07 ENCOUNTER — TELEPHONE (OUTPATIENT)
Dept: CASE MANAGEMENT | Age: 74
End: 2024-03-07

## 2024-03-07 NOTE — TELEPHONE ENCOUNTER
No call, encounter opened to process CT Lung Screening.    CT Lung Screen: 3/5/2024    IMPRESSION:  No suspicious pulmonary nodules.     Cholelithiasis.     Partial visualization of probable cyst upper pole left kidney.     LUNG RADS:  Lung-RADS 1S - Negative, Significant or Potentially Significant Incidental  Findings (v2022)     Management:  12 month screening LDCT     RECOMMENDATIONS:  If you would like to register your patient with the [facility] Lung Nodule/Lung  Cancer Screening Program, please contact the Nurse Navigator at  [phone].    Pack years: 51.6    Social History     Tobacco Use  Smoking Status: Former Smoker    Start Date: 01/01/1970   Quit Date: 08/26/2021   Types: Cigarettes   Packs/Day: 1   Years: 51.6   Pack Years: 51.6   Smokeless Tobacco: Never         Results letter sent to patient via my chart or mailed.     Arthur Juarez  Imaging Navigator   [facility]   [phone]

## 2024-04-15 ENCOUNTER — OFFICE VISIT (OUTPATIENT)
Dept: ORTHOPEDIC SURGERY | Age: 74
End: 2024-04-15
Payer: MEDICARE

## 2024-04-15 VITALS — BODY MASS INDEX: 34.66 KG/M2 | WEIGHT: 203 LBS | HEIGHT: 64 IN

## 2024-04-15 DIAGNOSIS — M17.11 PRIMARY OSTEOARTHRITIS OF RIGHT KNEE: ICD-10-CM

## 2024-04-15 DIAGNOSIS — M17.0 PRIMARY OSTEOARTHRITIS OF BOTH KNEES: Primary | ICD-10-CM

## 2024-04-15 PROCEDURE — 99204 OFFICE O/P NEW MOD 45 MIN: CPT | Performed by: ORTHOPAEDIC SURGERY

## 2024-04-15 PROCEDURE — 1123F ACP DISCUSS/DSCN MKR DOCD: CPT | Performed by: ORTHOPAEDIC SURGERY

## 2024-04-15 NOTE — PROGRESS NOTES
[facility]   ORTHOPAEDIC SURGERY AND SPORTS MEDICINE  DATE OF VISIT:   04/15/24  New Knee Patient     Referring Provider:   Han Elias MD  [address] [place] [address]    CHIEF COMPLAINT:   Chief Complaint   Patient presents with    Knee Pain     Pt is here today for bilateral knee pain R>L. Pt states his knees have been bothersome for years. He was seen by [facility] a few years back and received cortisone injections. Pt states the injections were not very helpful.         HPI:      Chance Helton is a 73 y.o. year old male who is seen today  for evaluation of bilateral knee pain, right worse than left.  Pain has been present for several years.  He has had injections in the past which provided minimal relief.  His right is worse than his left.  He is having trouble walking even short distances now due to pain.  He does have medical history significant for cardiac history including 5 way bypass years ago.  He sees Dr. Rendon for cardiology.  He is retired.  He formally did samm.      PAST MEDICAL HISTORY  Past Medical History:   Diagnosis Date    CAD (coronary artery disease)     COPD (chronic obstructive pulmonary disease) (HCC)     Hyperlipidemia     Hypertension        PAST SURGICAL HISTORY  Past Surgical History:   Procedure Laterality Date    CORONARY ARTERY BYPASS GRAFT  06/09/11    ACB X 5- DR. ROBLEDO    DIAGNOSTIC CARDIAC CATH LAB PROCEDURE  06/08/11    DR. VÁSQUEZ    TONSILLECTOMY AND ADENOIDECTOMY           FAMILY HISTORY   Family History   Problem Relation Age of Onset    Thyroid Disease Mother     Heart Attack Father 59    Hypertension Father     Hypertension Sister     Heart Surgery Sister     Cirrhosis Brother     Hypertension Sister     Heart Surgery Sister     Heart Surgery Sister     Hypertension Sister        SOCIAL HISTORY  Social History     Socioeconomic History    Marital status:      Spouse name: Not on file    Number of children: Not on file    Years of education:

## 2024-04-17 ENCOUNTER — TELEPHONE (OUTPATIENT)
Dept: ORTHOPEDIC SURGERY | Age: 74
End: 2024-04-17

## 2024-04-17 PROBLEM — M17.11 DEGENERATIVE ARTHRITIS OF RIGHT KNEE: Status: ACTIVE | Noted: 2024-04-17

## 2024-04-17 NOTE — TELEPHONE ENCOUNTER
[facility]  ORTHOPAEDIC SURGERY SCHEDULING NOTE    Patient wishes to proceed after surgery discussion with Dr. Young.     Surgical education was discussed and patient was given the surgical handout. Pre/post-op appointments were made as needed. Patient is also aware to obtain any medical clearances prior to surgery, if requested. Notified that pre-admission testing will also be reaching out for education and instructions on arrival time prior to procedure.     Patient is to obtain clearance(s) from: PCP & CARDIAC     Authorization submitted to [employer]   Status: APPROVED    Requested Services:  1.  04126:  REPLACEMENT OF KNEE JOINT, BOTH SIDES OF KNEE  Status: CERTIFIED  Servicing Provider:DAVID YOUNG; TIN:[id number]; [place] [address]  1  Status:  APPROVED  Authorization #:  [id number]    Surgery: RIGHT KNEE SHILA ROBOTIC ASSISTED TKA   OR DATE: 6/10/24   Vendor:  [facility]   Block: ERVIN  CPT: 23872  DX: M17.11    Special Needs (if applicable): CT        Scheduled: [facility] OR Staff

## 2024-04-22 ENCOUNTER — OFFICE VISIT (OUTPATIENT)
Dept: PRIMARY CARE CLINIC | Age: 74
End: 2024-04-22
Payer: MEDICARE

## 2024-04-22 VITALS
HEART RATE: 66 BPM | OXYGEN SATURATION: 96 % | WEIGHT: 203 LBS | BODY MASS INDEX: 34.84 KG/M2 | TEMPERATURE: 97.2 F | SYSTOLIC BLOOD PRESSURE: 136 MMHG | DIASTOLIC BLOOD PRESSURE: 66 MMHG

## 2024-04-22 DIAGNOSIS — Z72.0 TOBACCO USE: ICD-10-CM

## 2024-04-22 DIAGNOSIS — M1A.9XX0 CHRONIC GOUT WITHOUT TOPHUS, UNSPECIFIED CAUSE, UNSPECIFIED SITE: ICD-10-CM

## 2024-04-22 DIAGNOSIS — M17.0 PRIMARY OSTEOARTHRITIS OF BOTH KNEES: ICD-10-CM

## 2024-04-22 DIAGNOSIS — I10 ESSENTIAL HYPERTENSION: ICD-10-CM

## 2024-04-22 DIAGNOSIS — E78.2 MIXED HYPERLIPIDEMIA: Primary | ICD-10-CM

## 2024-04-22 PROCEDURE — 99214 OFFICE O/P EST MOD 30 MIN: CPT | Performed by: FAMILY MEDICINE

## 2024-04-22 PROCEDURE — 3075F SYST BP GE 130 - 139MM HG: CPT | Performed by: FAMILY MEDICINE

## 2024-04-22 PROCEDURE — 3078F DIAST BP <80 MM HG: CPT | Performed by: FAMILY MEDICINE

## 2024-04-22 PROCEDURE — 1123F ACP DISCUSS/DSCN MKR DOCD: CPT | Performed by: FAMILY MEDICINE

## 2024-04-22 NOTE — PROGRESS NOTES
Chance Helton : 1950 Sex: male  Age: 73 y.o.    Chief Complaint   Patient presents with    Results     CT 24         HPI:      Presents today with his wife primarily responding to a  letter indicating he is overdue on his LDCT which he in fact   which he in fact did .  This was reviewed with him today, there was known cholelithiasis partial visualization of probable cyst upper pole left kidney but no suspicious pulmonary nodules.  He will discuss further Dr. Shelley.  Last visit i.e.  he was referred to Dr. Hardwick and planning right TKA, beginning to get clearance from specialists.  We also increased his Crestor to 20 mg daily and he is subjectively tolerating this.      Most Recent Labs  CBC  Lab Results   Component Value Date/Time    WBC 5.2 2024 10:09 AM    WBC 6.8 2023 10:11 AM    WBC 7.8 2023 12:22 PM    RBC 5.69 2024 10:09 AM    RBC 5.25 2023 10:11 AM    RBC 5.24 2023 12:22 PM    HGB 17.2 2024 10:09 AM    HGB 16.3 2023 10:11 AM    HGB 16.4 2023 12:22 PM    HCT 51.6 2024 10:09 AM    HCT 47.3 2023 10:11 AM    HCT 48.5 2023 12:22 PM    MCV 90.7 2024 10:09 AM    MCV 90.1 2023 10:11 AM    MCV 92.6 2023 12:22 PM     2024 10:09 AM     2023 10:11 AM     2023 12:22 PM      CMP  Lab Results   Component Value Date/Time     2024 10:09 AM     2023 10:11 AM     2023 12:22 PM    K 4.7 2024 10:09 AM    K 5.2 2023 10:11 AM    K 4.8 2023 12:22 PM     2024 10:09 AM     2023 10:11 AM     2023 12:22 PM    CO2 24 2024 10:09 AM    CO2 19 2023 10:11 AM    CO2 21 2023 12:22 PM    ANIONGAP 12 2024 10:09 AM    ANIONGAP 21 2023 10:11 AM    ANIONGAP 18 2023 12:22 PM    GLUCOSE 114 2024 10:09 AM    GLUCOSE 116 2023 10:11 AM    GLUCOSE

## 2024-04-26 ENCOUNTER — TELEPHONE (OUTPATIENT)
Dept: CT IMAGING | Age: 74
End: 2024-04-26

## 2024-04-26 NOTE — TELEPHONE ENCOUNTER
4-18-24 9:52 AM Called patient to schedule him for his CT RIGHT KNEE W/O CONTRAST SHILA PROTOCOL left message for him to call.  4-22-24 1:54 PM left message for patient to call.  4-23-24 9:55 AM left message for patient to call.  4-23-24 9:57 AM called patient contact Elizabeth Helton [phone] left message for her to call or have patient call to schedule.  4-26-24 11:36 AM left message again for patient to call.

## 2024-05-03 NOTE — DISCHARGE INSTRUCTIONS
to wash incision with soap   and water.  Pat incision dry with clean hand towel or let air dry.   DO NOT take baths, go in swimming pools/hot tubs/lakes, or submerge your operative leg under water until you are cleared by Dr. Hardwick.      Do not apply creams, liniments, lotions or ointments directly on incision line.   If incision is draining, keep covered with sterile gauze.  Change dressing daily and each time you shower.    Do not touch incision line with your fingers or scratch incision with your   fingernails (your fingernails harbor germs and bacteria).   Do not allow pets near your incision - do not allow pets to smell or lick incision.    Weight Bearing:   You can be weight bearing as tolerated on your operative leg.  Use a walker/cane as needed.        Signs and symptoms to report to your doctor:     Persistent drainage from the incision.   Increased redness or swelling of the incision or operative extremity.   Increased or excessive pain at the incision site or in the thigh or calf.   Fever over 101 degrees with chills (take your temperature at home and   Record).    Go to Emergency Room if you experience any of the following:     Chest pain or tightness   Shortness of breath or difficulty breathing   Anxiety or feeling of impending doom      Note: The above are signs and symptoms of a blood clot in your    lungs.  Although this is rare, it can occur.  You must be evaluated in the   Emergency Room.    Rehabilitation:   Continue exercises at home as instructed by the Physical and Occupational   Therapists.   Continue Hip / Knee Precautions until cleared by Dr. Hardwick   Begin a formal Outpatient Physical Therapy program as soon as you are able.    First post op visit will include:              Wound check               X-ray of operative joint              Exam by your surgeon               Prescription for Outpatient Physical Therapy     Once you are home:   Call office for appointment at [phone]

## 2024-05-21 DIAGNOSIS — E78.2 MIXED HYPERLIPIDEMIA: ICD-10-CM

## 2024-05-21 LAB
ALBUMIN: 4.4 G/DL (ref 3.5–5.2)
ALP BLD-CCNC: 60 U/L (ref 40–129)
ALT SERPL-CCNC: 22 U/L (ref 0–40)
ANION GAP SERPL CALCULATED.3IONS-SCNC: 15 MMOL/L (ref 7–16)
AST SERPL-CCNC: 21 U/L (ref 0–39)
BILIRUB SERPL-MCNC: 0.7 MG/DL (ref 0–1.2)
BUN BLDV-MCNC: 15 MG/DL (ref 6–23)
CALCIUM SERPL-MCNC: 9.6 MG/DL (ref 8.6–10.2)
CHLORIDE BLD-SCNC: 104 MMOL/L (ref 98–107)
CHOLESTEROL, TOTAL: 129 MG/DL
CO2: 22 MMOL/L (ref 22–29)
CREAT SERPL-MCNC: 1.1 MG/DL (ref 0.7–1.2)
GFR, ESTIMATED: 69 ML/MIN/1.73M2
GLUCOSE BLD-MCNC: 111 MG/DL (ref 74–99)
HDLC SERPL-MCNC: 32 MG/DL
LDL CHOLESTEROL: 62 MG/DL
POTASSIUM SERPL-SCNC: 4.6 MMOL/L (ref 3.5–5)
SODIUM BLD-SCNC: 141 MMOL/L (ref 132–146)
TOTAL CK: 96 U/L (ref 20–200)
TOTAL PROTEIN: 7.2 G/DL (ref 6.4–8.3)
TRIGL SERPL-MCNC: 173 MG/DL
VLDLC SERPL CALC-MCNC: 35 MG/DL

## 2024-05-22 ENCOUNTER — HOSPITAL ENCOUNTER (OUTPATIENT)
Dept: CT IMAGING | Age: 74
Discharge: HOME | End: 2024-05-24
Payer: MEDICARE

## 2024-05-22 DIAGNOSIS — M17.11 PRIMARY OSTEOARTHRITIS OF RIGHT KNEE: ICD-10-CM

## 2024-05-22 PROCEDURE — 73700 CT LOWER EXTREMITY W/O DYE: CPT

## 2024-05-28 ENCOUNTER — OFFICE VISIT (OUTPATIENT)
Dept: PRIMARY CARE CLINIC | Age: 74
End: 2024-05-28

## 2024-05-28 VITALS
HEIGHT: 64 IN | TEMPERATURE: 97.6 F | DIASTOLIC BLOOD PRESSURE: 76 MMHG | HEART RATE: 52 BPM | BODY MASS INDEX: 33.97 KG/M2 | WEIGHT: 199 LBS | SYSTOLIC BLOOD PRESSURE: 138 MMHG | RESPIRATION RATE: 16 BRPM | OXYGEN SATURATION: 96 %

## 2024-05-28 DIAGNOSIS — J44.9 CHRONIC OBSTRUCTIVE PULMONARY DISEASE, UNSPECIFIED COPD TYPE (HCC): ICD-10-CM

## 2024-05-28 DIAGNOSIS — E66.01 CLASS 2 SEVERE OBESITY DUE TO EXCESS CALORIES WITH SERIOUS COMORBIDITY AND BODY MASS INDEX (BMI) OF 35.0 TO 35.9 IN ADULT (HCC): ICD-10-CM

## 2024-05-28 DIAGNOSIS — N42.9 PROSTATE DISORDER: ICD-10-CM

## 2024-05-28 DIAGNOSIS — R73.03 PREDIABETES: ICD-10-CM

## 2024-05-28 DIAGNOSIS — E55.9 VITAMIN D DEFICIENCY: ICD-10-CM

## 2024-05-28 DIAGNOSIS — F41.9 ANXIETY AND DEPRESSION: ICD-10-CM

## 2024-05-28 DIAGNOSIS — F32.A ANXIETY AND DEPRESSION: ICD-10-CM

## 2024-05-28 DIAGNOSIS — E78.2 MIXED HYPERLIPIDEMIA: Primary | ICD-10-CM

## 2024-05-28 DIAGNOSIS — M1A.9XX0 CHRONIC GOUT WITHOUT TOPHUS, UNSPECIFIED CAUSE, UNSPECIFIED SITE: ICD-10-CM

## 2024-05-28 DIAGNOSIS — E53.8 VITAMIN B12 DEFICIENCY: ICD-10-CM

## 2024-05-28 DIAGNOSIS — I10 ESSENTIAL HYPERTENSION: ICD-10-CM

## 2024-05-28 DIAGNOSIS — M17.0 PRIMARY OSTEOARTHRITIS OF BOTH KNEES: ICD-10-CM

## 2024-05-28 DIAGNOSIS — Z72.0 TOBACCO ABUSE: ICD-10-CM

## 2024-05-28 DIAGNOSIS — Z72.0 TOBACCO USE: ICD-10-CM

## 2024-05-28 DIAGNOSIS — I25.10 CORONARY ARTERY DISEASE INVOLVING NATIVE CORONARY ARTERY OF NATIVE HEART WITHOUT ANGINA PECTORIS: ICD-10-CM

## 2024-05-28 NOTE — PROGRESS NOTES
precautions including RI/electrolyte imbalance.  Was following with Dr. Delarosa and planning November EGD/colonoscopy  but does not wish further E/T now, asymptomatic.  Used to take Pepcid.       Cholelithiasis  Incidentally noted on LDCT.  Ultrasound 2022 again showed several small gallstones mild fatty liver, asymptomatic now.  Gallstone again noted on ct chest , asx. Higher risk in diabetics reviewed. Previously referred to dr kitchen, not interested in further e/t now. Of not brother had GB surgery age 80. encouraged small frequent meals, low-fat diet, notify if symptoms    Colon cancer screening  Was planning colonoscopy 2022 but then declined.  Has Cologuard at home, possibly .  Not willing to do now.  Hesitantly agreed to fit test, did not complete    Plan as above.  Counseled extensively and differential diagnoses relevant to above were reviewed, including serious etiologies, risks and complications, especially of left uncontrolled.  If relevant, instructions and  alternatives to meds/treatment reviewed, as well as interactions, and  SE's/ADRs reviewed, notify immediately if any, discontinuing new meds if any.  Plan made after discussion and shared decision making.        Counseled.  Continue per multiple specialists, planning right TKA Dr. Hardwick Chantal 10.  Getting cardiac clearance .  If cleared, otherwise average risk for my POV.  Defers other E/T.  Otherwise simply wants blood work and follow-up 3 months sooner as needed.      As long as symptoms steadily improve/resolve, and medical conditions follow the expected course, FU as below, sooner PRN.    Return in about 3 months (around 2024), or if symptoms worsen or fail to improve.            Educational materials and/or home exercises printed for patient's review and were included in patient instructions on his/her After Visit Summary and given to patient at the end of visit.       After discussion, patient and/or

## 2024-05-30 ENCOUNTER — OFFICE VISIT (OUTPATIENT)
Dept: CARDIOLOGY CLINIC | Age: 74
End: 2024-05-30
Payer: MEDICARE

## 2024-05-30 VITALS
SYSTOLIC BLOOD PRESSURE: 144 MMHG | RESPIRATION RATE: 18 BRPM | BODY MASS INDEX: 34.15 KG/M2 | DIASTOLIC BLOOD PRESSURE: 80 MMHG | WEIGHT: 200 LBS | HEART RATE: 63 BPM | HEIGHT: 64 IN

## 2024-05-30 DIAGNOSIS — Z95.1 S/P CABG (CORONARY ARTERY BYPASS GRAFT): ICD-10-CM

## 2024-05-30 DIAGNOSIS — I10 PRIMARY HYPERTENSION: ICD-10-CM

## 2024-05-30 DIAGNOSIS — E78.00 HYPERCHOLESTEREMIA: ICD-10-CM

## 2024-05-30 DIAGNOSIS — I25.10 CORONARY ARTERY DISEASE INVOLVING NATIVE CORONARY ARTERY OF NATIVE HEART WITHOUT ANGINA PECTORIS: Primary | ICD-10-CM

## 2024-05-30 PROCEDURE — 1123F ACP DISCUSS/DSCN MKR DOCD: CPT | Performed by: INTERNAL MEDICINE

## 2024-05-30 PROCEDURE — 99214 OFFICE O/P EST MOD 30 MIN: CPT | Performed by: INTERNAL MEDICINE

## 2024-05-30 PROCEDURE — 93000 ELECTROCARDIOGRAM COMPLETE: CPT | Performed by: INTERNAL MEDICINE

## 2024-05-30 PROCEDURE — 3079F DIAST BP 80-89 MM HG: CPT | Performed by: INTERNAL MEDICINE

## 2024-05-30 PROCEDURE — 3077F SYST BP >= 140 MM HG: CPT | Performed by: INTERNAL MEDICINE

## 2024-05-30 RX ORDER — REGADENOSON 0.08 MG/ML
0.4 INJECTION, SOLUTION INTRAVENOUS
OUTPATIENT
Start: 2024-05-30

## 2024-05-31 ENCOUNTER — ANESTHESIA EVENT (OUTPATIENT)
Dept: OPERATING ROOM | Age: 74
End: 2024-05-31
Payer: MEDICARE

## 2024-05-31 ENCOUNTER — HOSPITAL ENCOUNTER (OUTPATIENT)
Dept: PREADMISSION TESTING | Age: 74
Discharge: HOME | End: 2024-05-31
Payer: MEDICARE

## 2024-05-31 VITALS
WEIGHT: 199 LBS | SYSTOLIC BLOOD PRESSURE: 147 MMHG | HEART RATE: 53 BPM | RESPIRATION RATE: 14 BRPM | OXYGEN SATURATION: 96 % | TEMPERATURE: 97.7 F | BODY MASS INDEX: 33.97 KG/M2 | DIASTOLIC BLOOD PRESSURE: 70 MMHG | HEIGHT: 64 IN

## 2024-05-31 DIAGNOSIS — Z01.818 PRE-OP TESTING: ICD-10-CM

## 2024-05-31 LAB
ANION GAP SERPL CALCULATED.3IONS-SCNC: 7 MMOL/L (ref 7–16)
BASOPHILS # BLD: 0.04 K/UL (ref 0–0.2)
BASOPHILS NFR BLD: 1 % (ref 0–2)
BUN SERPL-MCNC: 13 MG/DL (ref 6–23)
CALCIUM SERPL-MCNC: 9.8 MG/DL (ref 8.6–10.2)
CHLORIDE SERPL-SCNC: 108 MMOL/L (ref 98–107)
CO2 SERPL-SCNC: 28 MMOL/L (ref 22–29)
CREAT SERPL-MCNC: 1.1 MG/DL (ref 0.7–1.2)
EOSINOPHIL # BLD: 0.26 K/UL (ref 0.05–0.5)
EOSINOPHILS RELATIVE PERCENT: 5 % (ref 0–6)
ERYTHROCYTE [DISTWIDTH] IN BLOOD BY AUTOMATED COUNT: 13.6 % (ref 11.5–15)
GFR, ESTIMATED: 73 ML/MIN/1.73M2
GLUCOSE SERPL-MCNC: 118 MG/DL (ref 74–99)
HBA1C MFR BLD: 6.3 % (ref 4–5.6)
HCT VFR BLD AUTO: 49.3 % (ref 37–54)
HGB BLD-MCNC: 16.2 G/DL (ref 12.5–16.5)
IMM GRANULOCYTES # BLD AUTO: 0.03 K/UL (ref 0–0.58)
IMM GRANULOCYTES NFR BLD: 1 % (ref 0–5)
LYMPHOCYTES NFR BLD: 1.67 K/UL (ref 1.5–4)
LYMPHOCYTES RELATIVE PERCENT: 31 % (ref 20–42)
MCH RBC QN AUTO: 30.3 PG (ref 26–35)
MCHC RBC AUTO-ENTMCNC: 32.9 G/DL (ref 32–34.5)
MCV RBC AUTO: 92.1 FL (ref 80–99.9)
MONOCYTES NFR BLD: 0.47 K/UL (ref 0.1–0.95)
MONOCYTES NFR BLD: 9 % (ref 2–12)
NEUTROPHILS NFR BLD: 55 % (ref 43–80)
NEUTS SEG NFR BLD: 3.01 K/UL (ref 1.8–7.3)
PLATELET # BLD AUTO: 128 K/UL (ref 130–450)
PMV BLD AUTO: 9.3 FL (ref 7–12)
POTASSIUM SERPL-SCNC: 4.8 MMOL/L (ref 3.5–5)
PREALB SERPL-MCNC: 28 MG/DL (ref 20–40)
RBC # BLD AUTO: 5.35 M/UL (ref 3.8–5.8)
SODIUM SERPL-SCNC: 143 MMOL/L (ref 132–146)
WBC OTHER # BLD: 5.5 K/UL (ref 4.5–11.5)

## 2024-05-31 PROCEDURE — 83036 HEMOGLOBIN GLYCOSYLATED A1C: CPT

## 2024-05-31 PROCEDURE — 36415 COLL VENOUS BLD VENIPUNCTURE: CPT

## 2024-05-31 PROCEDURE — 80048 BASIC METABOLIC PNL TOTAL CA: CPT

## 2024-05-31 PROCEDURE — 87081 CULTURE SCREEN ONLY: CPT

## 2024-05-31 PROCEDURE — 84134 ASSAY OF PREALBUMIN: CPT

## 2024-05-31 PROCEDURE — 85025 COMPLETE CBC W/AUTO DIFF WBC: CPT

## 2024-05-31 ASSESSMENT — PAIN - FUNCTIONAL ASSESSMENT: PAIN_FUNCTIONAL_ASSESSMENT: PREVENTS OR INTERFERES WITH ALL ACTIVE AND SOME PASSIVE ACTIVITIES

## 2024-05-31 ASSESSMENT — PAIN DESCRIPTION - LOCATION: LOCATION: KNEE

## 2024-05-31 ASSESSMENT — PAIN DESCRIPTION - ORIENTATION: ORIENTATION: RIGHT

## 2024-05-31 ASSESSMENT — PAIN DESCRIPTION - FREQUENCY: FREQUENCY: INTERMITTENT

## 2024-05-31 ASSESSMENT — PAIN SCALES - GENERAL: PAINLEVEL_OUTOF10: 9

## 2024-05-31 ASSESSMENT — PAIN DESCRIPTION - ONSET: ONSET: ON-GOING

## 2024-05-31 ASSESSMENT — PAIN DESCRIPTION - DESCRIPTORS: DESCRIPTORS: ACHING;BURNING;STABBING

## 2024-05-31 ASSESSMENT — PAIN DESCRIPTION - PAIN TYPE: TYPE: CHRONIC PAIN

## 2024-05-31 NOTE — PROGRESS NOTES
[facility] PRE-ADMISSION TESTING INSTRUCTIONS    The Preadmission Testing patient is instructed accordingly using the following criteria (check applicable):    ARRIVAL INSTRUCTIONS:  [x] Parking the day of Surgery is located in the Main Entrance lot.  Upon entering the door, make an immediate right to the surgery reception desk    [x] Bring photo ID and insurance card    [] Bring in a copy of Living will or Durable Power of  papers.    [x] Please be sure to arrange for responsible adult to provide transportation to and from the hospital    [x] Please arrange for responsible adult to be with you for the 24 hour period post procedure due to having anesthesia    [x] If you awake am of surgery not feeling well or have temperature >100 please call [phone]    GENERAL INSTRUCTIONS:    [x] Follow instructions for hydration that have been provided to you at your Pre-Admission Visit. Solid food until midnight then clear liquids. No gum, candy or mints.    [x] You may brush your teeth, but do not swallow any water    [] Take medications as instructed with 1-2 oz of water    [x] Stop herbal supplements and vitamins 5 days prior to procedure    [x] Follow preop dosing of blood thinners per physician instructions    [] Take 1/2 dose of evening insulin, but no insulin after midnight    [] No oral diabetic medications after midnight    [] If diabetic and have low blood sugar or feel symptomatic, take 1-2oz apple juice only    [x] Bring inhalers day of surgery    [] Bring C-PAP/ Bi-Pap day of surgery    [] Bring urine specimen day of surgery    [x] Shower or bath with soap, lather and rinse well, AM of Surgery, no lotion, powders or creams to surgical site    [] Follow bowel prep as instructed per surgeon    [x] No tobacco products within 24 hours of surgery     [x] No alcohol or illegal drug use within 24 hours of surgery.    [x] Jewelry, body piercing's, eyeglasses, contact lenses and

## 2024-05-31 NOTE — ANESTHESIA PRE PROCEDURE
05/31/2024 11:45 AM       CMP:   Lab Results   Component Value Date/Time     05/21/2024 09:51 AM    K 4.6 05/21/2024 09:51 AM     05/21/2024 09:51 AM    CO2 22 05/21/2024 09:51 AM    BUN 15 05/21/2024 09:51 AM    CREATININE 1.1 05/21/2024 09:51 AM    GFRAA >60 09/23/2022 12:11 PM    LABGLOM 69 05/21/2024 09:51 AM    LABGLOM >60 02/22/2024 10:09 AM    GLUCOSE 111 05/21/2024 09:51 AM    GLUCOSE 107 04/10/2012 11:24 AM    CALCIUM 9.6 05/21/2024 09:51 AM    BILITOT 0.7 05/21/2024 09:51 AM    ALKPHOS 60 05/21/2024 09:51 AM    AST 21 05/21/2024 09:51 AM    ALT 22 05/21/2024 09:51 AM       POC Tests: No results for input(s): \"POCGLU\", \"POCNA\", \"POCK\", \"POCCL\", \"POCBUN\", \"POCHEMO\", \"POCHCT\" in the last 72 hours.    Coags:   Lab Results   Component Value Date/Time    PROTIME 13.7 06/20/2011 03:18 AM    INR 1.5 06/20/2011 03:18 AM    APTT 27.4 06/20/2011 03:18 AM       HCG (If Applicable): No results found for: \"PREGTESTUR\", \"PREGSERUM\", \"HCG\", \"HCGQUANT\"     ABGs:   Lab Results   Component Value Date/Time    Y6GBRAMV 95.7 06/09/2011 08:00 PM        Type & Screen (If Applicable):  No results found for: \"LABABO\"    Drug/Infectious Status (If Applicable):  No results found for: \"HIV\", \"HEPCAB\"    COVID-19 Screening (If Applicable): No results found for: \"COVID19\"        Anesthesia Evaluation  Patient summary reviewed   no history of anesthetic complications:   Airway: Mallampati: III  TM distance: >3 FB   Neck ROM: full  Mouth opening: > = 3 FB   Dental:      Comment: Several missing teeth    Pulmonary: breath sounds clear to auscultation  (+)  COPD:                                     Cardiovascular:    (+) hypertension:, CAD:, CABG/stent:, hyperlipidemia        Rhythm: regular             Beta Blocker:  Dose within 24 Hrs         Neuro/Psych:   (+) depression/anxiety             GI/Hepatic/Renal:        (-) GERD and liver disease       Endo/Other:    (+) : arthritis:..                 Abdominal:

## 2024-06-02 LAB
MICROORGANISM SPEC CULT: NORMAL
SPECIMEN DESCRIPTION: NORMAL

## 2024-06-03 ENCOUNTER — TELEPHONE (OUTPATIENT)
Dept: CARDIOLOGY | Age: 74
End: 2024-06-03

## 2024-06-03 ENCOUNTER — OFFICE VISIT (OUTPATIENT)
Dept: ORTHOPEDIC SURGERY | Age: 74
End: 2024-06-03
Payer: MEDICARE

## 2024-06-03 VITALS — BODY MASS INDEX: 33.97 KG/M2 | HEIGHT: 64 IN | WEIGHT: 199 LBS

## 2024-06-03 DIAGNOSIS — Z01.818 PRE-OP EXAM: Primary | ICD-10-CM

## 2024-06-03 DIAGNOSIS — M17.11 PRIMARY OSTEOARTHRITIS OF RIGHT KNEE: ICD-10-CM

## 2024-06-03 PROCEDURE — 99214 OFFICE O/P EST MOD 30 MIN: CPT | Performed by: ORTHOPAEDIC SURGERY

## 2024-06-03 PROCEDURE — 1123F ACP DISCUSS/DSCN MKR DOCD: CPT | Performed by: ORTHOPAEDIC SURGERY

## 2024-06-03 NOTE — PROGRESS NOTES
Department of Orthopedic Surgery  Attending Pre-operative History and Physical        DIAGNOSIS: Right knee osteoarthritis    INDICATION:  Failed Conservative Management      PROCEDURE: Right knee Dejon robot assisted total knee arthroplasty    CHIEF COMPLAINT: Right knee pain    History Obtained From:  patient    HISTORY OF PRESENT ILLNESS:      The patient is a 73 y.o. male with significant past medical history of right knee end-stage osteoarthritis that has failed conservative treatment.  He is therefore interested in total knee arthroplasty.  We discussed the use of the Dejon robot.  We also discussed the risks, benefits, alternatives to surgery.  Risks include but are not limited to infection, neurovascular injury, DVT/pulm embolus, arthrofibrosis, need for revision surgery, unforeseen risks etc.  He understands and would like to proceed     Past Medical History:        Diagnosis Date    Awareness under anesthesia     CAD (coronary artery disease)     COPD (chronic obstructive pulmonary disease) (HCC)     Hyperlipidemia     Hypertension        Past Surgical History:        Procedure Laterality Date    BACK SURGERY  2015    spur removal lower back    CORONARY ARTERY BYPASS GRAFT  06/09/2011    ACB X 5- DR. ROBLEDO    DIAGNOSTIC CARDIAC CATH LAB PROCEDURE  06/08/2011    DR. VÁSQUEZ    TONSILLECTOMY AND ADENOIDECTOMY         Medications Prior to Admission:   Not in a hospital admission.    Allergies:  Patient has no known allergies.    History of allergic reaction to anesthesia:  No    Social History:   TOBACCO:   reports that he has been smoking cigarettes. He started smoking about 8 years ago. He has a 2.1 pack-year smoking history. He has never used smokeless tobacco.  ETOH:   reports current alcohol use.  DRUGS:   reports no history of drug use.    Family History:       Problem Relation Age of Onset    Thyroid Disease Mother     Heart Attack Father 59    Hypertension Father     Hypertension Sister     Heart

## 2024-06-03 NOTE — TELEPHONE ENCOUNTER
Spoke with patient and confirmed pharmacological  stress test appointment on 6/5/2024 at 0800. Instructions for test,given including NPO after MN, no caffeine products 12 hours prior to the test ,hold Toprol XL 12 hours prior to the test ,bring inhaler to the test , and preprocedure information reviewed with patient, questions answered. Patient verbalized understanding.

## 2024-06-05 ENCOUNTER — TELEPHONE (OUTPATIENT)
Dept: CARDIOLOGY CLINIC | Age: 74
End: 2024-06-05

## 2024-06-05 ENCOUNTER — HOSPITAL ENCOUNTER (OUTPATIENT)
Dept: CARDIOLOGY | Age: 74
Discharge: HOME | End: 2024-06-07
Attending: INTERNAL MEDICINE
Payer: MEDICARE

## 2024-06-05 VITALS
RESPIRATION RATE: 14 BRPM | WEIGHT: 200 LBS | HEIGHT: 64 IN | DIASTOLIC BLOOD PRESSURE: 72 MMHG | BODY MASS INDEX: 34.15 KG/M2 | SYSTOLIC BLOOD PRESSURE: 128 MMHG | HEART RATE: 54 BPM

## 2024-06-05 DIAGNOSIS — I25.10 CORONARY ARTERY DISEASE INVOLVING NATIVE CORONARY ARTERY OF NATIVE HEART WITHOUT ANGINA PECTORIS: Primary | ICD-10-CM

## 2024-06-05 LAB
ECHO BSA: 2.02 M2
NUC STRESS EJECTION FRACTION: 56 %
STRESS BASELINE DIAS BP: 72 MMHG
STRESS BASELINE HR: 49 BPM
STRESS BASELINE SYS BP: 128 MMHG
STRESS ESTIMATED WORKLOAD: 1.1 METS
STRESS PEAK DIAS BP: 72 MMHG
STRESS PEAK SYS BP: 120 MMHG
STRESS PERCENT HR ACHIEVED: 55 %
STRESS POST PEAK HR: 82 BPM
STRESS RATE PRESSURE PRODUCT: 9840 BPM*MMHG
STRESS TARGET HR: 149 BPM

## 2024-06-05 PROCEDURE — 2580000003 HC RX 258: Performed by: INTERNAL MEDICINE

## 2024-06-05 PROCEDURE — 3430000000 HC RX DIAGNOSTIC RADIOPHARMACEUTICAL: Performed by: INTERNAL MEDICINE

## 2024-06-05 PROCEDURE — 6360000002 HC RX W HCPCS: Performed by: INTERNAL MEDICINE

## 2024-06-05 PROCEDURE — A9500 TC99M SESTAMIBI: HCPCS | Performed by: INTERNAL MEDICINE

## 2024-06-05 PROCEDURE — 93017 CV STRESS TEST TRACING ONLY: CPT

## 2024-06-05 RX ORDER — TETRAKIS(2-METHOXYISOBUTYLISOCYANIDE)COPPER(I) TETRAFLUOROBORATE 1 MG/ML
32.9 INJECTION, POWDER, LYOPHILIZED, FOR SOLUTION INTRAVENOUS
Status: COMPLETED | OUTPATIENT
Start: 2024-06-05 | End: 2024-06-05

## 2024-06-05 RX ORDER — SODIUM CHLORIDE 0.9 % (FLUSH) 0.9 %
10 SYRINGE (ML) INJECTION PRN
Status: DISCONTINUED | OUTPATIENT
Start: 2024-06-05 | End: 2024-06-08 | Stop reason: HOSPADM

## 2024-06-05 RX ORDER — REGADENOSON 0.08 MG/ML
0.4 INJECTION, SOLUTION INTRAVENOUS
Status: COMPLETED | OUTPATIENT
Start: 2024-06-05 | End: 2024-06-05

## 2024-06-05 RX ORDER — TETRAKIS(2-METHOXYISOBUTYLISOCYANIDE)COPPER(I) TETRAFLUOROBORATE 1 MG/ML
10.9 INJECTION, POWDER, LYOPHILIZED, FOR SOLUTION INTRAVENOUS
Status: COMPLETED | OUTPATIENT
Start: 2024-06-05 | End: 2024-06-05

## 2024-06-05 RX ADMIN — Medication 10.9 MILLICURIE: at 07:54

## 2024-06-05 RX ADMIN — REGADENOSON 0.4 MG: 0.08 INJECTION, SOLUTION INTRAVENOUS at 09:45

## 2024-06-05 RX ADMIN — Medication 32.9 MILLICURIE: at 09:46

## 2024-06-05 RX ADMIN — SODIUM CHLORIDE, PRESERVATIVE FREE 10 ML: 5 INJECTION INTRAVENOUS at 09:46

## 2024-06-05 RX ADMIN — SODIUM CHLORIDE, PRESERVATIVE FREE 10 ML: 5 INJECTION INTRAVENOUS at 07:54

## 2024-06-05 RX ADMIN — SODIUM CHLORIDE, PRESERVATIVE FREE 10 ML: 5 INJECTION INTRAVENOUS at 09:45

## 2024-06-05 NOTE — TELEPHONE ENCOUNTER
----- Message from Slava Rendon MD sent at 6/5/2024  3:37 PM EDT -----  Please let patient know that stress test was normal and letter was sent to Dr. Hardwick

## 2024-06-10 ENCOUNTER — APPOINTMENT (OUTPATIENT)
Dept: GENERAL RADIOLOGY | Age: 74
End: 2024-06-10
Attending: ORTHOPAEDIC SURGERY
Payer: MEDICARE

## 2024-06-10 ENCOUNTER — ANESTHESIA (OUTPATIENT)
Dept: OPERATING ROOM | Age: 74
End: 2024-06-10
Payer: MEDICARE

## 2024-06-10 ENCOUNTER — HOSPITAL ENCOUNTER (OUTPATIENT)
Age: 74
Setting detail: OBSERVATION
Discharge: HOME | End: 2024-06-11
Attending: ORTHOPAEDIC SURGERY | Admitting: ORTHOPAEDIC SURGERY
Payer: MEDICARE

## 2024-06-10 DIAGNOSIS — Z01.818 PRE-OP TESTING: ICD-10-CM

## 2024-06-10 DIAGNOSIS — Z96.651 STATUS POST RIGHT KNEE REPLACEMENT: Primary | ICD-10-CM

## 2024-06-10 DIAGNOSIS — M17.11 DEGENERATIVE ARTHRITIS OF RIGHT KNEE: ICD-10-CM

## 2024-06-10 LAB — GLUCOSE BLD-MCNC: 112 MG/DL (ref 74–99)

## 2024-06-10 PROCEDURE — 2500000003 HC RX 250 WO HCPCS: Performed by: NURSE ANESTHETIST, CERTIFIED REGISTERED

## 2024-06-10 PROCEDURE — 7100000001 HC PACU RECOVERY - ADDTL 15 MIN: Performed by: ORTHOPAEDIC SURGERY

## 2024-06-10 PROCEDURE — 2500000003 HC RX 250 WO HCPCS: Performed by: NURSE PRACTITIONER

## 2024-06-10 PROCEDURE — 82962 GLUCOSE BLOOD TEST: CPT

## 2024-06-10 PROCEDURE — 64447 NJX AA&/STRD FEMORAL NRV IMG: CPT | Performed by: ANESTHESIOLOGY

## 2024-06-10 PROCEDURE — 2580000003 HC RX 258: Performed by: ORTHOPAEDIC SURGERY

## 2024-06-10 PROCEDURE — 97161 PT EVAL LOW COMPLEX 20 MIN: CPT

## 2024-06-10 PROCEDURE — G0378 HOSPITAL OBSERVATION PER HR: HCPCS

## 2024-06-10 PROCEDURE — 7100000000 HC PACU RECOVERY - FIRST 15 MIN: Performed by: ORTHOPAEDIC SURGERY

## 2024-06-10 PROCEDURE — C1776 JOINT DEVICE (IMPLANTABLE): HCPCS | Performed by: ORTHOPAEDIC SURGERY

## 2024-06-10 PROCEDURE — 6370000000 HC RX 637 (ALT 250 FOR IP): Performed by: NURSE PRACTITIONER

## 2024-06-10 PROCEDURE — 6360000002 HC RX W HCPCS: Performed by: ANESTHESIOLOGY

## 2024-06-10 PROCEDURE — 3700000001 HC ADD 15 MINUTES (ANESTHESIA): Performed by: ORTHOPAEDIC SURGERY

## 2024-06-10 PROCEDURE — 6360000002 HC RX W HCPCS: Performed by: ORTHOPAEDIC SURGERY

## 2024-06-10 PROCEDURE — 0055T BONE SRGRY CMPTR CT/MRI IMAG: CPT | Performed by: ORTHOPAEDIC SURGERY

## 2024-06-10 PROCEDURE — 2709999900 HC NON-CHARGEABLE SUPPLY: Performed by: ORTHOPAEDIC SURGERY

## 2024-06-10 PROCEDURE — 97530 THERAPEUTIC ACTIVITIES: CPT

## 2024-06-10 PROCEDURE — C1713 ANCHOR/SCREW BN/BN,TIS/BN: HCPCS | Performed by: ORTHOPAEDIC SURGERY

## 2024-06-10 PROCEDURE — 2580000003 HC RX 258: Performed by: NURSE PRACTITIONER

## 2024-06-10 PROCEDURE — 27447 TOTAL KNEE ARTHROPLASTY: CPT | Performed by: ORTHOPAEDIC SURGERY

## 2024-06-10 PROCEDURE — 3600000005 HC SURGERY LEVEL 5 BASE: Performed by: ORTHOPAEDIC SURGERY

## 2024-06-10 PROCEDURE — 73560 X-RAY EXAM OF KNEE 1 OR 2: CPT

## 2024-06-10 PROCEDURE — 3700000000 HC ANESTHESIA ATTENDED CARE: Performed by: ORTHOPAEDIC SURGERY

## 2024-06-10 PROCEDURE — 2500000003 HC RX 250 WO HCPCS: Performed by: ORTHOPAEDIC SURGERY

## 2024-06-10 PROCEDURE — 3600000015 HC SURGERY LEVEL 5 ADDTL 15MIN: Performed by: ORTHOPAEDIC SURGERY

## 2024-06-10 PROCEDURE — 2720000010 HC SURG SUPPLY STERILE: Performed by: ORTHOPAEDIC SURGERY

## 2024-06-10 PROCEDURE — 6370000000 HC RX 637 (ALT 250 FOR IP): Performed by: ORTHOPAEDIC SURGERY

## 2024-06-10 PROCEDURE — 6360000002 HC RX W HCPCS: Performed by: NURSE PRACTITIONER

## 2024-06-10 PROCEDURE — 6360000002 HC RX W HCPCS: Performed by: NURSE ANESTHETIST, CERTIFIED REGISTERED

## 2024-06-10 DEVICE — CRUCIATE RETAINING FEMORAL
Type: IMPLANTABLE DEVICE | Site: KNEE | Status: FUNCTIONAL
Brand: TRIATHLON

## 2024-06-10 DEVICE — PATELLA
Type: IMPLANTABLE DEVICE | Site: KNEE | Status: FUNCTIONAL
Brand: TRIATHLON

## 2024-06-10 DEVICE — TIBIAL COMPONENT
Type: IMPLANTABLE DEVICE | Site: KNEE | Status: FUNCTIONAL
Brand: TRIATHLON

## 2024-06-10 DEVICE — TIBIAL BEARING INSERT - CR
Type: IMPLANTABLE DEVICE | Site: KNEE | Status: FUNCTIONAL
Brand: TRIATHLON

## 2024-06-10 RX ORDER — OXYCODONE HYDROCHLORIDE 5 MG/1
10 TABLET ORAL EVERY 4 HOURS PRN
Status: DISCONTINUED | OUTPATIENT
Start: 2024-06-10 | End: 2024-06-11 | Stop reason: HOSPADM

## 2024-06-10 RX ORDER — VANCOMYCIN HYDROCHLORIDE 1 G/20ML
INJECTION, POWDER, LYOPHILIZED, FOR SOLUTION INTRAVENOUS PRN
Status: DISCONTINUED | OUTPATIENT
Start: 2024-06-10 | End: 2024-06-10 | Stop reason: ALTCHOICE

## 2024-06-10 RX ORDER — KETOROLAC TROMETHAMINE 15 MG/ML
15 INJECTION, SOLUTION INTRAMUSCULAR; INTRAVENOUS EVERY 6 HOURS
Status: DISCONTINUED | OUTPATIENT
Start: 2024-06-10 | End: 2024-06-11 | Stop reason: HOSPADM

## 2024-06-10 RX ORDER — ONDANSETRON 2 MG/ML
4 INJECTION INTRAMUSCULAR; INTRAVENOUS
Status: DISCONTINUED | OUTPATIENT
Start: 2024-06-10 | End: 2024-06-10 | Stop reason: HOSPADM

## 2024-06-10 RX ORDER — MIDAZOLAM HYDROCHLORIDE 1 MG/ML
INJECTION INTRAMUSCULAR; INTRAVENOUS PRN
Status: DISCONTINUED | OUTPATIENT
Start: 2024-06-10 | End: 2024-06-10 | Stop reason: SDUPTHER

## 2024-06-10 RX ORDER — OXYCODONE HYDROCHLORIDE 5 MG/1
5 TABLET ORAL EVERY 4 HOURS PRN
Status: DISCONTINUED | OUTPATIENT
Start: 2024-06-10 | End: 2024-06-11 | Stop reason: HOSPADM

## 2024-06-10 RX ORDER — ALLOPURINOL 300 MG/1
300 TABLET ORAL DAILY
Status: DISCONTINUED | OUTPATIENT
Start: 2024-06-10 | End: 2024-06-11 | Stop reason: HOSPADM

## 2024-06-10 RX ORDER — DEXAMETHASONE SODIUM PHOSPHATE 10 MG/ML
8 INJECTION, SOLUTION INTRAMUSCULAR; INTRAVENOUS ONCE
Status: DISCONTINUED | OUTPATIENT
Start: 2024-06-10 | End: 2024-06-10 | Stop reason: HOSPADM

## 2024-06-10 RX ORDER — MIDAZOLAM HYDROCHLORIDE 2 MG/2ML
2 INJECTION, SOLUTION INTRAMUSCULAR; INTRAVENOUS
Status: DISCONTINUED | OUTPATIENT
Start: 2024-06-10 | End: 2024-06-10 | Stop reason: HOSPADM

## 2024-06-10 RX ORDER — LIDOCAINE HYDROCHLORIDE 20 MG/ML
INJECTION, SOLUTION EPIDURAL; INFILTRATION; INTRACAUDAL; PERINEURAL PRN
Status: DISCONTINUED | OUTPATIENT
Start: 2024-06-10 | End: 2024-06-10 | Stop reason: SDUPTHER

## 2024-06-10 RX ORDER — FENTANYL CITRATE 50 UG/ML
25 INJECTION, SOLUTION INTRAMUSCULAR; INTRAVENOUS PRN
Status: DISCONTINUED | OUTPATIENT
Start: 2024-06-10 | End: 2024-06-10 | Stop reason: HOSPADM

## 2024-06-10 RX ORDER — COLCHICINE 0.6 MG/1
0.6 TABLET ORAL SEE ADMIN INSTRUCTIONS
Status: DISCONTINUED | OUTPATIENT
Start: 2024-06-10 | End: 2024-06-11 | Stop reason: HOSPADM

## 2024-06-10 RX ORDER — TRANEXAMIC ACID 10 MG/ML
1000 INJECTION, SOLUTION INTRAVENOUS
Status: COMPLETED | OUTPATIENT
Start: 2024-06-10 | End: 2024-06-10

## 2024-06-10 RX ORDER — ASPIRIN 81 MG/1
81 TABLET ORAL 2 TIMES DAILY
Status: DISCONTINUED | OUTPATIENT
Start: 2024-06-10 | End: 2024-06-11 | Stop reason: HOSPADM

## 2024-06-10 RX ORDER — ROSUVASTATIN CALCIUM 20 MG/1
20 TABLET, COATED ORAL DAILY
Status: DISCONTINUED | OUTPATIENT
Start: 2024-06-10 | End: 2024-06-11 | Stop reason: HOSPADM

## 2024-06-10 RX ORDER — LABETALOL HYDROCHLORIDE 5 MG/ML
10 INJECTION, SOLUTION INTRAVENOUS
Status: DISCONTINUED | OUTPATIENT
Start: 2024-06-10 | End: 2024-06-10 | Stop reason: HOSPADM

## 2024-06-10 RX ORDER — SODIUM CHLORIDE 0.9 % (FLUSH) 0.9 %
5-40 SYRINGE (ML) INJECTION EVERY 12 HOURS SCHEDULED
Status: DISCONTINUED | OUTPATIENT
Start: 2024-06-10 | End: 2024-06-10 | Stop reason: HOSPADM

## 2024-06-10 RX ORDER — TRANEXAMIC ACID 10 MG/ML
1000 INJECTION, SOLUTION INTRAVENOUS ONCE
Status: COMPLETED | OUTPATIENT
Start: 2024-06-10 | End: 2024-06-10

## 2024-06-10 RX ORDER — NALOXONE HYDROCHLORIDE 0.4 MG/ML
INJECTION, SOLUTION INTRAMUSCULAR; INTRAVENOUS; SUBCUTANEOUS PRN
Status: DISCONTINUED | OUTPATIENT
Start: 2024-06-10 | End: 2024-06-10 | Stop reason: HOSPADM

## 2024-06-10 RX ORDER — SODIUM CHLORIDE 0.9 % (FLUSH) 0.9 %
5-40 SYRINGE (ML) INJECTION EVERY 12 HOURS SCHEDULED
Status: DISCONTINUED | OUTPATIENT
Start: 2024-06-10 | End: 2024-06-11 | Stop reason: HOSPADM

## 2024-06-10 RX ORDER — ASPIRIN 81 MG/1
81 TABLET ORAL 2 TIMES DAILY
Qty: 56 TABLET | Refills: 0 | Status: SHIPPED | OUTPATIENT
Start: 2024-06-10 | End: 2024-07-08

## 2024-06-10 RX ORDER — IPRATROPIUM BROMIDE AND ALBUTEROL SULFATE 2.5; .5 MG/3ML; MG/3ML
1 SOLUTION RESPIRATORY (INHALATION)
Status: DISCONTINUED | OUTPATIENT
Start: 2024-06-10 | End: 2024-06-10 | Stop reason: HOSPADM

## 2024-06-10 RX ORDER — SODIUM CHLORIDE 9 MG/ML
INJECTION, SOLUTION INTRAVENOUS PRN
Status: DISCONTINUED | OUTPATIENT
Start: 2024-06-10 | End: 2024-06-10 | Stop reason: HOSPADM

## 2024-06-10 RX ORDER — METOPROLOL SUCCINATE 25 MG/1
12.5 TABLET, EXTENDED RELEASE ORAL NIGHTLY
Status: DISCONTINUED | OUTPATIENT
Start: 2024-06-10 | End: 2024-06-11 | Stop reason: HOSPADM

## 2024-06-10 RX ORDER — ROCURONIUM BROMIDE 10 MG/ML
INJECTION, SOLUTION INTRAVENOUS PRN
Status: DISCONTINUED | OUTPATIENT
Start: 2024-06-10 | End: 2024-06-10 | Stop reason: SDUPTHER

## 2024-06-10 RX ORDER — HYDRALAZINE HYDROCHLORIDE 20 MG/ML
10 INJECTION INTRAMUSCULAR; INTRAVENOUS
Status: DISCONTINUED | OUTPATIENT
Start: 2024-06-10 | End: 2024-06-10 | Stop reason: HOSPADM

## 2024-06-10 RX ORDER — PHENYLEPHRINE HCL IN 0.9% NACL 1 MG/10 ML
SYRINGE (ML) INTRAVENOUS PRN
Status: DISCONTINUED | OUTPATIENT
Start: 2024-06-10 | End: 2024-06-10 | Stop reason: SDUPTHER

## 2024-06-10 RX ORDER — OXYCODONE HYDROCHLORIDE AND ACETAMINOPHEN 5; 325 MG/1; MG/1
1 TABLET ORAL EVERY 6 HOURS PRN
Qty: 28 TABLET | Refills: 0 | Status: SHIPPED | OUTPATIENT
Start: 2024-06-10 | End: 2024-06-17

## 2024-06-10 RX ORDER — SODIUM CHLORIDE 9 MG/ML
INJECTION, SOLUTION INTRAVENOUS PRN
Status: DISCONTINUED | OUTPATIENT
Start: 2024-06-10 | End: 2024-06-11 | Stop reason: HOSPADM

## 2024-06-10 RX ORDER — FENTANYL CITRATE 50 UG/ML
INJECTION, SOLUTION INTRAMUSCULAR; INTRAVENOUS PRN
Status: DISCONTINUED | OUTPATIENT
Start: 2024-06-10 | End: 2024-06-10 | Stop reason: SDUPTHER

## 2024-06-10 RX ORDER — SODIUM CHLORIDE 0.9 % (FLUSH) 0.9 %
5-40 SYRINGE (ML) INJECTION PRN
Status: DISCONTINUED | OUTPATIENT
Start: 2024-06-10 | End: 2024-06-10 | Stop reason: HOSPADM

## 2024-06-10 RX ORDER — PROPOFOL 10 MG/ML
INJECTION, EMULSION INTRAVENOUS PRN
Status: DISCONTINUED | OUTPATIENT
Start: 2024-06-10 | End: 2024-06-10 | Stop reason: SDUPTHER

## 2024-06-10 RX ORDER — ACETAMINOPHEN 500 MG
1000 TABLET ORAL ONCE
Status: COMPLETED | OUTPATIENT
Start: 2024-06-10 | End: 2024-06-10

## 2024-06-10 RX ORDER — ALBUTEROL SULFATE 2.5 MG/3ML
2.5 SOLUTION RESPIRATORY (INHALATION) EVERY 6 HOURS PRN
Status: DISCONTINUED | OUTPATIENT
Start: 2024-06-10 | End: 2024-06-11 | Stop reason: HOSPADM

## 2024-06-10 RX ORDER — KETOROLAC TROMETHAMINE 30 MG/ML
15 INJECTION, SOLUTION INTRAMUSCULAR; INTRAVENOUS ONCE
Status: COMPLETED | OUTPATIENT
Start: 2024-06-10 | End: 2024-06-10

## 2024-06-10 RX ORDER — ACETAMINOPHEN 325 MG/1
650 TABLET ORAL EVERY 6 HOURS
Status: DISCONTINUED | OUTPATIENT
Start: 2024-06-10 | End: 2024-06-11 | Stop reason: HOSPADM

## 2024-06-10 RX ORDER — SODIUM CHLORIDE 0.9 % (FLUSH) 0.9 %
5-40 SYRINGE (ML) INJECTION PRN
Status: DISCONTINUED | OUTPATIENT
Start: 2024-06-10 | End: 2024-06-11 | Stop reason: HOSPADM

## 2024-06-10 RX ORDER — ROPIVACAINE HYDROCHLORIDE 5 MG/ML
30 INJECTION, SOLUTION EPIDURAL; INFILTRATION; PERINEURAL ONCE
Status: COMPLETED | OUTPATIENT
Start: 2024-06-10 | End: 2024-06-10

## 2024-06-10 RX ORDER — MIDAZOLAM HYDROCHLORIDE 2 MG/2ML
1 INJECTION, SOLUTION INTRAMUSCULAR; INTRAVENOUS EVERY 5 MIN PRN
Status: DISCONTINUED | OUTPATIENT
Start: 2024-06-10 | End: 2024-06-10 | Stop reason: HOSPADM

## 2024-06-10 RX ORDER — DEXAMETHASONE SODIUM PHOSPHATE 4 MG/ML
INJECTION, SOLUTION INTRA-ARTICULAR; INTRALESIONAL; INTRAMUSCULAR; INTRAVENOUS; SOFT TISSUE PRN
Status: DISCONTINUED | OUTPATIENT
Start: 2024-06-10 | End: 2024-06-10 | Stop reason: SDUPTHER

## 2024-06-10 RX ORDER — ONDANSETRON 2 MG/ML
4 INJECTION INTRAMUSCULAR; INTRAVENOUS EVERY 6 HOURS PRN
Status: DISCONTINUED | OUTPATIENT
Start: 2024-06-10 | End: 2024-06-11 | Stop reason: HOSPADM

## 2024-06-10 RX ORDER — MEPERIDINE HYDROCHLORIDE 25 MG/ML
12.5 INJECTION INTRAMUSCULAR; INTRAVENOUS; SUBCUTANEOUS EVERY 5 MIN PRN
Status: DISCONTINUED | OUTPATIENT
Start: 2024-06-10 | End: 2024-06-10 | Stop reason: HOSPADM

## 2024-06-10 RX ORDER — FLUOXETINE HYDROCHLORIDE 20 MG/1
20 CAPSULE ORAL NIGHTLY
Status: DISCONTINUED | OUTPATIENT
Start: 2024-06-10 | End: 2024-06-11 | Stop reason: HOSPADM

## 2024-06-10 RX ORDER — ONDANSETRON 2 MG/ML
INJECTION INTRAMUSCULAR; INTRAVENOUS PRN
Status: DISCONTINUED | OUTPATIENT
Start: 2024-06-10 | End: 2024-06-10 | Stop reason: SDUPTHER

## 2024-06-10 RX ORDER — ONDANSETRON 4 MG/1
4 TABLET, ORALLY DISINTEGRATING ORAL EVERY 8 HOURS PRN
Status: DISCONTINUED | OUTPATIENT
Start: 2024-06-10 | End: 2024-06-11 | Stop reason: HOSPADM

## 2024-06-10 RX ADMIN — ROCURONIUM BROMIDE 10 MG: 10 INJECTION, SOLUTION INTRAVENOUS at 12:38

## 2024-06-10 RX ADMIN — SODIUM CHLORIDE: 9 INJECTION, SOLUTION INTRAVENOUS at 11:18

## 2024-06-10 RX ADMIN — ROCURONIUM BROMIDE 50 MG: 10 INJECTION, SOLUTION INTRAVENOUS at 11:20

## 2024-06-10 RX ADMIN — Medication 100 MCG: at 13:06

## 2024-06-10 RX ADMIN — MIDAZOLAM 2 MG: 1 INJECTION INTRAMUSCULAR; INTRAVENOUS at 11:18

## 2024-06-10 RX ADMIN — TRANEXAMIC ACID 1000 MG: 10 INJECTION, SOLUTION INTRAVENOUS at 14:32

## 2024-06-10 RX ADMIN — ONDANSETRON 4 MG: 2 INJECTION INTRAMUSCULAR; INTRAVENOUS at 13:06

## 2024-06-10 RX ADMIN — ACETAMINOPHEN 1000 MG: 500 TABLET ORAL at 09:08

## 2024-06-10 RX ADMIN — FENTANYL CITRATE 50 MCG: 50 INJECTION, SOLUTION INTRAMUSCULAR; INTRAVENOUS at 12:58

## 2024-06-10 RX ADMIN — WATER 2000 MG: 1 INJECTION INTRAMUSCULAR; INTRAVENOUS; SUBCUTANEOUS at 21:04

## 2024-06-10 RX ADMIN — WATER 2000 MG: 1 INJECTION INTRAMUSCULAR; INTRAVENOUS; SUBCUTANEOUS at 11:27

## 2024-06-10 RX ADMIN — PROPOFOL 200 MG: 10 INJECTION, EMULSION INTRAVENOUS at 11:20

## 2024-06-10 RX ADMIN — KETOROLAC TROMETHAMINE 15 MG: 30 INJECTION, SOLUTION INTRAMUSCULAR at 09:08

## 2024-06-10 RX ADMIN — KETOROLAC TROMETHAMINE 15 MG: 15 INJECTION, SOLUTION INTRAMUSCULAR; INTRAVENOUS at 22:37

## 2024-06-10 RX ADMIN — FLUOXETINE HYDROCHLORIDE 20 MG: 20 CAPSULE ORAL at 21:06

## 2024-06-10 RX ADMIN — Medication 200 MCG: at 13:18

## 2024-06-10 RX ADMIN — ROCURONIUM BROMIDE 20 MG: 10 INJECTION, SOLUTION INTRAVENOUS at 12:07

## 2024-06-10 RX ADMIN — ROSUVASTATIN CALCIUM 20 MG: 20 TABLET, FILM COATED ORAL at 21:06

## 2024-06-10 RX ADMIN — SODIUM CHLORIDE, PRESERVATIVE FREE 10 ML: 5 INJECTION INTRAVENOUS at 21:08

## 2024-06-10 RX ADMIN — SODIUM CHLORIDE: 9 INJECTION, SOLUTION INTRAVENOUS at 13:45

## 2024-06-10 RX ADMIN — LIDOCAINE HYDROCHLORIDE 80 MG: 20 INJECTION, SOLUTION EPIDURAL; INFILTRATION; INTRACAUDAL; PERINEURAL at 11:20

## 2024-06-10 RX ADMIN — SUGAMMADEX 200 MG: 100 INJECTION, SOLUTION INTRAVENOUS at 13:42

## 2024-06-10 RX ADMIN — ALLOPURINOL 300 MG: 300 TABLET ORAL at 22:37

## 2024-06-10 RX ADMIN — DEXAMETHASONE SODIUM PHOSPHATE 10 MG: 4 INJECTION, SOLUTION INTRAMUSCULAR; INTRAVENOUS at 11:26

## 2024-06-10 RX ADMIN — KETOROLAC TROMETHAMINE 15 MG: 15 INJECTION, SOLUTION INTRAMUSCULAR; INTRAVENOUS at 16:27

## 2024-06-10 RX ADMIN — ROPIVACAINE HYDROCHLORIDE 30 ML: 5 INJECTION EPIDURAL; INFILTRATION; PERINEURAL at 09:41

## 2024-06-10 RX ADMIN — TRANEXAMIC ACID 1000 MG: 10 INJECTION, SOLUTION INTRAVENOUS at 11:30

## 2024-06-10 RX ADMIN — MIDAZOLAM HYDROCHLORIDE 2 MG: 1 INJECTION, SOLUTION INTRAMUSCULAR; INTRAVENOUS at 09:35

## 2024-06-10 RX ADMIN — ACETAMINOPHEN 650 MG: 325 TABLET ORAL at 22:37

## 2024-06-10 RX ADMIN — ASPIRIN 81 MG: 81 TABLET, COATED ORAL at 21:06

## 2024-06-10 RX ADMIN — Medication 200 MCG: at 13:27

## 2024-06-10 RX ADMIN — FENTANYL CITRATE 50 MCG: 50 INJECTION, SOLUTION INTRAMUSCULAR; INTRAVENOUS at 12:40

## 2024-06-10 RX ADMIN — ACETAMINOPHEN 650 MG: 325 TABLET ORAL at 16:27

## 2024-06-10 RX ADMIN — FENTANYL CITRATE 100 MCG: 50 INJECTION, SOLUTION INTRAMUSCULAR; INTRAVENOUS at 11:20

## 2024-06-10 RX ADMIN — METOPROLOL SUCCINATE 12.5 MG: 25 TABLET, FILM COATED, EXTENDED RELEASE ORAL at 21:06

## 2024-06-10 ASSESSMENT — PAIN SCALES - GENERAL
PAINLEVEL_OUTOF10: 2
PAINLEVEL_OUTOF10: 0
PAINLEVEL_OUTOF10: 0
PAINLEVEL_OUTOF10: 2
PAINLEVEL_OUTOF10: 2

## 2024-06-10 ASSESSMENT — PAIN - FUNCTIONAL ASSESSMENT
PAIN_FUNCTIONAL_ASSESSMENT: ACTIVITIES ARE NOT PREVENTED
PAIN_FUNCTIONAL_ASSESSMENT: 0-10
PAIN_FUNCTIONAL_ASSESSMENT: ACTIVITIES ARE NOT PREVENTED
PAIN_FUNCTIONAL_ASSESSMENT: ACTIVITIES ARE NOT PREVENTED

## 2024-06-10 ASSESSMENT — PAIN DESCRIPTION - PAIN TYPE: TYPE: SURGICAL PAIN

## 2024-06-10 ASSESSMENT — PAIN DESCRIPTION - ORIENTATION
ORIENTATION: RIGHT
ORIENTATION: RIGHT

## 2024-06-10 ASSESSMENT — PAIN DESCRIPTION - DESCRIPTORS
DESCRIPTORS: ACHING
DESCRIPTORS: ACHING;DISCOMFORT

## 2024-06-10 ASSESSMENT — PAIN DESCRIPTION - LOCATION
LOCATION: KNEE
LOCATION: KNEE

## 2024-06-10 NOTE — ANESTHESIA PROCEDURE NOTES
Peripheral Block    Patient location during procedure: procedure area  Reason for block: post-op pain management  Start time: 6/10/2024 9:39 AM  End time: 6/10/2024 9:43 AM  Staffing  Performed: anesthesiologist   Anesthesiologist: Pallavi Perez DO  Performed by: Pallavi Perez DO  Authorized by: Pallavi Perez DO    Preanesthetic Checklist  Completed: patient identified, IV checked, site marked, risks and benefits discussed, surgical/procedural consents, equipment checked, pre-op evaluation, timeout performed, anesthesia consent given, oxygen available and monitors applied/VS acknowledged  Peripheral Block   Patient position: supine  Prep: ChloraPrep  Provider prep: mask and sterile gloves  Patient monitoring: cardiac monitor, frequent blood pressure checks and IV access  Block type: Femoral  Adductor canal  Laterality: right  Injection technique: single-shot  Guidance: ultrasound guided  Local infiltration: ropivacaine  Infiltration strength: 0.5 %  Local infiltration: ropivacaine  Dose: 30 mL    Needle   Needle type: combined needle/nerve stimulator   Needle gauge: 22 G  Needle localization: ultrasound guidance  Assessment   Injection assessment: negative aspiration for heme, no paresthesia on injection, local visualized surrounding nerve on ultrasound and no intravascular symptoms  Paresthesia pain: none  Slow fractionated injection: yes  Hemodynamics: stable  Outcomes: uncomplicated and patient tolerated procedure well

## 2024-06-10 NOTE — OP NOTE
OPERATIVE REPORT    PATIENT:  Chance Helton  [id number]    DATE OF PROCEDURE:  6/10/24    SURGEON: Wili Hardwick MD    ASSISTANT:  Glen Wu DO, resident assisting     PREOPERATIVE DIAGNOSIS: Degenerative joint disease , Right knee.     POSTOPERATIVE DIAGNOSIS: Degenerative joint disease,  Rightknee.     OPERATION: Dejon Robot Assisted Right total knee arthroplasty     ANESTHESIA: . choice and ACB    OPERATIVE INDICATIONS:  The patient is a 73 year old male with end stage degenerative joint disease involving the knee, for which more conservative non operative management has failed.  The patient is thus indicated for total knee arthroplasty.  We discussed use of the Dejon robot for assistance.  The patient was agreeable.  The risks and benefits, as well as alternatives were discussed at length with the patient in detail.  These risks include, but are not limited to infection, nerve and/or blood vessel injury, intra or post operative fracture, need for revision surgery, failure of implants, deep vein thrombosis and pulmonary embolism, athrofibrosis, and the risks of general anesthesia provided by the anesthesiologist.   The patient understood these risks, signed an informed consent, and elected to proceed    OPERATIVE FINDINGS:   There was extensive eburnation of bone, and full thickness cartilage loss over the femoral and tibial condyles.      OPERATIVE PROCEDURE: After satisfactory spinal anesthesia, the patient was placed supine on the operative table.  A Bump was placed under the operative leg and a tourniquet was placed high on the operative thigh.  The operative extremity was prepped and draped in sterile fashion.  Prior to procedure start, a time out was performed including confirmation of operative site and operation to be performed.  Antibiotic prophylaxis, 2 g Ancef was given prior to incision, as was 1 g of transexamic acid.      The leg was exsanguinated with an Esmarch bandage. The tourniquet was

## 2024-06-10 NOTE — CARE COORDINATION
Met with patient about diagnosis and discharge plan of care. Post op right TKA. Pt seen in ortho class. Lives in 2 story home with spouse. Bed on 2nd floor-13 steps/rails, bath on 1st floor. 3 steps/rails into home. Ordered wheeled walker through [facility]. Has standard commode and tub shower. Plan is home with [facility]-notified and orders completed. PCP is Dr Elias. Will follow-o

## 2024-06-10 NOTE — H&P
Department of Orthopedic Surgery  Attending Pre-operative History and Physical        DIAGNOSIS: Right knee osteoarthritis    INDICATION:  Failed Conservative Management      PROCEDURE: Right knee Dejon robot assisted total knee arthroplasty    CHIEF COMPLAINT: Right knee pain    History Obtained From:  patient    HISTORY OF PRESENT ILLNESS:      The patient is a 73 y.o. male with significant past medical history of right knee end-stage osteoarthritis that has failed conservative treatment.  He is therefore interested in total knee arthroplasty.  We discussed the use of the Dejon robot.  We also discussed the risks, benefits, alternatives to surgery.  Risks include but are not limited to infection, neurovascular injury, DVT/pulm embolus, arthrofibrosis, need for revision surgery, unforeseen risks etc.  He understands and would like to proceed     Past Medical History:        Diagnosis Date    Awareness under anesthesia     CAD (coronary artery disease)     COPD (chronic obstructive pulmonary disease) (HCC)     Hyperlipidemia     Hypertension        Past Surgical History:        Procedure Laterality Date    BACK SURGERY  2015    spur removal lower back    CORONARY ARTERY BYPASS GRAFT  06/09/2011    ACB X 5- DR. ROBLEDO    DIAGNOSTIC CARDIAC CATH LAB PROCEDURE  06/08/2011    DR. VÁSQUEZ    TONSILLECTOMY AND ADENOIDECTOMY         Medications Prior to Admission:   Medications Prior to Admission: VITAMIN D PO, Take 50,000 Units by mouth once a week Every friday  rosuvastatin (CRESTOR) 20 MG tablet, Take 1 tablet by mouth daily  albuterol sulfate HFA (PROVENTIL;VENTOLIN;PROAIR) 108 (90 Base) MCG/ACT inhaler, 1-2 puffs q4-6hrs prn  allopurinol (ZYLOPRIM) 300 MG tablet, Take 1 tablet by mouth daily  FLUoxetine (PROZAC) 20 MG capsule, Take 1 capsule by mouth daily (Patient taking differently: Take 1 capsule by mouth at bedtime)  tiotropium (SPIRIVA) 18 MCG inhalation capsule, Inhale 1 capsule into the lungs daily (Patient

## 2024-06-10 NOTE — ACP (ADVANCE CARE PLANNING)
Advance Care Planning   Healthcare Decision Maker:    Primary Decision Maker: Rose Helton - [facility] - [phone]    Click here to complete Healthcare Decision Makers including selection of the Healthcare Decision Maker Relationship (ie \"Primary\").

## 2024-06-11 VITALS
WEIGHT: 199 LBS | SYSTOLIC BLOOD PRESSURE: 133 MMHG | BODY MASS INDEX: 33.97 KG/M2 | TEMPERATURE: 98.6 F | OXYGEN SATURATION: 97 % | HEART RATE: 72 BPM | RESPIRATION RATE: 16 BRPM | DIASTOLIC BLOOD PRESSURE: 64 MMHG | HEIGHT: 64 IN

## 2024-06-11 LAB
ERYTHROCYTE [DISTWIDTH] IN BLOOD BY AUTOMATED COUNT: 13.3 % (ref 11.5–15)
HCT VFR BLD AUTO: 40.7 % (ref 37–54)
HGB BLD-MCNC: 13.7 G/DL (ref 12.5–16.5)
MCH RBC QN AUTO: 30.8 PG (ref 26–35)
MCHC RBC AUTO-ENTMCNC: 33.7 G/DL (ref 32–34.5)
MCV RBC AUTO: 91.5 FL (ref 80–99.9)
PLATELET # BLD AUTO: 138 K/UL (ref 130–450)
PMV BLD AUTO: 9.7 FL (ref 7–12)
RBC # BLD AUTO: 4.45 M/UL (ref 3.8–5.8)
WBC OTHER # BLD: 13.1 K/UL (ref 4.5–11.5)

## 2024-06-11 PROCEDURE — 97165 OT EVAL LOW COMPLEX 30 MIN: CPT

## 2024-06-11 PROCEDURE — 6370000000 HC RX 637 (ALT 250 FOR IP): Performed by: NURSE PRACTITIONER

## 2024-06-11 PROCEDURE — 6360000002 HC RX W HCPCS: Performed by: NURSE PRACTITIONER

## 2024-06-11 PROCEDURE — 85027 COMPLETE CBC AUTOMATED: CPT

## 2024-06-11 PROCEDURE — 97116 GAIT TRAINING THERAPY: CPT

## 2024-06-11 PROCEDURE — 97110 THERAPEUTIC EXERCISES: CPT

## 2024-06-11 PROCEDURE — G0378 HOSPITAL OBSERVATION PER HR: HCPCS

## 2024-06-11 PROCEDURE — 36415 COLL VENOUS BLD VENIPUNCTURE: CPT

## 2024-06-11 PROCEDURE — 6360000002 HC RX W HCPCS

## 2024-06-11 PROCEDURE — 97530 THERAPEUTIC ACTIVITIES: CPT

## 2024-06-11 PROCEDURE — 2580000003 HC RX 258

## 2024-06-11 PROCEDURE — 96374 THER/PROPH/DIAG INJ IV PUSH: CPT

## 2024-06-11 RX ADMIN — KETOROLAC TROMETHAMINE 15 MG: 15 INJECTION, SOLUTION INTRAMUSCULAR; INTRAVENOUS at 05:08

## 2024-06-11 RX ADMIN — WATER 2000 MG: 1 INJECTION INTRAMUSCULAR; INTRAVENOUS; SUBCUTANEOUS at 05:08

## 2024-06-11 RX ADMIN — ASPIRIN 81 MG: 81 TABLET, COATED ORAL at 08:18

## 2024-06-11 RX ADMIN — ACETAMINOPHEN 650 MG: 325 TABLET ORAL at 05:07

## 2024-06-11 ASSESSMENT — PAIN DESCRIPTION - LOCATION: LOCATION: KNEE

## 2024-06-11 ASSESSMENT — PAIN SCALES - GENERAL
PAINLEVEL_OUTOF10: 2
PAINLEVEL_OUTOF10: 4

## 2024-06-11 ASSESSMENT — PAIN DESCRIPTION - ORIENTATION: ORIENTATION: RIGHT

## 2024-06-11 ASSESSMENT — PAIN DESCRIPTION - DESCRIPTORS: DESCRIPTORS: ACHING;DISCOMFORT;SORE;TENDER

## 2024-06-11 NOTE — DISCHARGE SUMMARY
Physician Discharge Summary     Patient ID:  Chance Helton  [id number]  73 y.o.  1950    Admit date: 6/10/2024    Discharge date and time: 6/11/2024 11:31 AM     Admitting Physician: Wili Hardwick MD     Discharge Physician: Wili Hardwick MD    Admission Diagnoses: Degenerative arthritis of right knee [M17.11]  Status post right knee replacement [Z96.651]    Discharge Diagnoses: Degenerative arthritis of right knee [M17.11]  Status post right knee replacement [Z96.651]    Admission Condition: good    Discharged Condition: good    Surgery: Right Dejon robotic assisted total knee arthroplasty    Hospital Course: The patient was admitted for elective joint replacement.  The patient underwent routine right Dejon robotic assisted total knee arthroplasty with anesthesia and was transferred to the orthopaedic floor from PACU following surgery.  The post operative hospital course was unremarkable.  The patient worked with PT/OT daily to improve mobility.  Pain was controlled and DVT prophylaxis was with SCD's and ASA 81 BID.  The patient was discharged on POD 1 to home.      Consults: PCP, PT/OT, Case management     Significant Diagnostic Studies:   Post operative xray showed components in good position     Treatments:   IV hydration  antibiotics: Ancef perioperatively for 24 hours  analgesia: oral and IV narcotics; toradol  anticoagulation: ASA 81 BID  therapies: PT, OT and    surgery: as above     Discharge Exam:  Operative limb incision was clean, dry, and intact.  Leg swelling was minimal.  Motor and sensory were intact throughout the operative leg.      Disposition: home    Patient Instructions:   Discharge Medication List as of 6/11/2024  8:44 AM        START taking these medications    Details   oxyCODONE-acetaminophen (PERCOCET) 5-325 MG per tablet Take 1 tablet by mouth every 6 hours as needed for Pain for up to 7 days. Intended supply: 7 days. Take lowest dose possible to manage pain Max Daily

## 2024-06-11 NOTE — ANESTHESIA POSTPROCEDURE EVALUATION
Department of Anesthesiology  Postprocedure Note    Patient: Chance Helton  MRN: [id number]  YOB: 1950  Date of evaluation: 6/10/2024    Procedure Summary       Date: 06/10/24 Room / Location: [address]    Anesthesia Start: 1118 Anesthesia Stop: 1400    Procedure: RIGHT KNEE SHILA ROBOTIC ASSISTED TOTAL ARTHROPLASTY (Right: Knee) Diagnosis:       Degenerative arthritis of right knee      (Degenerative arthritis of right knee [M17.11])    Surgeons: Wili Hardwick MD Responsible Provider: Pallavi Perez DO    Anesthesia Type: regional, spinal ASA Status: 3            Anesthesia Type: No value filed.    Natalie Phase I: Natalie Score: 10    Natalie Phase II:      Anesthesia Post Evaluation    Patient location during evaluation: PACU  Patient participation: complete - patient participated  Level of consciousness: awake  Airway patency: patent  Nausea & Vomiting: no nausea and no vomiting  Cardiovascular status: hemodynamically stable  Respiratory status: acceptable  Hydration status: stable  Pain management: adequate    No notable events documented.

## 2024-06-11 NOTE — CARE COORDINATION
Letter has been signed. Please call patient and let her know. Updated plan of care. POD#1 right TKA. Awaiting wheeled walker. Plan is home with [facility]. [place]

## 2024-06-11 NOTE — PROGRESS NOTES
P Quality Flow/Interdisciplinary Rounds Progress Note        Quality Flow Rounds held on June 11, 2024    Disciplines Attending:  Bedside Nurse, , , and Nursing Unit Leadership    Chance Helton was admitted on 6/10/2024  8:07 AM    Anticipated Discharge Date:       Disposition:    Troy Score:  Troy Scale Score: 20    Readmission Risk              Risk of Unplanned Readmission:  0           Discussed patient goal for the day, patient clinical progression, and barriers to discharge.  The following Goal(s) of the Day/Commitment(s) have been identified:   Discharge Planning      Cheryl Amin RN  June 11, 2024        
0835 procedure began for PNB.   0840 procedure ended. Vs stable. Denies pain. Tolerated well  Wife at bedside.   
CLINICAL PHARMACY NOTE: MEDS TO BEDS    Total # of Prescriptions Filled: 2   The following medications were delivered to the patient:  Percocet 5/325 mg   Aspirin 81 mg     Additional Documentation:    
Database initiated pharmacy and medications verified with the patient. He is a&O independent from home with wife.   
Discharge instructions discussed with patient and wife, verbalized understanding. Awaiting wheeled walker delivery.  
ORTHOPAEDIC SURGERY  Progress Note    CC: post op TKA    Subjective: Patient is alert and oriented x3, calm and cooperative showing no signs of acute distress.  Reports no overnight issues.     Vitals  VITALS:  BP (!) 141/82   Pulse 72   Temp 98.1 °F (36.7 °C) (Oral)   Resp 16   Ht 1.626 m (5' 4\")   Wt 90.3 kg (199 lb)   SpO2 98%   BMI 34.16 kg/m²   24HR INTAKE/OUTPUT:    Intake/Output Summary (Last 24 hours) at 6/11/2024 0628  Last data filed at 6/11/2024 0355  Gross per 24 hour   Intake 1000 ml   Output 1200 ml   Net -200 ml       PHYSICAL EXAM:    Orientation:  alert and oriented to person, place and time    Right Lower Extremity    Incision:  dressing in place, clean, dry and intact    Lower Extremity Motor :  Dorsiflexion:  5/5  Plantarflexion:  5/5  EHL:  5/5    Lower Extremity Sensory: intact L4-S1 distribution     Pulses:  Foot warm/well perfused    Abnormal Exam findings:  none      LABS:    CBC:   Lab Results   Component Value Date/Time    WBC 13.1 06/11/2024 04:23 AM    RBC 4.45 06/11/2024 04:23 AM    HGB 13.7 06/11/2024 04:23 AM    HCT 40.7 06/11/2024 04:23 AM    MCV 91.5 06/11/2024 04:23 AM    MCH 30.8 06/11/2024 04:23 AM    MCHC 33.7 06/11/2024 04:23 AM    RDW 13.3 06/11/2024 04:23 AM     06/11/2024 04:23 AM    MPV 9.7 06/11/2024 04:23 AM       ASSESSMENT AND PLAN:    Post operative day 1 status post right total Knee arthroplasty    - Weight bearing as tolerated  - Deep venous thrombosis prophylaxis - ASA 81 BID, SCD's. Edwin hose bilateral, thigh high  - Continue physical therapy  - Pain Control: Wean to oral meds   - Dressing change: Aquacel protocol  - D/C Plan:  Home Health, anticipate DC today following PT. Follow up in 1 week.      ELIANA Oliveira-CNP  Orthopaedic Surgery   6/11/24  6:28 AM              
Occupational Therapy    OCCUPATIONAL THERAPY INITIAL EVALUATION    [facility]   [address], [place]         Date:2024                                                  Patient Name: Chance Helton    MRN: [id number]    : 1950    Room: [address] [place] [address]      Evaluating OT: Yarelis Bell OTR/L   [id number]      Referring Provider:Raul Barnett APRN - CNP     Specific Provider Orders/Date:OT eval and treat 6/10/2024      Diagnosis:  Degenerative arthritis of right knee [M17.11]  Status post right knee replacement [Z96.651]    Surgery: R TKA      Pertinent Medical History: COPD, HTN, back surgery      Precautions:  Fall Risk, WBAT R LE      Assessment of current deficits    [x] Functional mobility  [x]ADLs  [x] Strength               []Cognition    [x] Functional transfers   [x] IADLs         [x] Safety Awareness   [x]Endurance    [] Fine Coordination              [x] Balance      [] Vision/perception   []Sensation     []Gross Motor Coordination  [] ROM  [] Delirium                   [] Motor Control     OT PLAN OF CARE   OT POC based on physician orders, patient diagnosis and results of clinical assessment    Frequency/Duration  1-2 days/wk for 2 - 4weeks PRN   Specific OT Treatment Interventions to include:   ADL retraining/adapted techniques and AE recommendations to increase functional independence within precautions                    Energy conservation techniques to improve tolerance for selfcare routine   Functional transfer/mobility training/DME recommendations for increased independence, safety and fall prevention         Patient/family education to increase safety and functional independence             Environmental modifications for safe mobility and completion of ADLs                             Therapeutic activity to improve functional performance during ADLs.                                         Therapeutic exercise to improve 
Physical Therapy  Facility/Department: [address] INTERMEDIATE MED SURG  Treatment Note  Name: Chance Helton  : 1950  MRN: [id number]  Date of Service: 2024    Equipment Recommendations: Wheeled walker    Evaluating Therapist: Mariia Chavis PT     Referring Provider:  Raul Barnett, ELIANA - CNP    PT order : PT eval and treat     Room #: 746  DIAGNOSIS: The primary encounter diagnosis was Status post right knee replacement. Diagnoses of Pre-op testing and Degenerative arthritis of right knee were also pertinent to this visit.  SURGERY: R TKA 6/10/24  PRECAUTIONS: falls, WBAT RLE    Social:  Pt lives with spouse in a 2 floor plan 3 steps and \"something to hold onto\" to enter. There are 12 steps and 1 rail to second floor bed and bath. He states he can sleep in his recliner and has a 1/2 bath on first floor if needed.    Prior to admission pt walked with no AD.  He has a cane if needed.      Initial Evaluation  Date:  6/10/2024  Treatment      Short Term/ Long Term   Goals   Was pt agreeable to Eval/treatment?  Yes  yes    Does pt have pain?  Minimal R knee pain 2-3/10 R knee pain    Bed Mobility  Rolling:  SBA   Supine to sit: SBA   Sit to supine:  NT   Scooting:  independent in sit  Rolling: Independent  Supine to sit: Independent  Sit to supine: NA  Scooting: Independent   Independent    Transfers Sit to stand:  CGA /min assist   Stand to sit:  CGA   Stand pivot:  NT  Sit to stand: Independent  Stand to sit: Independent  Stand pivot: Independent with ww  Independent    Ambulation     120  feet with  ww  with  CGA/min assist  400 feet with ww supervision  200  feet with  ww  with  independent    Stair negotiation: ascended and descended NT  8 steps with 1 rail and SPC supervision  4-12  steps with  1  rail with  SBA    AM- PAC RAW score       Pt is alert and oriented x4  BLE ROM is WFL, except R knee is 0-95°  LLE Strength is grossly 4+/5 and RLE is grossly 3-/5 to 4/5  Balance: is 
Report called to floor RN  
    Patient and or family understand(s) diagnosis, prognosis, and plan of care. -  yes     PLAN  PT care will be provided in accordance with the objectives noted above.  Whenever appropriate, clear delegation orders will be provided for nursing staff.  Exercises and functional mobility practice will be used as well as appropriate assistive devices or modalities to obtain goals. Patient and family education will also be administered as needed.        PLAN OF CARE:    Current Treatment Recommendations     [x] Strengthening to improve independence with functional mobility   [x] ROM to improve independence with functional mobility   [x] Balance Training to improve static/dynamic balance and to reduce fall risk  [x] Endurance Training to improve activity tolerance during functional mobility   [x] Transfer Training to improve safety and independence with all functional transfers   [x] Gait Training to improve gait mechanics, endurance and assess need for appropriate assistive device  [x] Stair Training in preparation for safe discharge home and/or into the community   [x] Positioning to prevent skin breakdown and contractures  [x] Safety and Education Training   [x] Patient/Caregiver Education   [] HEP  [] Other     Frequency of treatments will be BID x 2 days.    Time in:  1700  Time out: 1725       Evaluation Time includes thorough review of current medical information, gathering information on past medical history/social history and prior level of function, completion of standardized testing/informal observation of tasks, assessment of data and education on plan of care and goals.    CPT codes:  [x] Low Complexity PT evaluation 58328  [] Moderate Complexity PT evaluation 17984  [] High Complexity PT evaluation 79336  [] PT Re-evaluation 68610  [] Gait training 83356  minutes  [x] Therapeutic activities 52462 10 minutes  [] Therapeutic exercises 86059  minutes  [] Neuromuscular reeducation 52508  minutes       Mariia

## 2024-06-11 NOTE — PLAN OF CARE
Problem: Chronic Conditions and Co-morbidities  Goal: Patient's chronic conditions and co-morbidity symptoms are monitored and maintained or improved  Outcome: Progressing  Flowsheets (Taken 6/10/2024 1530 by Emir Chilel, RN)  Care Plan - Patient's Chronic Conditions and Co-Morbidity Symptoms are Monitored and Maintained or Improved: Monitor and assess patient's chronic conditions and comorbid symptoms for stability, deterioration, or improvement     Problem: Discharge Planning  Goal: Discharge to home or other facility with appropriate resources  Outcome: Progressing  Flowsheets (Taken 6/10/2024 1519 by Talia Patel, RN)  Discharge to home or other facility with appropriate resources: Refer to discharge planning if patient needs post-hospital services based on physician order or complex needs related to functional status, cognitive ability or social support system     Problem: Pain  Goal: Verbalizes/displays adequate comfort level or baseline comfort level  Outcome: Progressing     Problem: Safety - Adult  Goal: Free from fall injury  Outcome: Progressing     Problem: ABCDS Injury Assessment  Goal: Absence of physical injury  Outcome: Progressing

## 2024-06-18 DIAGNOSIS — Z96.651 STATUS POST RIGHT KNEE REPLACEMENT: ICD-10-CM

## 2024-06-18 RX ORDER — OXYCODONE HYDROCHLORIDE AND ACETAMINOPHEN 5; 325 MG/1; MG/1
1 TABLET ORAL EVERY 6 HOURS PRN
Qty: 28 TABLET | Refills: 0 | Status: SHIPPED
Start: 2024-06-18 | End: 2024-06-19 | Stop reason: SDUPTHER

## 2024-06-18 NOTE — TELEPHONE ENCOUNTER
Pt wife phoned in requesting medication refill,     Surgery:  Dejon Robot Assisted Right total knee arthroplasty   Date:6/10/24    1st refill postop

## 2024-06-19 DIAGNOSIS — Z96.651 STATUS POST RIGHT KNEE REPLACEMENT: ICD-10-CM

## 2024-06-19 LAB — SURGICAL PATHOLOGY REPORT: NORMAL

## 2024-06-19 RX ORDER — OXYCODONE HYDROCHLORIDE AND ACETAMINOPHEN 5; 325 MG/1; MG/1
1 TABLET ORAL EVERY 6 HOURS PRN
Qty: 28 TABLET | Refills: 0 | Status: SHIPPED | OUTPATIENT
Start: 2024-06-19 | End: 2024-06-26

## 2024-06-21 ENCOUNTER — OFFICE VISIT (OUTPATIENT)
Dept: ORTHOPEDIC SURGERY | Age: 74
End: 2024-06-21

## 2024-06-21 DIAGNOSIS — Z96.651 S/P TOTAL KNEE ARTHROPLASTY, RIGHT: Primary | ICD-10-CM

## 2024-06-21 PROCEDURE — 99024 POSTOP FOLLOW-UP VISIT: CPT | Performed by: NURSE PRACTITIONER

## 2024-06-21 NOTE — PROGRESS NOTES
[facility]  ORTHOPAEDICS AND SPORTS MEDICINE  DATE OF VISIT: 06/21/24  Follow Up Post Operative Visit     Follow Up Post Operative Visit     CHIEF COMPLAINT:   Chief Complaint   Patient presents with    Follow-up     RIGHT KNEE DEJON ROBOTIC ASSISTED TOTAL ARTHROPLASTY 6/10/24    Doing OK   Therapy going good         Surgery: Dejon Robot Assisted Right total knee arthroplasty    Date: 06/10/24     Subjective:    Chance Helton is here for follow up visit s/p above procedure.  He is doing well.  He has been progressing well with home health PT.    Controlled Substances Monitoring:        Physical Exam:    No data recorded    General: Alert and oriented x3, no acute distress  Cardiovascular/pulmonary: No labored breathing, peripheral perfusion intact  Musculoskeletal:    Right knee exam display stable postoperative swelling.  Neurovascular sensation is grossly intact.  Incision sites are closed edges well-approximated no active drainage present.  Patient can perform active knee flexion to 90 degrees      Imaging: X-ray including 4 views right knee display stable appearance of total knee replacement without complication    Assessment and Plan: Status post right Dejon robotic assisted total knee arthroplasty      Patient is postop week 1 from procedure listed above doing very well.  Postop imaging obtained and reviewed with patient today.  Surgical dressing and sutures were removed new Steri-Strips were applied.  Patient can shower for basic hygiene purposes and will not submerge incision site until mature scars present.  He will now transition to outpatient physical therapy.      ELIANA Oliveira-CNP  Orthopedic Surgery   06/21/24  1:03 PM

## 2024-07-10 ENCOUNTER — HOSPITAL ENCOUNTER (OUTPATIENT)
Dept: PHYSICAL THERAPY | Age: 74
Setting detail: THERAPIES SERIES
Discharge: HOME | End: 2024-07-10
Payer: MEDICARE

## 2024-07-10 PROCEDURE — 97161 PT EVAL LOW COMPLEX 20 MIN: CPT | Performed by: PHYSICAL THERAPIST

## 2024-07-10 PROCEDURE — 97110 THERAPEUTIC EXERCISES: CPT | Performed by: PHYSICAL THERAPIST

## 2024-07-10 ASSESSMENT — PAIN DESCRIPTION - ORIENTATION: ORIENTATION: RIGHT

## 2024-07-10 ASSESSMENT — PAIN SCALES - GENERAL: PAINLEVEL_OUTOF10: 5

## 2024-07-10 ASSESSMENT — PAIN DESCRIPTION - LOCATION: LOCATION: KNEE

## 2024-07-10 ASSESSMENT — PAIN DESCRIPTION - DESCRIPTORS: DESCRIPTORS: ACHING;BURNING

## 2024-07-10 ASSESSMENT — PAIN DESCRIPTION - PAIN TYPE: TYPE: SURGICAL PAIN

## 2024-07-10 NOTE — PROGRESS NOTES
[facility]                Phone: [phone]   Fax: [phone]    Physical Therapy Daily Treatment Note  Date:  7/10/2024    Patient Name:  Chance Helton    :  1950  MRN: [id number]    Evaluating therapist:  GRACIE White    (7/10/24)  Restrictions/Precautions:    Diagnosis:  s/p R TKA  (6/10/24)  Treatment Diagnosis:    Insurance/Certification information:  [facility] Medicare         cert dates:  7/10/24  to  10/10/24            ICD-10:  Z96.651  Referring Physician:  LIBERTAD Hardwick  Plan of care signed (Y/N):  Y  Visit# / total visits:    Pain level: 4/10   Time In:  Time Out:    Subjective:      Exercises:  Exercise/Equipment Resistance/Repetitions Other comments   StepOne              heel slides 20x3s    ankle pumps 2x30s    quad sets 20x3s    SAQ [id number]    SLR 2x10           sit/stand              toe raises     step ups              side                                                  Other Therapeutic Activities:      Home Exercise Program:  provided 7/10/24    Manual Treatments:      Modalities:  IFC/ICE to R knee PRN     Timed Code Treatment Minutes:      Total Treatment Minutes:      Treatment/Activity Tolerance:  [] Patient tolerated treatment well [] Patient limited by fatique  [] Patient limited by pain  [] Patient limited by other medical complications  [] Other:     Prognosis: [] Good [] Fair  [] Poor    Patient Requires Follow-up: [] Yes  [] No    Plan:   [] Continue per plan of care [] Alter current plan (see comments)  [] Plan of care initiated [] Hold pending MD visit [] Discharge  Plan for Next Session:      See Weekly Progress Note: []  Yes  []  No  Next due:        Electronically signed by:  Endy Aleman, PT   
Observations  Description: clean/approximated incision with manageable swelling across ant pole of knee       Ambulation/Gait (if applicable):   Ambulation  Surface: Level tile  Device: Single point cane  Assistance: Independent  Quality of Gait: slow/guarded dee with short/equal strides and decreased stance and heel-toe progression noted R LE    Balance Screen:   Balance  Comments: static/dynamic balance is GOOD-       Endurance:  GOOD- for all prolonged activities    Neuro Screen:   Sensation      Sensation  Overall Sensation Status: WNL     Left AROM  Right AROM              AROM RLE (degrees)  R Knee Flexion (0-145): 92*  R Knee Extension (0): 0*       Left Strength  Right Strength              Strength RLE  R Knee Flexion: 3+/5  R Knee Extension: 3+/5         ASSESSMENT     Impression: Assessment: Pain noted across R knee with all prolonged activities, 5/10    Body Structures, Functions, Activity Limitations Requiring Skilled Therapeutic Intervention: Decreased ROM, Decreased strength, Decreased functional mobility , Decreased endurance    Statement of Medical Necessity: Physical Therapy is both indicated and medically necessary as outlined in the POC to increase the likelihood of meeting the functionally related goals stated below.     Patient's Activity Tolerance: Patient tolerated evaluation without incident      Patient's rehabilitation potential/prognosis is considered to be: Good, Excellent    Factors which may impact rehabilitation potential include:          GOALS   Patient Goal(s):    Goals Completed by 6 weeks Goal Status   Decrease pain across R knee with all prolonged activities, 0-2/10     Increase AROM R knee:  flex= 115*, ext= 0*, restoring NORMAL/independent gait mechanics R LE     Increase strength across R knee to grossy 4+, 5/5 for all ranges improving static/dynamic balance to GOOD/GOOD+     Improve endurance for all prolonged activities to GOOD/GOOD+     Assure I with [facility] for home

## 2024-07-16 ENCOUNTER — HOSPITAL ENCOUNTER (OUTPATIENT)
Dept: PHYSICAL THERAPY | Age: 74
Setting detail: THERAPIES SERIES
Discharge: HOME | End: 2024-07-16
Payer: MEDICARE

## 2024-07-16 PROCEDURE — 97110 THERAPEUTIC EXERCISES: CPT | Performed by: PHYSICAL THERAPIST

## 2024-07-16 PROCEDURE — 97530 THERAPEUTIC ACTIVITIES: CPT | Performed by: PHYSICAL THERAPIST

## 2024-07-16 NOTE — PROGRESS NOTES
[facility]                Phone: [phone]   Fax: [phone]    Physical Therapy Daily Treatment Note  Date:  2024    Patient Name:  Chance Helton    :  1950  MRN: [id number]    Evaluating therapist:  GRACIE White    (7/10/24)  Restrictions/Precautions:    Diagnosis:  s/p R TKA  (6/10/24)  Treatment Diagnosis:    Insurance/Certification information:  [facility] Medicare         cert dates:  7/10/24  to  10/10/24            ICD-10:  Z96.651  Referring Physician:  LIBERTAD Hardwick  Plan of care signed (Y/N):  Y  Visit# / total visits:    Pain level: 4/10   Time In:  1428  Time Out:  1510    Subjective:      Exercises:  Exercise/Equipment Resistance/Repetitions Other comments   StepOne   10 min            heel slides 20x3s    ankle pumps 2x30s    quad sets 20x3s    SAQ [id number]    SLR 2x10           sit/stand 3x10             toe raises 2x15    step ups  2x10x6\"            side  2x10x6\"                                                 Other Therapeutic Activities:      Home Exercise Program:  provided 7/10/24; 24    Manual Treatments:      Modalities:  IFC/ICE to R knee PRN     Timed Code Treatment Minutes:      Total Treatment Minutes:      Treatment/Activity Tolerance:  [] Patient tolerated treatment well [] Patient limited by fatique  [] Patient limited by pain  [] Patient limited by other medical complications  [] Other:     Prognosis: [] Good [] Fair  [] Poor    Patient Requires Follow-up: [] Yes  [] No    Plan:   [] Continue per plan of care [] Alter current plan (see comments)  [] Plan of care initiated [] Hold pending MD visit [] Discharge  Plan for Next Session:      See Weekly Progress Note: []  Yes  []  No  Next due:        Electronically signed by:  Endy Aleman, ESTHELA

## 2024-07-18 ENCOUNTER — HOSPITAL ENCOUNTER (OUTPATIENT)
Dept: PHYSICAL THERAPY | Age: 74
Setting detail: THERAPIES SERIES
Discharge: HOME | End: 2024-07-18
Payer: MEDICARE

## 2024-07-18 PROCEDURE — 97110 THERAPEUTIC EXERCISES: CPT | Performed by: PHYSICAL THERAPIST

## 2024-07-18 PROCEDURE — 97530 THERAPEUTIC ACTIVITIES: CPT | Performed by: PHYSICAL THERAPIST

## 2024-07-18 NOTE — PROGRESS NOTES
S:  pt presents to therapy for two of two scheduled visits this week; at week's end he reports that  his R knee is ; pain level given as 4/10 and is not as limiting to him; he is ambulating with st cane with no c/o buckling or LOB over last week's time; HEP going well per pt    O:  performed the exercises/treatments as written in the flowsheet for the week ending 7/19/24; initiated HEP for home management fo condition; R knee AROM:  flex= 102* , ext= 0* ; R knee strength grossly 4-/5 for all planes     A:  gabriela tx well; pt able to perform all requested tasks with good form and pacing noted; R knee AROM/strength shows improvement across all ranges; gait is stable with st cane and improved heel-toe progression/stance noted R LE; static/dynamic balance is GOOD; endurance for all prolonged activities is GOOD-/GOOD    P: cont with POC of stretching/strengthening for R knee with gait/balance/endurance activities as pt tolerates

## 2024-07-18 NOTE — PROGRESS NOTES
[facility]                Phone: [phone]   Fax: [phone]    Physical Therapy Daily Treatment Note  Date:  2024    Patient Name:  Chance Helton    :  1950  MRN: [id number]    Evaluating therapist:  GRACIE White    (7/10/24)  Restrictions/Precautions:    Diagnosis:  s/p R TKA  (6/10/24)  Treatment Diagnosis:    Insurance/Certification information:  [facility] Medicare         cert dates:  7/10/24  to  10/10/24            ICD-10:  Z96.651  Referring Physician:  LIBERTAD Hardwick  Plan of care signed (Y/N):  Y  Visit# / total visits:  3/12  Pain level: 4/10   Time In:  1424  Time Out:  1504    Subjective:      Exercises:  Exercise/Equipment Resistance/Repetitions Other comments   StepOne   10 min            heel slides 20x3s    ankle pumps 2x30s    quad sets 20x3s    SAQ [id number]    SLR 2x10           sit/stand 3x10             toe raises 2x15    step ups  2x10x6\"            side  2x10x6\"                   R knee:  flex=  102* 24                   ext= 0*                     Other Therapeutic Activities:      Home Exercise Program:  provided 7/10/24; 24    Manual Treatments:      Modalities:  IFC/ICE to R knee PRN     Timed Code Treatment Minutes:      Total Treatment Minutes:      Treatment/Activity Tolerance:  [] Patient tolerated treatment well [] Patient limited by fatique  [] Patient limited by pain  [] Patient limited by other medical complications  [] Other:     Prognosis: [] Good [] Fair  [] Poor    Patient Requires Follow-up: [] Yes  [] No    Plan:   [] Continue per plan of care [] Alter current plan (see comments)  [] Plan of care initiated [] Hold pending MD visit [] Discharge  Plan for Next Session:      See Weekly Progress Note: []  Yes  []  No  Next due:        Electronically signed by:  Endy Aleman, PT

## 2024-07-23 ENCOUNTER — HOSPITAL ENCOUNTER (OUTPATIENT)
Dept: PHYSICAL THERAPY | Age: 74
Setting detail: THERAPIES SERIES
Discharge: HOME | End: 2024-07-23
Payer: MEDICARE

## 2024-07-23 PROCEDURE — 97110 THERAPEUTIC EXERCISES: CPT | Performed by: PHYSICAL THERAPIST

## 2024-07-23 PROCEDURE — 97530 THERAPEUTIC ACTIVITIES: CPT | Performed by: PHYSICAL THERAPIST

## 2024-07-23 NOTE — PROGRESS NOTES
[facility]                Phone: [phone]   Fax: [phone]    Physical Therapy Daily Treatment Note  Date:  2024    Patient Name:  Chance Helton    :  1950  MRN: [id number]    Evaluating therapist:  GRACIE White    (7/10/24)  Restrictions/Precautions:    Diagnosis:  s/p R TKA  (6/10/24)  Treatment Diagnosis:    Insurance/Certification information:  [facility] Medicare         cert dates:  7/10/24  to  10/10/24            ICD-10:  Z96.651  Referring Physician:  LIBERTAD Hardwick  Plan of care signed (Y/N):  Y  Visit# / total visits:    Pain level: 4/10   Time In:  1420  Time Out:  1501    Subjective:      Exercises:  Exercise/Equipment Resistance/Repetitions Other comments   StepOne   10 min            heel slides 20x5s    ankle pumps 2x30s    quad sets 20x3s    SAQ [id number]    SLR [id number]            Total Gym squats 3x10   L10              toe raises 2x15    step ups  2x10x6\"            side  2x10x6\"                   R knee:  flex=  102* 24                   ext= 0*                     Other Therapeutic Activities:      Home Exercise Program:  provided 7/10/24; 24    Manual Treatments:      Modalities:  IFC/ICE to R knee PRN     Timed Code Treatment Minutes:      Total Treatment Minutes:      Treatment/Activity Tolerance:  [] Patient tolerated treatment well [] Patient limited by fatique  [] Patient limited by pain  [] Patient limited by other medical complications  [] Other:     Prognosis: [] Good [] Fair  [] Poor    Patient Requires Follow-up: [] Yes  [] No    Plan:   [] Continue per plan of care [] Alter current plan (see comments)  [] Plan of care initiated [] Hold pending MD visit [] Discharge  Plan for Next Session:      See Weekly Progress Note: []  Yes  []  No  Next due:        Electronically signed by:  Endy Aleman PT

## 2024-07-25 ENCOUNTER — HOSPITAL ENCOUNTER (OUTPATIENT)
Dept: PHYSICAL THERAPY | Age: 74
Setting detail: THERAPIES SERIES
Discharge: HOME | End: 2024-07-25
Payer: MEDICARE

## 2024-07-25 PROCEDURE — 97530 THERAPEUTIC ACTIVITIES: CPT | Performed by: PHYSICAL THERAPIST

## 2024-07-25 PROCEDURE — 97110 THERAPEUTIC EXERCISES: CPT | Performed by: PHYSICAL THERAPIST

## 2024-07-25 NOTE — PROGRESS NOTES
[facility]                Phone: [phone]   Fax: [phone]    Physical Therapy Daily Treatment Note  Date:  2024    Patient Name:  Chance Helton    :  1950  MRN: [id number]    Evaluating therapist:  GRACIE White    (7/10/24)  Restrictions/Precautions:    Diagnosis:  s/p R TKA  (6/10/24)  Treatment Diagnosis:    Insurance/Certification information:  [facility] Medicare         cert dates:  7/10/24  to  10/10/24            ICD-10:  Z96.651  Referring Physician:  LIBERTAD Hardwick  Plan of care signed (Y/N):  Y  Visit# / total visits:    Pain level: 4/10   Time In:  1427  Time Out:  1516    Subjective:      Exercises:  Exercise/Equipment Resistance/Repetitions Other comments   StepOne   10 min            heel slides 20x5s    ankle pumps 2x30s    quad sets 20x3s    SAQ [id number]    SLR [id number]            Total Gym squats 3x10   L10              toe raises 2x15    step ups  2x10x6\"            side  2x10x6\"                   R knee:  flex=  108* 24                   ext= 0*                     Other Therapeutic Activities:      Home Exercise Program:  provided 7/10/24; 24    Manual Treatments:      Modalities:  IFC/ICE to R knee PRN     Timed Code Treatment Minutes:      Total Treatment Minutes:      Treatment/Activity Tolerance:  [] Patient tolerated treatment well [] Patient limited by fatique  [] Patient limited by pain  [] Patient limited by other medical complications  [] Other:     Prognosis: [] Good [] Fair  [] Poor    Patient Requires Follow-up: [] Yes  [] No    Plan:   [] Continue per plan of care [] Alter current plan (see comments)  [] Plan of care initiated [] Hold pending MD visit [] Discharge  Plan for Next Session:      See Weekly Progress Note: []  Yes  []  No  Next due:        Electronically signed by:  Endy Aleman PT    no

## 2024-07-25 NOTE — PROGRESS NOTES
[facility]                Phone: [phone]   Fax: [phone]    Physical Therapy Daily Treatment Note  Date:  2024    Patient Name:  Chance Helton    :  1950  MRN: [id number]    Evaluating therapist:  GRACIE White    (7/10/24)  Restrictions/Precautions:    Diagnosis:  s/p R TKA  (6/10/24)  Treatment Diagnosis:    Insurance/Certification information:  [facility] Medicare         cert dates:  7/10/24  to  10/10/24            ICD-10:  Z96.651  Referring Physician:  LIBERTAD Hardwick  Plan of care signed (Y/N):  Y  Visit# / total visits:    Pain level: 4/10   Time In:  1427  Time Out:  1516    Subjective:      Exercises:  Exercise/Equipment Resistance/Repetitions Other comments   StepOne   10 min            heel slides 20x5s    ankle pumps 2x30s    quad sets 20x3s    SAQ [id number]    SLR [id number]            Total Gym squats 3x10   L10              toe raises 2x15    step ups  2x10x6\"            side  2x10x6\"                   R knee:  flex=  102* 24                   ext= 0*                     Other Therapeutic Activities:      Home Exercise Program:  provided 7/10/24; 24    Manual Treatments:      Modalities:  IFC/ICE to R knee PRN     Timed Code Treatment Minutes:      Total Treatment Minutes:      Treatment/Activity Tolerance:  [] Patient tolerated treatment well [] Patient limited by fatique  [] Patient limited by pain  [] Patient limited by other medical complications  [] Other:     Prognosis: [] Good [] Fair  [] Poor    Patient Requires Follow-up: [] Yes  [] No    Plan:   [] Continue per plan of care [] Alter current plan (see comments)  [] Plan of care initiated [] Hold pending MD visit [] Discharge  Plan for Next Session:      See Weekly Progress Note: []  Yes  []  No  Next due:        Electronically signed by:  Endy Aleman PT

## 2024-07-25 NOTE — PROGRESS NOTES
S:  pt presents to therapy for two of two scheduled visits this week; at week's end he reports that  his R knee is  feeling well; pain level given as 4/10 and is not as limiting to him; he is ambulating with st cane with no c/o buckling or LOB over last week's time; HEP going well per pt    O:  performed the exercises/treatments as written in the flowsheet for the week ending 7/26/24;  R knee AROM:  flex= 108* , ext= 0* ; R knee strength grossly 4/5 for all planes     A:  gabriela tx well; pt able to perform all requested tasks with good form and pacing noted; R knee AROM/strength again shows improvement across all ranges; gait is stable with st cane and improved heel-toe progression/stance noted R LE; static/dynamic balance is GOOD; endurance for all prolonged activities is GOOD-/GOOD    P: cont with POC of stretching/strengthening for R knee with gait/balance/endurance activities as pt tolerates

## 2024-07-26 ENCOUNTER — OFFICE VISIT (OUTPATIENT)
Dept: ORTHOPEDIC SURGERY | Age: 74
End: 2024-07-26
Payer: MEDICARE

## 2024-07-26 VITALS — HEIGHT: 64 IN | WEIGHT: 199 LBS | BODY MASS INDEX: 33.97 KG/M2

## 2024-07-26 DIAGNOSIS — Z96.651 S/P TOTAL KNEE ARTHROPLASTY, RIGHT: Primary | ICD-10-CM

## 2024-07-26 PROCEDURE — 99213 OFFICE O/P EST LOW 20 MIN: CPT | Performed by: ORTHOPAEDIC SURGERY

## 2024-07-26 PROCEDURE — 1123F ACP DISCUSS/DSCN MKR DOCD: CPT | Performed by: ORTHOPAEDIC SURGERY

## 2024-07-26 NOTE — PROGRESS NOTES
[facility]   ORTHOPAEDIC SURGERY AND SPORTS MEDICINE  DATE OF VISIT: 07/26/24  Follow Up Post Operative Visit     CHIEF COMPLAINT:   Chief Complaint   Patient presents with    Post-Op Check     Pt is here 6 weeks out from a ayana robot assisted right total knee arthroplasty.  Overall doing well.        Surgery: Ayana Robot Assisted Right total knee arthroplasty    Date: 06/10/24    Subjective:    Chance Helton is here for follow up visit s/p above procedure.  He is doing well.  He has been making good progress with PT    Controlled Substances Monitoring:        Physical Exam:    Height: 1.626 m (5' 4\"), Weight - Scale: 90.3 kg (199 lb)    General: Alert and oriented x3, no acute distress  Cardiovascular/pulmonary: No labored breathing, peripheral perfusion intact  Musculoskeletal:    Exam of the knee shows intact incision.  Range of motion 5 to 110 degrees.  The knee is stable.  There is no erythema.      Imaging: No new imaging.  Previous images reviewed    Assessment and Plan: Status post right total knee arthroplasty 6 weeks out  He is doing very well.  He will continue to progress with PT and transition to home exercises.  We will see him back in 6 weeks    Wili Hardwick MD  Orthopaedic Surgery   7/26/24  11:44 AM

## 2024-07-30 ENCOUNTER — HOSPITAL ENCOUNTER (OUTPATIENT)
Dept: PHYSICAL THERAPY | Age: 74
Setting detail: THERAPIES SERIES
Discharge: HOME | End: 2024-07-30
Payer: MEDICARE

## 2024-07-30 PROCEDURE — 97530 THERAPEUTIC ACTIVITIES: CPT | Performed by: PHYSICAL THERAPIST

## 2024-07-30 PROCEDURE — 97110 THERAPEUTIC EXERCISES: CPT | Performed by: PHYSICAL THERAPIST

## 2024-07-30 NOTE — PROGRESS NOTES
[facility]                Phone: [phone]   Fax: [phone]    Physical Therapy Daily Treatment Note  Date:  2024    Patient Name:  Chance Helton    :  1950  MRN: [id number]    Evaluating therapist:  GRACIE White    (7/10/24)  Restrictions/Precautions:    Diagnosis:  s/p R TKA  (6/10/24)  Treatment Diagnosis:    Insurance/Certification information:  [facility] Medicare         cert dates:  7/10/24  to  10/10/24            ICD-10:  Z96.651  Referring Physician:  LIBERTAD Hardwick  Plan of care signed (Y/N):  Y  Visit# / total visits:    Pain level: 4/10   Time In:  1421  Time Out:  1510    Subjective:      Exercises:  Exercise/Equipment Resistance/Repetitions Other comments   StepOne   10 min            heel slides 20x5s    ankle pumps 2x30s    quad sets 20x3s    SAQ [id number]    SLR [id number]            Total Gym squats 3x10   L10              toe raises 2x15    step ups  2x10x8\"            downs   2x10x6\"                   R knee:  flex=  108* 24                   ext= 0*                     Other Therapeutic Activities:      Home Exercise Program:  provided 7/10/24; 24    Manual Treatments:      Modalities:  IFC/ICE to R knee PRN     Timed Code Treatment Minutes:      Total Treatment Minutes:      Treatment/Activity Tolerance:  [] Patient tolerated treatment well [] Patient limited by fatique  [] Patient limited by pain  [] Patient limited by other medical complications  [] Other:     Prognosis: [] Good [] Fair  [] Poor    Patient Requires Follow-up: [] Yes  [] No    Plan:   [] Continue per plan of care [] Alter current plan (see comments)  [] Plan of care initiated [] Hold pending MD visit [] Discharge  Plan for Next Session:      See Weekly Progress Note: []  Yes  []  No  Next due:        Electronically signed by:  Endy Aleman PT

## 2024-08-01 ENCOUNTER — HOSPITAL ENCOUNTER (OUTPATIENT)
Dept: PHYSICAL THERAPY | Age: 74
Setting detail: THERAPIES SERIES
Discharge: HOME | End: 2024-08-01
Payer: MEDICARE

## 2024-08-01 PROCEDURE — 97110 THERAPEUTIC EXERCISES: CPT | Performed by: PHYSICAL THERAPIST

## 2024-08-01 PROCEDURE — 97530 THERAPEUTIC ACTIVITIES: CPT | Performed by: PHYSICAL THERAPIST

## 2024-08-01 NOTE — PROGRESS NOTES
S:  pt presents to therapy for two of two scheduled visits this week; at week's end he reports that  his R knee is  feeling well; pain level given as 4/10 and is not as limiting to him; he is ambulating with st cane with no c/o buckling or LOB over last week's time; HEP going well per pt    O:  performed the exercises/treatments as written in the flowsheet for the week ending 8/1/24;  R knee AROM:  flex= 110* , ext= 0* ; R knee strength grossly 4, 4+/5 for all planes     A:  gabriela tx well; pt able to perform all requested tasks with good form and pacing noted; R knee AROM/strength again shows small improvement across all ranges; gait is stable with no asst device and near NORMAL mechanics noted;  static/dynamic balance is GOOD/GOOD+; endurance for all prolonged activities is GOOD    P: cont with POC of stretching/strengthening for R knee with gait/balance/endurance activities as pt tolerates

## 2024-08-01 NOTE — PROGRESS NOTES
[facility]                Phone: [phone]   Fax: [phone]    Physical Therapy Daily Treatment Note  Date:  2024    Patient Name:  Chance Helton    :  1950  MRN: [id number]    Evaluating therapist:  GRACIE White    (7/10/24)  Restrictions/Precautions:    Diagnosis:  s/p R TKA  (6/10/24)  Treatment Diagnosis:    Insurance/Certification information:  [facility] Medicare         cert dates:  7/10/24  to  10/10/24            ICD-10:  Z96.651  Referring Physician:  LIBERTAD Hardwick  Plan of care signed (Y/N):  Y  Visit# / total visits:    Pain level: 4/10   Time In:  1425  Time Out:  1506    Subjective:      Exercises:  Exercise/Equipment Resistance/Repetitions Other comments   StepOne   10 min            heel slides 20x5s    ankle pumps 2x30s    quad sets 20x3s    SAQ [id number]    SLR [id number]            Total Gym squats 3x10   L10              toe raises 2x15    step ups  2x10x8\"            downs   2x10x6\"     reciprocal steps   5x4            R knee:  flex= 110* 24                   ext= 0*                     Other Therapeutic Activities:      Home Exercise Program:  provided 7/10/24; 24    Manual Treatments:      Modalities:  IFC/ICE to R knee PRN     Timed Code Treatment Minutes:      Total Treatment Minutes:      Treatment/Activity Tolerance:  [] Patient tolerated treatment well [] Patient limited by fatique  [] Patient limited by pain  [] Patient limited by other medical complications  [] Other:     Prognosis: [] Good [] Fair  [] Poor    Patient Requires Follow-up: [] Yes  [] No    Plan:   [] Continue per plan of care [] Alter current plan (see comments)  [] Plan of care initiated [] Hold pending MD visit [] Discharge  Plan for Next Session:      See Weekly Progress Note: []  Yes  []  No  Next due:        Electronically signed by:  Endy Aleman PT

## 2024-08-06 ENCOUNTER — HOSPITAL ENCOUNTER (OUTPATIENT)
Dept: PHYSICAL THERAPY | Age: 74
Setting detail: THERAPIES SERIES
Discharge: HOME | End: 2024-08-06
Payer: MEDICARE

## 2024-08-06 PROCEDURE — 97530 THERAPEUTIC ACTIVITIES: CPT | Performed by: PHYSICAL THERAPIST

## 2024-08-06 PROCEDURE — 97110 THERAPEUTIC EXERCISES: CPT | Performed by: PHYSICAL THERAPIST

## 2024-08-06 NOTE — PROGRESS NOTES
[facility]                Phone: [phone]   Fax: [phone]    Physical Therapy Daily Treatment Note  Date:  2024    Patient Name:  Chance Helton    :  1950  MRN: [id number]    Evaluating therapist:  GRACIE White    (7/10/24)  Restrictions/Precautions:    Diagnosis:  s/p R TKA  (6/10/24)  Treatment Diagnosis:    Insurance/Certification information:  [facility] Medicare         cert dates:  7/10/24  to  10/10/24            ICD-10:  Z96.651  Referring Physician:  LIBERTAD Hardwick  Plan of care signed (Y/N):  Y  Visit# / total visits:    Pain level: 4/10   Time In:  1425  Time Out:  1515    Subjective:      Exercises:  Exercise/Equipment Resistance/Repetitions Other comments   bike  10 min            heel slides 20x5s    ankle pumps 2x30s    quad sets 20x3s           Total Gym squats 3x10   L10          NK flex/ext 2x10x3.75lb         toe raises 2x15    step ups  2x10x8\"            downs   2x10x6\"     reciprocal steps   5x4            R knee:  flex= 110* 24                   ext= 0*                     Other Therapeutic Activities:      Home Exercise Program:  provided 7/10/24; 24    Manual Treatments:      Modalities:  IFC/ICE to R knee PRN     Timed Code Treatment Minutes:      Total Treatment Minutes:      Treatment/Activity Tolerance:  [] Patient tolerated treatment well [] Patient limited by fatique  [] Patient limited by pain  [] Patient limited by other medical complications  [] Other:     Prognosis: [] Good [] Fair  [] Poor    Patient Requires Follow-up: [] Yes  [] No    Plan:   [] Continue per plan of care [] Alter current plan (see comments)  [] Plan of care initiated [] Hold pending MD visit [] Discharge  Plan for Next Session:      See Weekly Progress Note: []  Yes  []  No  Next due:        Electronically signed by:  Endy Aleman PT

## 2024-08-06 NOTE — PROGRESS NOTES
S:  pt presents to therapy for only scheduled visit for the week; at this time he continues to report that  his R knee is feeling well; pain level given as 2-3/10 and is not as limiting to him; he is ambulating with no asst device full time and has had no issue with buckling or LOB over last week's time; HEP going well per pt    O:  performed the exercises/treatments as written in the flowsheet for the week ending 8/9/24;  R knee AROM:  flex= 110* , ext= 0* ; R knee strength grossly 4, 4+/5 for all planes     A:  gabriela tx well; pt able to perform all requested tasks with good form and pacing noted; R knee AROM/strength stable since last week's levels; gait is stable with no asst device and near NORMAL mechanics noted;  static/dynamic balance is GOOD/GOOD+; endurance for all prolonged activities is GOOD    P: cont with POC of stretching/strengthening for R knee with gait/balance/endurance activities as pt tolerates

## 2024-08-13 ENCOUNTER — HOSPITAL ENCOUNTER (OUTPATIENT)
Dept: PHYSICAL THERAPY | Age: 74
Setting detail: THERAPIES SERIES
Discharge: HOME | End: 2024-08-13
Payer: MEDICARE

## 2024-08-13 NOTE — PROGRESS NOTES
[facility]                Phone: [phone]  Fax: [phone]    Physical Therapy  Cancellation/No-show Note  Patient Name:  Chance Helton  :  1950   Date:  2024    For today's appointment patient:  []  Cancelled  []  Rescheduled appointment  [x]  No-show     Reason given by patient:  []  Patient ill  []  Conflicting appointment  []  No transportation    []  Conflict with work  [x]  No reason given  []  Other:     Comments:      Electronically signed by:  Endy Aleman, PT

## 2024-08-15 ENCOUNTER — HOSPITAL ENCOUNTER (OUTPATIENT)
Dept: PHYSICAL THERAPY | Age: 74
Setting detail: THERAPIES SERIES
Discharge: HOME | End: 2024-08-15
Payer: MEDICARE

## 2024-08-15 PROCEDURE — 97110 THERAPEUTIC EXERCISES: CPT | Performed by: PHYSICAL THERAPIST

## 2024-08-15 PROCEDURE — 97530 THERAPEUTIC ACTIVITIES: CPT | Performed by: PHYSICAL THERAPIST

## 2024-08-15 NOTE — PROGRESS NOTES
[facility]                Phone: [phone]   Fax: [phone]    Physical Therapy Daily Treatment Note  Date:  8/15/2024    Patient Name:  Chance Helton    :  1950  MRN: [id number]    Evaluating therapist:  GRACIE White    (7/10/24)  Restrictions/Precautions:    Diagnosis:  s/p R TKA  (6/10/24)  Treatment Diagnosis:    Insurance/Certification information:  [facility] Medicare         cert dates:  7/10/24  to  10/10/24            ICD-10:  Z96.651  Referring Physician:  LIBERTAD Hardwick  Plan of care signed (Y/N):  Y  Visit# / total visits:    Pain level: 4/10   Time In:  1424  Time Out:  1507    Subjective:      Exercises:  Exercise/Equipment Resistance/Repetitions Other comments   bike  10 min            heel slides 20x5s    ankle pumps 2x30s    quad sets 20x3s           Total Gym squats 3x10   L10          NK flex/ext 2x10x3.75lb         toe raises 2x15    step ups  2x10x8\"            downs   2x10x6\"     reciprocal steps   5x4            R knee:  flex= 114* 8/15/24                   ext= 0*                     Other Therapeutic Activities:      Home Exercise Program:  provided 7/10/24; 24    Manual Treatments:      Modalities:  IFC/ICE to R knee PRN     Timed Code Treatment Minutes:      Total Treatment Minutes:      Treatment/Activity Tolerance:  [] Patient tolerated treatment well [] Patient limited by fatique  [] Patient limited by pain  [] Patient limited by other medical complications  [] Other:     Prognosis: [] Good [] Fair  [] Poor    Patient Requires Follow-up: [] Yes  [] No    Plan:   [] Continue per plan of care [] Alter current plan (see comments)  [] Plan of care initiated [] Hold pending MD visit [] Discharge  Plan for Next Session:      See Weekly Progress Note: []  Yes  []  No  Next due:        Electronically signed by:  Endy Aleman PT

## 2024-08-15 NOTE — PROGRESS NOTES
S:  pt presents to therapy for only scheduled visit for the week, having been a no-show on 8/13/24; at this time he continues to report that  his R knee is feeling well; pain level given as 2-3/10 and is not as limiting to him; he is ambulating with no asst device full time and has had no issue with buckling or LOB over last week's time; HEP going well per pt    O:  performed the exercises/treatments as written in the flowsheet for the week ending 8/16/24;  R knee AROM:  flex= 114* , ext= 0* ; R knee strength grossly 4, 4+/5 for all planes     A:  gabriela tx well; pt able to perform all requested tasks with good form and pacing noted; R knee AROM/strength stable since last week's levels; gait is stable with no asst device and near NORMAL mechanics noted;  static/dynamic balance is GOOD/GOOD+; endurance for all prolonged activities is GOOD    P: cont with POC of stretching/strengthening for R knee with gait/balance/endurance activities as pt tolerates

## 2024-08-20 ENCOUNTER — HOSPITAL ENCOUNTER (OUTPATIENT)
Dept: PHYSICAL THERAPY | Age: 74
Setting detail: THERAPIES SERIES
Discharge: HOME | End: 2024-08-20
Payer: MEDICARE

## 2024-08-20 PROCEDURE — 97530 THERAPEUTIC ACTIVITIES: CPT | Performed by: PHYSICAL THERAPIST

## 2024-08-20 PROCEDURE — 97110 THERAPEUTIC EXERCISES: CPT | Performed by: PHYSICAL THERAPIST

## 2024-08-20 NOTE — PROGRESS NOTES
[facility]                Phone: [phone]   Fax: [phone]    Physical Therapy Daily Treatment Note  Date:  2024    Patient Name:  Chance Helton    :  1950  MRN: [id number]    Evaluating therapist:  GRACIE White    (7/10/24)  Restrictions/Precautions:    Diagnosis:  s/p R TKA  (6/10/24)  Treatment Diagnosis:    Insurance/Certification information:  [facility] Medicare         cert dates:  7/10/24  to  10/10/24            ICD-10:  Z96.651  Referring Physician:  LIBERTAD Hardwick  Plan of care signed (Y/N):  Y  Visit# / total visits:    Pain level: 4/10   Time In:  1427  Time Out:  1507    Subjective:      Exercises:  Exercise/Equipment Resistance/Repetitions Other comments   bike  10 min            heel slides 20x5s    ankle pumps 2x30s    quad sets 20x3s           Total Gym squats 3x10   L10          NK flex/ext 2x10x3.75lb         toe raises 2x15    step ups  2x10x8\"            downs   2x10x6\"     reciprocal steps   5x4            R knee:  flex= 114* 8/15/24                   ext= 0*                     Other Therapeutic Activities:      Home Exercise Program:  provided 7/10/24; 24    Manual Treatments:      Modalities:  IFC/ICE to R knee PRN     Timed Code Treatment Minutes:      Total Treatment Minutes:      Treatment/Activity Tolerance:  [] Patient tolerated treatment well [] Patient limited by fatique  [] Patient limited by pain  [] Patient limited by other medical complications  [] Other:     Prognosis: [] Good [] Fair  [] Poor    Patient Requires Follow-up: [] Yes  [] No    Plan:   [] Continue per plan of care [] Alter current plan (see comments)  [] Plan of care initiated [] Hold pending MD visit [] Discharge  Plan for Next Session:      See Weekly Progress Note: []  Yes  []  No  Next due:        Electronically signed by:  Endy Aleman PT

## 2024-08-22 ENCOUNTER — HOSPITAL ENCOUNTER (OUTPATIENT)
Dept: PHYSICAL THERAPY | Age: 74
Setting detail: THERAPIES SERIES
Discharge: HOME | End: 2024-08-22
Payer: MEDICARE

## 2024-08-22 PROCEDURE — 97530 THERAPEUTIC ACTIVITIES: CPT | Performed by: PHYSICAL THERAPIST

## 2024-08-22 PROCEDURE — 97110 THERAPEUTIC EXERCISES: CPT | Performed by: PHYSICAL THERAPIST

## 2024-08-22 NOTE — PROGRESS NOTES
[facility]                Phone: [phone]   Fax: [phone]    Physical Therapy Daily Treatment Note  Date:  2024    Patient Name:  Chance Helton    :  1950  MRN: [id number]    Evaluating therapist:  GRACIE White    (7/10/24)  Restrictions/Precautions:    Diagnosis:  s/p R TKA  (6/10/24)  Treatment Diagnosis:    Insurance/Certification information:  [facility] Medicare         cert dates:  7/10/24  to  10/10/24            ICD-10:  Z96.651  Referring Physician:  LIBERTAD Hardwick  Plan of care signed (Y/N):  Y  Visit# / total visits:  10/12  Pain level: 4/10   Time In:  1426  Time Out:  1504    Subjective:      Exercises:  Exercise/Equipment Resistance/Repetitions Other comments   bike  10 min            heel slides 20x5s    ankle pumps 2x30s    quad sets 20x3s           Total Gym squats 3x10   L10          NK flex/ext 2x10x3.75lb         toe raises 2x15    step ups  2x10x8\"            downs   2x10x6\"     reciprocal steps   5x4            R knee:  flex= 114* 24                   ext= 0*                     Other Therapeutic Activities:      Home Exercise Program:  provided 7/10/24; 24    Manual Treatments:      Modalities:  IFC/ICE to R knee PRN     Timed Code Treatment Minutes:      Total Treatment Minutes:      Treatment/Activity Tolerance:  [] Patient tolerated treatment well [] Patient limited by fatique  [] Patient limited by pain  [] Patient limited by other medical complications  [] Other:     Prognosis: [] Good [] Fair  [] Poor    Patient Requires Follow-up: [] Yes  [] No    Plan:   [] Continue per plan of care [] Alter current plan (see comments)  [] Plan of care initiated [] Hold pending MD visit [] Discharge  Plan for Next Session:      See Weekly Progress Note: []  Yes  []  No  Next due:        Electronically signed by:  Endy Aleman PT

## 2024-08-22 NOTE — PROGRESS NOTES
S:  pt presents to therapy for two of two scheduled visits for the week; at this time he continues to report  that  his R knee is feeling well; pain level given as 2-3/10 and is not as limiting to him; he is ambulating with no asst device full time and has had no issue with buckling or LOB over last week's time; HEP going well per pt    O:  performed the exercises/treatments as written in the flowsheet for the week ending 8/23/24;  R knee AROM:  flex= 114* , ext= 0* ; R knee strength grossly 4, 4+/5 for all planes     A:  gabriela tx well; pt able to perform all requested tasks with good form and pacing noted; R knee AROM/strength again stable since last week's levels; gait is stable with no asst device and near NORMAL mechanics noted;  static/dynamic balance is GOOD/GOOD+; endurance for all prolonged activities is GOOD    P: cont with POC of stretching/strengthening for R knee with gait/balance/endurance activities as pt tolerates

## 2024-08-26 DIAGNOSIS — R73.03 PREDIABETES: ICD-10-CM

## 2024-08-26 DIAGNOSIS — I10 ESSENTIAL HYPERTENSION: ICD-10-CM

## 2024-08-26 DIAGNOSIS — N42.9 PROSTATE DISORDER: ICD-10-CM

## 2024-08-26 DIAGNOSIS — E53.8 VITAMIN B12 DEFICIENCY: ICD-10-CM

## 2024-08-26 DIAGNOSIS — E87.5 HYPERKALEMIA: Primary | ICD-10-CM

## 2024-08-26 DIAGNOSIS — E78.2 MIXED HYPERLIPIDEMIA: ICD-10-CM

## 2024-08-26 DIAGNOSIS — E55.9 VITAMIN D DEFICIENCY: ICD-10-CM

## 2024-08-26 DIAGNOSIS — M1A.9XX0 CHRONIC GOUT WITHOUT TOPHUS, UNSPECIFIED CAUSE, UNSPECIFIED SITE: ICD-10-CM

## 2024-08-26 LAB
ALBUMIN: 4.5 G/DL (ref 3.5–5.2)
ALP BLD-CCNC: 71 U/L (ref 40–129)
ALT SERPL-CCNC: 24 U/L (ref 0–40)
ANION GAP SERPL CALCULATED.3IONS-SCNC: 13 MMOL/L (ref 7–16)
AST SERPL-CCNC: 22 U/L (ref 0–39)
BASOPHILS ABSOLUTE: 0.05 K/UL (ref 0–0.2)
BASOPHILS RELATIVE PERCENT: 1 % (ref 0–2)
BILIRUB SERPL-MCNC: 0.4 MG/DL (ref 0–1.2)
BILIRUBIN, URINE: NEGATIVE
BUN BLDV-MCNC: 16 MG/DL (ref 6–23)
CALCIUM SERPL-MCNC: 10.1 MG/DL (ref 8.6–10.2)
CHLORIDE BLD-SCNC: 104 MMOL/L (ref 98–107)
CHOLESTEROL, TOTAL: 143 MG/DL
CO2: 25 MMOL/L (ref 22–29)
COLOR, UA: YELLOW
CREAT SERPL-MCNC: 1 MG/DL (ref 0.7–1.2)
CREATININE URINE: 112.1 MG/DL (ref 40–278)
EOSINOPHILS ABSOLUTE: 0.33 K/UL (ref 0.05–0.5)
EOSINOPHILS RELATIVE PERCENT: 6 % (ref 0–6)
FOLATE: 8.7 NG/ML (ref 4.8–24.2)
GFR, ESTIMATED: 80 ML/MIN/1.73M2
GLUCOSE BLD-MCNC: 112 MG/DL (ref 74–99)
GLUCOSE URINE: NEGATIVE MG/DL
HBA1C MFR BLD: 5.6 % (ref 4–5.6)
HCT VFR BLD CALC: 49.5 % (ref 37–54)
HDLC SERPL-MCNC: 36 MG/DL
HEMOGLOBIN: 16 G/DL (ref 12.5–16.5)
IMMATURE GRANULOCYTES %: 0 % (ref 0–5)
IMMATURE GRANULOCYTES ABSOLUTE: <0.03 K/UL (ref 0–0.58)
KETONES, URINE: NEGATIVE MG/DL
LDL CHOLESTEROL: 66 MG/DL
LEUKOCYTE ESTERASE, URINE: NEGATIVE
LYMPHOCYTES ABSOLUTE: 1.92 K/UL (ref 1.5–4)
LYMPHOCYTES RELATIVE PERCENT: 33 % (ref 20–42)
MCH RBC QN AUTO: 29.3 PG (ref 26–35)
MCHC RBC AUTO-ENTMCNC: 32.3 G/DL (ref 32–34.5)
MCV RBC AUTO: 90.7 FL (ref 80–99.9)
MICROALBUMIN/CREAT 24H UR: <12 MG/L (ref 0–19)
MICROALBUMIN/CREAT UR-RTO: NORMAL MCG/MG CREAT (ref 0–30)
MONOCYTES ABSOLUTE: 0.5 K/UL (ref 0.1–0.95)
MONOCYTES RELATIVE PERCENT: 9 % (ref 2–12)
NEUTROPHILS ABSOLUTE: 3.05 K/UL (ref 1.8–7.3)
NEUTROPHILS RELATIVE PERCENT: 52 % (ref 43–80)
NITRITE, URINE: NEGATIVE
PDW BLD-RTO: 13.6 % (ref 11.5–15)
PH, URINE: 6 (ref 5–9)
PLATELET # BLD: 142 K/UL (ref 130–450)
PMV BLD AUTO: 9.7 FL (ref 7–12)
POTASSIUM SERPL-SCNC: 5.3 MMOL/L (ref 3.5–5)
PROSTATE SPECIFIC ANTIGEN: 2.7 NG/ML (ref 0–4)
PROTEIN UA: NEGATIVE MG/DL
RBC # BLD: 5.46 M/UL (ref 3.8–5.8)
RBC UA: ABNORMAL /HPF
SODIUM BLD-SCNC: 142 MMOL/L (ref 132–146)
SPECIFIC GRAVITY UA: 1.02 (ref 1–1.03)
TOTAL CK: 48 U/L (ref 20–200)
TOTAL PROTEIN: 7.7 G/DL (ref 6.4–8.3)
TRIGL SERPL-MCNC: 204 MG/DL
TSH SERPL DL<=0.05 MIU/L-ACNC: 2.06 UIU/ML (ref 0.27–4.2)
TURBIDITY: CLEAR
URIC ACID: 5.3 MG/DL (ref 3.4–7)
URINE HGB: ABNORMAL
UROBILINOGEN, URINE: 0.2 EU/DL (ref 0–1)
VITAMIN B-12: 406 PG/ML (ref 211–946)
VITAMIN D 25-HYDROXY: 22.1 NG/ML (ref 30–100)
VLDLC SERPL CALC-MCNC: 41 MG/DL
WBC # BLD: 5.9 K/UL (ref 4.5–11.5)
WBC UA: ABNORMAL /HPF

## 2024-08-26 NOTE — RESULT ENCOUNTER NOTE
Potassium mildly elevated 5.3, often a false positive but we generally recommend repeating to be safe.  Encourage proper hydration avoid potassium supplements or excess intake.  Keep follow-up to review more detail sooner as needed

## 2024-08-27 ENCOUNTER — HOSPITAL ENCOUNTER (OUTPATIENT)
Dept: PHYSICAL THERAPY | Age: 74
Setting detail: THERAPIES SERIES
Discharge: HOME | End: 2024-08-27
Payer: MEDICARE

## 2024-08-27 PROCEDURE — 97110 THERAPEUTIC EXERCISES: CPT | Performed by: PHYSICAL THERAPIST

## 2024-08-27 PROCEDURE — 97530 THERAPEUTIC ACTIVITIES: CPT | Performed by: PHYSICAL THERAPIST

## 2024-08-27 NOTE — PROGRESS NOTES
[facility]                Phone: [phone]   Fax: [phone]    Physical Therapy Daily Treatment Note  Date:  2024    Patient Name:  Chance Helton    :  1950  MRN: [id number]    Evaluating therapist:  GRACIE White    (7/10/24)  Restrictions/Precautions:    Diagnosis:  s/p R TKA  (6/10/24)  Treatment Diagnosis:    Insurance/Certification information:  [facility] Medicare         cert dates:  7/10/24  to  10/10/24            ICD-10:  Z96.651  Referring Physician:  LIBERTAD Hardwick  Plan of care signed (Y/N):  Y  Visit# / total visits:    Pain level: 4/10   Time In:  1426  Time Out:  1506    Subjective:      Exercises:  Exercise/Equipment Resistance/Repetitions Other comments   bike  10 min            heel slides 20x5s    ankle pumps 2x30s    quad sets 20x3s           Total Gym squats 3x10   L10          NK flex/ext [id number]         toe raises 2x15    step ups  2x10x8\"            downs   2x10x6\"     reciprocal steps   5x4            R knee:  flex= 114* 24                   ext= 0*                     Other Therapeutic Activities:      Home Exercise Program:  provided 7/10/24; 24    Manual Treatments:      Modalities:  IFC/ICE to R knee PRN     Timed Code Treatment Minutes:      Total Treatment Minutes:      Treatment/Activity Tolerance:  [] Patient tolerated treatment well [] Patient limited by fatique  [] Patient limited by pain  [] Patient limited by other medical complications  [] Other:     Prognosis: [] Good [] Fair  [] Poor    Patient Requires Follow-up: [] Yes  [] No    Plan:   [] Continue per plan of care [] Alter current plan (see comments)  [] Plan of care initiated [] Hold pending MD visit [] Discharge  Plan for Next Session:      See Weekly Progress Note: []  Yes  []  No  Next due:        Electronically signed by:  Endy Aleman PT

## 2024-08-28 ENCOUNTER — OFFICE VISIT (OUTPATIENT)
Dept: PRIMARY CARE CLINIC | Age: 74
End: 2024-08-28

## 2024-08-28 VITALS
DIASTOLIC BLOOD PRESSURE: 64 MMHG | OXYGEN SATURATION: 95 % | SYSTOLIC BLOOD PRESSURE: 130 MMHG | BODY MASS INDEX: 32.61 KG/M2 | HEART RATE: 67 BPM | TEMPERATURE: 98 F | WEIGHT: 190 LBS

## 2024-08-28 DIAGNOSIS — E53.8 VITAMIN B12 DEFICIENCY: ICD-10-CM

## 2024-08-28 DIAGNOSIS — E55.9 VITAMIN D DEFICIENCY: ICD-10-CM

## 2024-08-28 DIAGNOSIS — F32.A ANXIETY AND DEPRESSION: ICD-10-CM

## 2024-08-28 DIAGNOSIS — E78.2 MIXED HYPERLIPIDEMIA: ICD-10-CM
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
## 2024-08-28 DIAGNOSIS — Z23 ENCOUNTER FOR IMMUNIZATION: ICD-10-CM

## 2024-08-28 DIAGNOSIS — I10 ESSENTIAL HYPERTENSION: ICD-10-CM

## 2024-08-28 DIAGNOSIS — E87.5 HYPERKALEMIA: ICD-10-CM

## 2024-08-28 DIAGNOSIS — M17.0 PRIMARY OSTEOARTHRITIS OF BOTH KNEES: ICD-10-CM

## 2024-08-28 DIAGNOSIS — Z72.0 TOBACCO ABUSE: ICD-10-CM

## 2024-08-28 DIAGNOSIS — Z72.0 TOBACCO USE: ICD-10-CM

## 2024-08-28 DIAGNOSIS — G47.00 INSOMNIA, UNSPECIFIED TYPE: ICD-10-CM

## 2024-08-28 DIAGNOSIS — F41.9 ANXIETY AND DEPRESSION: ICD-10-CM

## 2024-08-28 DIAGNOSIS — M1A.9XX0 CHRONIC GOUT WITHOUT TOPHUS, UNSPECIFIED CAUSE, UNSPECIFIED SITE: ICD-10-CM

## 2024-08-28 DIAGNOSIS — N42.9 PROSTATE DISORDER: ICD-10-CM

## 2024-08-28 DIAGNOSIS — I25.10 CORONARY ARTERY DISEASE INVOLVING NATIVE CORONARY ARTERY OF NATIVE HEART WITHOUT ANGINA PECTORIS: ICD-10-CM

## 2024-08-28 DIAGNOSIS — J44.9 CHRONIC OBSTRUCTIVE PULMONARY DISEASE, UNSPECIFIED COPD TYPE (HCC): ICD-10-CM

## 2024-08-28 DIAGNOSIS — S61.259A BAT BITE OF FINGER, INITIAL ENCOUNTER: Primary | ICD-10-CM

## 2024-08-28 DIAGNOSIS — R73.03 PREDIABETES: ICD-10-CM

## 2024-08-28 RX ORDER — ZOLPIDEM TARTRATE 5 MG/1
5 TABLET ORAL NIGHTLY PRN
Qty: 10 TABLET | Refills: 0 | Status: SHIPPED | OUTPATIENT
Start: 2024-08-28 | End: 2024-09-07

## 2024-08-28 RX ORDER — ALLOPURINOL 300 MG/1
300 TABLET ORAL DAILY
Qty: 90 TABLET | Refills: 3 | Status: SHIPPED | OUTPATIENT
Start: 2024-08-28

## 2024-08-28 RX ORDER — METOPROLOL SUCCINATE 25 MG/1
25 TABLET, EXTENDED RELEASE ORAL NIGHTLY
Qty: 90 TABLET | Refills: 3 | Status: SHIPPED | OUTPATIENT
Start: 2024-08-28

## 2024-08-28 RX ORDER — ROSUVASTATIN CALCIUM 10 MG/1
10 TABLET, COATED ORAL DAILY
COMMUNITY
Start: 2024-07-25 | End: 2024-08-28

## 2024-08-28 RX ORDER — TIOTROPIUM BROMIDE 18 UG/1
18 CAPSULE ORAL; RESPIRATORY (INHALATION) DAILY
Qty: 30 CAPSULE | Refills: 12 | Status: SHIPPED | OUTPATIENT
Start: 2024-08-28

## 2024-08-28 RX ORDER — MUPIROCIN 20 MG/G
OINTMENT TOPICAL 3 TIMES DAILY
Qty: 22 G | Refills: 0 | Status: SHIPPED | OUTPATIENT
Start: 2024-08-28 | End: 2024-09-07

## 2024-08-28 RX ORDER — ALBUTEROL SULFATE 90 UG/1
AEROSOL, METERED RESPIRATORY (INHALATION)
Qty: 1 EACH | Refills: 3 | Status: SHIPPED | OUTPATIENT
Start: 2024-08-28

## 2024-08-28 NOTE — PROGRESS NOTES
Chance Helton : 1950 Sex: male  Age: 74 y.o.    Chief Complaint   Patient presents with    3 Month Follow-Up    Discuss Labs    Animal Bite     Bit by a bat yesterday on right index finger   Had bat tested but wont know results until Friday     Immunizations         HPI:      Presents today with his wife for follow-up.  Surgery went well, still some discomfort but progressing.  Incision looks good.  No significant swelling, leg looks back to \"he has old leg\".    Every so often gets a bat in his house, tends to hit it with a pillow put in a pillowcase and release.  This time when he went to put it in the pillowcase he either grabbed a fang or it bit him, he expressed blood cleaned thoroughly.  Took the bat to the health department.  This occurred yesterday and results are supposed to back 3 days from then.  Recommendations for going to the ER for PEP, he is deferring for results    Having trouble with insomnia, ever since anesthesia.  Took Ambien in the past.  Would like a short-term prescription.  Failing melatonin    Labs reviewed, potassium 5.3, declines repeat before 3 months.  CMP otherwise normal outside glucose 112 but hemoglobin A1c 5.6 HDL 36 LDL 66 triglyceride 204 vitamin D down to 22, he stopped supplement prior to surgery TSH 2.06 B12 406 CBC with differential normal folic acid 8.7 PSA down to 2.7 urinalysis chronic RBC, he has seen urologist negative workup prefers other E/T asymptomatic microalbumin creatinine ratio-NC  Most Recent Labs  CBC  Lab Results   Component Value Date/Time    WBC 5.9 2024 09:34 AM    WBC 13.1 2024 04:23 AM    WBC 5.5 2024 11:45 AM    RBC 5.46 2024 09:34 AM    RBC 4.45 2024 04:23 AM    RBC 5.35 2024 11:45 AM    HGB 16.0 2024 09:34 AM    HGB 13.7 2024 04:23 AM    HGB 16.2 2024 11:45 AM    HCT 49.5 2024 09:34 AM    HCT 40.7 2024 04:23 AM    HCT 49.3 2024 11:45 AM    MCV 90.7 2024 09:34 AM  preparing to see the patient, obtaining history, performing exam, counseling/educating the patient/family/caregiver, ordering medications and/or tests, referring/care coordination if applicable and documenting clinical information in the electronic health record.     Signs and symptoms to watch for discussed, serious signs and symptoms reviewed.  ER if any.             Han Elias MD    Patients are advised to check with insurance company to ensure coverage and to fully understand benefits and cost prior to any testing.  This note was created with the assistance of voice recognition software.  Document was reviewed however may contain grammatical errors. This note or partial portions of this note may have been created using a copy forward or copy paste feature but these portions have been verified and re-edited for accuracy and any portions not in need of editing or reviews are not being used to generate any component necessary for billing purposes.  Some portions may be carried over for continuity purposes and to aid me with monitoring of past medical conditions and discussions.  Elements necessary for proper CPT code selection are based only on elements of the visit that are truly unique to this visit.

## 2024-08-29 ENCOUNTER — HOSPITAL ENCOUNTER (OUTPATIENT)
Dept: PHYSICAL THERAPY | Age: 74
Setting detail: THERAPIES SERIES
Discharge: HOME | End: 2024-08-29
Payer: MEDICARE

## 2024-08-29 PROCEDURE — 97110 THERAPEUTIC EXERCISES: CPT | Performed by: PHYSICAL THERAPIST

## 2024-08-29 PROCEDURE — 97530 THERAPEUTIC ACTIVITIES: CPT | Performed by: PHYSICAL THERAPIST

## 2024-08-29 NOTE — PROGRESS NOTES
[facility]                Phone: [phone]   Fax: [phone]    Physical Therapy Daily Treatment Note  Date:  2024    Patient Name:  Chance Helton    :  1950  MRN: [id number]    Evaluating therapist:  GRACIE White    (7/10/24)  Restrictions/Precautions:    Diagnosis:  s/p R TKA  (6/10/24)  Treatment Diagnosis:    Insurance/Certification information:  [facility]         cert dates:  7/10/24  to  10/10/24            ICD-10:  Z96.651  Referring Physician:  LIBERTAD Hardwick  Plan of care signed (Y/N):  Y  Visit# / total visits:    Pain level: 4/10   Time In:  1425  Time Out:  1507    Subjective:      Exercises:  Exercise/Equipment Resistance/Repetitions Other comments   bike  10 min            heel slides 20x5s    ankle pumps 2x30s    quad sets 20x3s           [employer] squats 3x10   L10          NK flex/ext [id number]         toe raises 2x15    step ups  2x10x8\"            downs   2x10x6\"     reciprocal steps   5x4            R knee:  flex= 114* 24                   ext= 0*                     Other Therapeutic Activities:      Home Exercise Program:  provided 7/10/24; 24    Manual Treatments:      Modalities:  IFC/ICE to R knee PRN     Timed Code Treatment Minutes:      Total Treatment Minutes:      Treatment/Activity Tolerance:  [] Patient tolerated treatment well [] Patient limited by fatique  [] Patient limited by pain  [] Patient limited by other medical complications  [] Other:     Prognosis: [] Good [] Fair  [] Poor    Patient Requires Follow-up: [] Yes  [] No    Plan:   [] Continue per plan of care [] Alter current plan (see comments)  [] Plan of care initiated [] Hold pending MD visit [] Discharge  Plan for Next Session:      See Weekly Progress Note: []  Yes  []  No  Next due:        Electronically signed by:  Endy Aleman PT

## 2024-09-06 ENCOUNTER — OFFICE VISIT (OUTPATIENT)
Dept: ORTHOPEDIC SURGERY | Age: 74
End: 2024-09-06

## 2024-09-06 VITALS — WEIGHT: 190 LBS | BODY MASS INDEX: 32.44 KG/M2 | HEIGHT: 64 IN

## 2024-09-06 DIAGNOSIS — Z96.651 S/P TOTAL KNEE ARTHROPLASTY, RIGHT: Primary | ICD-10-CM

## 2024-09-06 DIAGNOSIS — M17.11 PRIMARY OSTEOARTHRITIS OF RIGHT KNEE: ICD-10-CM

## 2024-09-06 PROCEDURE — 99024 POSTOP FOLLOW-UP VISIT: CPT | Performed by: NURSE PRACTITIONER

## 2024-09-06 NOTE — PROGRESS NOTES
Plan:  Status post right Dejon robotic assisted total knee arthroplasty     Patient is 3 months post op from above procedure. Patient doing very well. He has completed PT and has great ROM in knee. Imaging reviewed with patient today. Continue WBAT RLE and all activities as tolerated. Patient will follow up in 3 months for reevaluation and imaging.       ELIANA Ritchie-CNP  Orthopedic Surgery   09/06/24  10:40 AM

## 2024-10-17 DIAGNOSIS — E78.2 MIXED HYPERLIPIDEMIA: ICD-10-CM

## 2024-10-17 RX ORDER — ROSUVASTATIN CALCIUM 20 MG/1
20 TABLET, COATED ORAL DAILY
Qty: 90 TABLET | Refills: 3 | Status: SHIPPED | OUTPATIENT
Start: 2024-10-17

## 2024-11-21 DIAGNOSIS — E78.2 MIXED HYPERLIPIDEMIA: ICD-10-CM

## 2024-11-21 DIAGNOSIS — I10 ESSENTIAL HYPERTENSION: ICD-10-CM

## 2024-11-21 DIAGNOSIS — M1A.9XX0 CHRONIC GOUT WITHOUT TOPHUS, UNSPECIFIED CAUSE, UNSPECIFIED SITE: ICD-10-CM

## 2024-11-21 DIAGNOSIS — R73.03 PREDIABETES: ICD-10-CM

## 2024-11-21 DIAGNOSIS — E55.9 VITAMIN D DEFICIENCY: ICD-10-CM

## 2024-11-21 DIAGNOSIS — I25.10 CORONARY ARTERY DISEASE INVOLVING NATIVE CORONARY ARTERY OF NATIVE HEART WITHOUT ANGINA PECTORIS: ICD-10-CM

## 2024-11-21 DIAGNOSIS — E53.8 VITAMIN B12 DEFICIENCY: ICD-10-CM

## 2024-11-21 LAB
ALBUMIN: 4.3 G/DL (ref 3.5–5.2)
ALP BLD-CCNC: 79 U/L (ref 40–129)
ALT SERPL-CCNC: 19 U/L (ref 0–40)
ANION GAP SERPL CALCULATED.3IONS-SCNC: 14 MMOL/L (ref 7–16)
AST SERPL-CCNC: 24 U/L (ref 0–39)
BASOPHILS ABSOLUTE: 0.05 K/UL (ref 0–0.2)
BASOPHILS RELATIVE PERCENT: 1 % (ref 0–2)
BILIRUB SERPL-MCNC: 0.7 MG/DL (ref 0–1.2)
BUN BLDV-MCNC: 17 MG/DL (ref 6–23)
CALCIUM SERPL-MCNC: 9.7 MG/DL (ref 8.6–10.2)
CHLORIDE BLD-SCNC: 103 MMOL/L (ref 98–107)
CHOLESTEROL, TOTAL: 131 MG/DL
CO2: 24 MMOL/L (ref 22–29)
CREAT SERPL-MCNC: 1.1 MG/DL (ref 0.7–1.2)
EOSINOPHILS ABSOLUTE: 0.26 K/UL (ref 0.05–0.5)
EOSINOPHILS RELATIVE PERCENT: 4 % (ref 0–6)
FOLATE: 9.7 NG/ML (ref 4.8–24.2)
GFR, ESTIMATED: 71 ML/MIN/1.73M2
GLUCOSE BLD-MCNC: 114 MG/DL (ref 74–99)
HBA1C MFR BLD: 6.1 % (ref 4–5.6)
HCT VFR BLD CALC: 47.6 % (ref 37–54)
HDLC SERPL-MCNC: 37 MG/DL
HEMOGLOBIN: 15.9 G/DL (ref 12.5–16.5)
IMMATURE GRANULOCYTES %: 1 % (ref 0–5)
IMMATURE GRANULOCYTES ABSOLUTE: 0.03 K/UL (ref 0–0.58)
LDL CHOLESTEROL: 64 MG/DL
LYMPHOCYTES ABSOLUTE: 1.89 K/UL (ref 1.5–4)
LYMPHOCYTES RELATIVE PERCENT: 29 % (ref 20–42)
MCH RBC QN AUTO: 29.9 PG (ref 26–35)
MCHC RBC AUTO-ENTMCNC: 33.4 G/DL (ref 32–34.5)
MCV RBC AUTO: 89.5 FL (ref 80–99.9)
MONOCYTES ABSOLUTE: 0.62 K/UL (ref 0.1–0.95)
MONOCYTES RELATIVE PERCENT: 10 % (ref 2–12)
NEUTROPHILS ABSOLUTE: 3.62 K/UL (ref 1.8–7.3)
NEUTROPHILS RELATIVE PERCENT: 56 % (ref 43–80)
PDW BLD-RTO: 13.9 % (ref 11.5–15)
PLATELET # BLD: 142 K/UL (ref 130–450)
PMV BLD AUTO: 9.4 FL (ref 7–12)
POTASSIUM SERPL-SCNC: 4.6 MMOL/L (ref 3.5–5)
RBC # BLD: 5.32 M/UL (ref 3.8–5.8)
SODIUM BLD-SCNC: 141 MMOL/L (ref 132–146)
TOTAL CK: 177 U/L (ref 20–200)
TOTAL PROTEIN: 7.3 G/DL (ref 6.4–8.3)
TRIGL SERPL-MCNC: 149 MG/DL
TSH SERPL DL<=0.05 MIU/L-ACNC: 1.89 UIU/ML (ref 0.27–4.2)
URIC ACID: 5.3 MG/DL (ref 3.4–7)
VITAMIN B-12: 437 PG/ML (ref 211–946)
VITAMIN D 25-HYDROXY: 19.3 NG/ML (ref 30–100)
VLDLC SERPL CALC-MCNC: 30 MG/DL
WBC # BLD: 6.5 K/UL (ref 4.5–11.5)

## 2024-12-04 ENCOUNTER — OFFICE VISIT (OUTPATIENT)
Dept: PRIMARY CARE CLINIC | Age: 74
End: 2024-12-04

## 2024-12-04 VITALS
DIASTOLIC BLOOD PRESSURE: 62 MMHG | OXYGEN SATURATION: 96 % | BODY MASS INDEX: 33.12 KG/M2 | WEIGHT: 194 LBS | SYSTOLIC BLOOD PRESSURE: 118 MMHG | HEIGHT: 64 IN | RESPIRATION RATE: 20 BRPM | HEART RATE: 63 BPM | TEMPERATURE: 97.3 F

## 2024-12-04 DIAGNOSIS — E53.8 VITAMIN B12 DEFICIENCY: ICD-10-CM

## 2024-12-04 DIAGNOSIS — N42.9 PROSTATE DISORDER: ICD-10-CM

## 2024-12-04 DIAGNOSIS — F32.A ANXIETY AND DEPRESSION: ICD-10-CM

## 2024-12-04 DIAGNOSIS — I25.10 CORONARY ARTERY DISEASE INVOLVING NATIVE CORONARY ARTERY OF NATIVE HEART WITHOUT ANGINA PECTORIS: ICD-10-CM

## 2024-12-04 DIAGNOSIS — E66.01 CLASS 2 SEVERE OBESITY DUE TO EXCESS CALORIES WITH SERIOUS COMORBIDITY AND BODY MASS INDEX (BMI) OF 35.0 TO 35.9 IN ADULT: ICD-10-CM

## 2024-12-04 DIAGNOSIS — E55.9 VITAMIN D DEFICIENCY: ICD-10-CM

## 2024-12-04 DIAGNOSIS — E79.0 HYPERURICEMIA: ICD-10-CM

## 2024-12-04 DIAGNOSIS — J44.9 CHRONIC OBSTRUCTIVE PULMONARY DISEASE, UNSPECIFIED COPD TYPE (HCC): ICD-10-CM

## 2024-12-04 DIAGNOSIS — E78.2 MIXED HYPERLIPIDEMIA: Primary | ICD-10-CM

## 2024-12-04 DIAGNOSIS — E66.812 CLASS 2 SEVERE OBESITY DUE TO EXCESS CALORIES WITH SERIOUS COMORBIDITY AND BODY MASS INDEX (BMI) OF 35.0 TO 35.9 IN ADULT: ICD-10-CM

## 2024-12-04 DIAGNOSIS — I10 ESSENTIAL HYPERTENSION: ICD-10-CM

## 2024-12-04 DIAGNOSIS — R73.03 PREDIABETES: ICD-10-CM

## 2024-12-04 DIAGNOSIS — Z72.0 TOBACCO USE: ICD-10-CM

## 2024-12-04 DIAGNOSIS — F41.9 ANXIETY AND DEPRESSION: ICD-10-CM

## 2024-12-04 DIAGNOSIS — M1A.9XX0 CHRONIC GOUT WITHOUT TOPHUS, UNSPECIFIED CAUSE, UNSPECIFIED SITE: ICD-10-CM

## 2024-12-04 NOTE — PROGRESS NOTES
allopurinol 300 mg daily.  Continue to emphasized low uric acid diet.    Proper hydration reviewed.  Has Colcrys to use prn,2×1 at onset of  symptoms and repeat one hour later..  reviewed.  Uric acid goals reviewed.   Was at goal with allopurinol, 5.8-6.0-up to 9.1 after stopping allopurinol.  He resumed 300 mg daily tolerating and uric acid, 5.7-6.5-7.5-7.5 (was forgetting allopurinol)-5.8.-5.3-5.3    Chronic obstructive pulmonary disease, unspecified  Care Plan:  Comments : Asymptomatic.  Of the CT 7/21 - for nodules.  Defers pulmonary Function Tests, referral or otherwise.  Currently on  Spiriva and p.r.n. Proair which he rarely needs although a little more in the fall.  - Last CT 3/24    Ntervertebral disc disorders with myelopathy, lumbar region  Care Plan:   Status post injections through all points, then surgery with  discectomy/laminectomy through Dr. Pena. Continue per them. h/o PT     Vitamin D deficiency, unspecified  Care Plan:  Comments : Recommend he restart vitamin D3 2000 IUs daily.      Disorder of prostate, unspecified  Care Plan:  Comments : Counseled extensively. Differential reviewed, including serious etiologies.rising  PSA is trending down from 2.6-2.2-3.53-1.81-3.88-3.09-2.70.  Continue per Dr. Shelley      Secondary polycythemia  Care Plan:  Comments : Likely related to smoking, other differential reviewed. Monitor.  Deferred other  eval/tx. fluctuates.  Now normal    CRI-  Counseled. Proper hydration. Avoid nephrotoxic agents monitor. Declines imaging or referral creatinine stable, currently normal         Tobacco abuse-  Counseled extensively on importance of abstinence.  declines assistance,  LDCT 3/24 negative for nodules    Bilateral knee OA    Has seen [facility]  Topicals reviewed.  R/B Tylenol reviewed.  Avoidance of NSAID recommendations reviewed.  X-rays done, now Dr. Hardwick, s/p right knee arthroplasty June 2024    Obesity  Counseled.  Risk reviewed.  Lifestyle

## 2025-02-27 DIAGNOSIS — E78.2 MIXED HYPERLIPIDEMIA: ICD-10-CM

## 2025-02-27 DIAGNOSIS — E55.9 VITAMIN D DEFICIENCY: ICD-10-CM

## 2025-02-27 DIAGNOSIS — I10 ESSENTIAL HYPERTENSION: Primary | ICD-10-CM

## 2025-02-27 DIAGNOSIS — R73.03 PREDIABETES: ICD-10-CM

## 2025-02-27 DIAGNOSIS — E87.5 HYPERKALEMIA: ICD-10-CM

## 2025-02-27 DIAGNOSIS — N28.9 RENAL INSUFFICIENCY: ICD-10-CM

## 2025-02-27 DIAGNOSIS — N42.9 PROSTATE DISORDER: ICD-10-CM

## 2025-02-27 DIAGNOSIS — E79.0 HYPERURICEMIA: ICD-10-CM

## 2025-02-27 DIAGNOSIS — E53.8 VITAMIN B12 DEFICIENCY: ICD-10-CM

## 2025-02-27 LAB
ALBUMIN: 4.6 G/DL (ref 3.5–5.2)
ALP BLD-CCNC: 64 U/L (ref 40–129)
ALT SERPL-CCNC: 26 U/L (ref 0–40)
ANION GAP SERPL CALCULATED.3IONS-SCNC: 14 MMOL/L (ref 7–16)
AST SERPL-CCNC: 28 U/L (ref 0–39)
BASOPHILS ABSOLUTE: 0.04 K/UL (ref 0–0.2)
BASOPHILS RELATIVE PERCENT: 1 % (ref 0–2)
BILIRUB SERPL-MCNC: 0.6 MG/DL (ref 0–1.2)
BILIRUBIN, URINE: NEGATIVE
BUN BLDV-MCNC: 19 MG/DL (ref 6–23)
CALCIUM SERPL-MCNC: 9.6 MG/DL (ref 8.6–10.2)
CHLORIDE BLD-SCNC: 103 MMOL/L (ref 98–107)
CHOLESTEROL, TOTAL: 125 MG/DL
CO2: 23 MMOL/L (ref 22–29)
COLOR, UA: YELLOW
CREAT SERPL-MCNC: 1.4 MG/DL (ref 0.7–1.2)
CREATININE URINE: 169.7 MG/DL (ref 40–278)
EOSINOPHILS ABSOLUTE: 0.33 K/UL (ref 0.05–0.5)
EOSINOPHILS RELATIVE PERCENT: 7 % (ref 0–6)
FOLATE: 9.8 NG/ML (ref 4.8–24.2)
GFR, ESTIMATED: 52 ML/MIN/1.73M2
GLUCOSE BLD-MCNC: 125 MG/DL (ref 74–99)
GLUCOSE URINE: NEGATIVE MG/DL
HBA1C MFR BLD: 5.7 % (ref 4–5.6)
HCT VFR BLD CALC: 48.9 % (ref 37–54)
HDLC SERPL-MCNC: 35 MG/DL
HEMOGLOBIN: 16.5 G/DL (ref 12.5–16.5)
IMMATURE GRANULOCYTES %: 0 % (ref 0–5)
IMMATURE GRANULOCYTES ABSOLUTE: <0.03 K/UL (ref 0–0.58)
KETONES, URINE: NEGATIVE MG/DL
LDL CHOLESTEROL: 57 MG/DL
LEUKOCYTE ESTERASE, URINE: NEGATIVE
LYMPHOCYTES ABSOLUTE: 1.5 K/UL (ref 1.5–4)
LYMPHOCYTES RELATIVE PERCENT: 31 % (ref 20–42)
MCH RBC QN AUTO: 30.6 PG (ref 26–35)
MCHC RBC AUTO-ENTMCNC: 33.7 G/DL (ref 32–34.5)
MCV RBC AUTO: 90.6 FL (ref 80–99.9)
MICROALBUMIN/CREAT 24H UR: 18 MG/L (ref 0–19)
MICROALBUMIN/CREAT UR-RTO: 10 MCG/MG CREAT (ref 0–30)
MONOCYTES ABSOLUTE: 0.49 K/UL (ref 0.1–0.95)
MONOCYTES RELATIVE PERCENT: 10 % (ref 2–12)
NEUTROPHILS ABSOLUTE: 2.5 K/UL (ref 1.8–7.3)
NEUTROPHILS RELATIVE PERCENT: 51 % (ref 43–80)
NITRITE, URINE: NEGATIVE
PDW BLD-RTO: 13.4 % (ref 11.5–15)
PH, URINE: 5.5 (ref 5–8)
PLATELET # BLD: 114 K/UL (ref 130–450)
PMV BLD AUTO: 9.3 FL (ref 7–12)
POTASSIUM SERPL-SCNC: 5.2 MMOL/L (ref 3.5–5)
PROSTATE SPECIFIC ANTIGEN: 2.81 NG/ML (ref 0–4)
PROTEIN UA: NEGATIVE MG/DL
RBC # BLD: 5.4 M/UL (ref 3.8–5.8)
RBC UA: ABNORMAL /HPF
SODIUM BLD-SCNC: 140 MMOL/L (ref 132–146)
SPECIFIC GRAVITY UA: 1.02 (ref 1–1.03)
TOTAL CK: 86 U/L (ref 20–200)
TOTAL PROTEIN: 7.5 G/DL (ref 6.4–8.3)
TRIGL SERPL-MCNC: 163 MG/DL
TSH SERPL DL<=0.05 MIU/L-ACNC: 1.56 UIU/ML (ref 0.27–4.2)
TURBIDITY: CLEAR
URIC ACID: 4.5 MG/DL (ref 3.4–7)
URINE HGB: ABNORMAL
UROBILINOGEN, URINE: 0.2 EU/DL (ref 0–1)
VITAMIN B-12: 450 PG/ML (ref 211–946)
VITAMIN D 25-HYDROXY: 17.6 NG/ML (ref 30–100)
VLDLC SERPL CALC-MCNC: 33 MG/DL
WBC # BLD: 4.9 K/UL (ref 4.5–11.5)
WBC UA: ABNORMAL /HPF

## 2025-02-28 NOTE — RESULT ENCOUNTER NOTE
Potassium elevated as well as creatinine.  Make sure staying properly hydrated, avoid any potassium supplements or excess intake.  Also avoid NSAIDs.  Recheck nonfasting properly hydrated.  Keep follow-up to review in more detail sooner as needed

## 2025-03-06 ENCOUNTER — OFFICE VISIT (OUTPATIENT)
Dept: PRIMARY CARE CLINIC | Age: 75
End: 2025-03-06

## 2025-03-06 VITALS
TEMPERATURE: 97.6 F | BODY MASS INDEX: 34.15 KG/M2 | OXYGEN SATURATION: 95 % | WEIGHT: 200 LBS | HEIGHT: 64 IN | HEART RATE: 60 BPM | DIASTOLIC BLOOD PRESSURE: 78 MMHG | SYSTOLIC BLOOD PRESSURE: 136 MMHG

## 2025-03-06 VITALS
SYSTOLIC BLOOD PRESSURE: 136 MMHG | HEIGHT: 64 IN | DIASTOLIC BLOOD PRESSURE: 78 MMHG | BODY MASS INDEX: 34.15 KG/M2 | WEIGHT: 200 LBS

## 2025-03-06 DIAGNOSIS — F32.A ANXIETY AND DEPRESSION: ICD-10-CM

## 2025-03-06 DIAGNOSIS — R73.03 PREDIABETES: ICD-10-CM

## 2025-03-06 DIAGNOSIS — N42.9 PROSTATE DISORDER: ICD-10-CM

## 2025-03-06 DIAGNOSIS — Z00.00 MEDICARE ANNUAL WELLNESS VISIT, SUBSEQUENT: Primary | ICD-10-CM

## 2025-03-06 DIAGNOSIS — E78.2 MIXED HYPERLIPIDEMIA: ICD-10-CM

## 2025-03-06 DIAGNOSIS — K21.9 GASTROESOPHAGEAL REFLUX DISEASE WITHOUT ESOPHAGITIS: ICD-10-CM

## 2025-03-06 DIAGNOSIS — E53.8 VITAMIN B12 DEFICIENCY: ICD-10-CM

## 2025-03-06 DIAGNOSIS — E55.9 VITAMIN D DEFICIENCY: ICD-10-CM

## 2025-03-06 DIAGNOSIS — J44.9 CHRONIC OBSTRUCTIVE PULMONARY DISEASE, UNSPECIFIED COPD TYPE (HCC): Primary | ICD-10-CM

## 2025-03-06 DIAGNOSIS — I25.10 CORONARY ARTERY DISEASE INVOLVING NATIVE CORONARY ARTERY OF NATIVE HEART WITHOUT ANGINA PECTORIS: ICD-10-CM

## 2025-03-06 DIAGNOSIS — I10 ESSENTIAL HYPERTENSION: ICD-10-CM

## 2025-03-06 DIAGNOSIS — F41.9 ANXIETY AND DEPRESSION: ICD-10-CM

## 2025-03-06 DIAGNOSIS — E79.0 HYPERURICEMIA: ICD-10-CM

## 2025-03-06 DIAGNOSIS — R10.13 DYSPEPSIA: ICD-10-CM

## 2025-03-06 DIAGNOSIS — Z72.0 TOBACCO USE: ICD-10-CM

## 2025-03-06 PROBLEM — E66.09 CLASS 1 OBESITY DUE TO EXCESS CALORIES WITH SERIOUS COMORBIDITY AND BODY MASS INDEX (BMI) OF 34.0 TO 34.9 IN ADULT: Status: ACTIVE | Noted: 2021-07-09

## 2025-03-06 PROBLEM — E66.811 CLASS 1 OBESITY DUE TO EXCESS CALORIES WITH SERIOUS COMORBIDITY AND BODY MASS INDEX (BMI) OF 34.0 TO 34.9 IN ADULT: Status: ACTIVE | Noted: 2021-07-09

## 2025-03-06 RX ORDER — OMEPRAZOLE 20 MG/1
20 CAPSULE, DELAYED RELEASE ORAL
Qty: 30 CAPSULE | Refills: 3 | Status: SHIPPED | OUTPATIENT
Start: 2025-03-06

## 2025-03-06 RX ORDER — TIOTROPIUM BROMIDE 18 UG/1
18 CAPSULE ORAL; RESPIRATORY (INHALATION) DAILY
Qty: 30 CAPSULE | Refills: 12 | Status: SHIPPED | OUTPATIENT
Start: 2025-03-06

## 2025-03-06 RX ORDER — ALBUTEROL SULFATE 90 UG/1
INHALANT RESPIRATORY (INHALATION)
Qty: 1 EACH | Refills: 3 | Status: SHIPPED | OUTPATIENT
Start: 2025-03-06

## 2025-03-06 SDOH — ECONOMIC STABILITY: FOOD INSECURITY: WITHIN THE PAST 12 MONTHS, THE FOOD YOU BOUGHT JUST DIDN'T LAST AND YOU DIDN'T HAVE MONEY TO GET MORE.: NEVER TRUE

## 2025-03-06 SDOH — ECONOMIC STABILITY: FOOD INSECURITY: WITHIN THE PAST 12 MONTHS, YOU WORRIED THAT YOUR FOOD WOULD RUN OUT BEFORE YOU GOT MONEY TO BUY MORE.: NEVER TRUE

## 2025-03-06 ASSESSMENT — PATIENT HEALTH QUESTIONNAIRE - PHQ9
SUM OF ALL RESPONSES TO PHQ QUESTIONS 1-9: 2
9. THOUGHTS THAT YOU WOULD BE BETTER OFF DEAD, OR OF HURTING YOURSELF: NOT AT ALL
3. TROUBLE FALLING OR STAYING ASLEEP: NOT AT ALL
SUM OF ALL RESPONSES TO PHQ QUESTIONS 1-9: 2
2. FEELING DOWN, DEPRESSED OR HOPELESS: SEVERAL DAYS
6. FEELING BAD ABOUT YOURSELF - OR THAT YOU ARE A FAILURE OR HAVE LET YOURSELF OR YOUR FAMILY DOWN: NOT AT ALL
SUM OF ALL RESPONSES TO PHQ QUESTIONS 1-9: 2
5. POOR APPETITE OR OVEREATING: NOT AT ALL
SUM OF ALL RESPONSES TO PHQ QUESTIONS 1-9: 2
1. LITTLE INTEREST OR PLEASURE IN DOING THINGS: SEVERAL DAYS
7. TROUBLE CONCENTRATING ON THINGS, SUCH AS READING THE NEWSPAPER OR WATCHING TELEVISION: NOT AT ALL
10. IF YOU CHECKED OFF ANY PROBLEMS, HOW DIFFICULT HAVE THESE PROBLEMS MADE IT FOR YOU TO DO YOUR WORK, TAKE CARE OF THINGS AT HOME, OR GET ALONG WITH OTHER PEOPLE: NOT DIFFICULT AT ALL
4. FEELING TIRED OR HAVING LITTLE ENERGY: NOT AT ALL
8. MOVING OR SPEAKING SO SLOWLY THAT OTHER PEOPLE COULD HAVE NOTICED. OR THE OPPOSITE, BEING SO FIGETY OR RESTLESS THAT YOU HAVE BEEN MOVING AROUND A LOT MORE THAN USUAL: NOT AT ALL

## 2025-03-06 ASSESSMENT — LIFESTYLE VARIABLES
HOW MANY STANDARD DRINKS CONTAINING ALCOHOL DO YOU HAVE ON A TYPICAL DAY: 1 OR 2
HOW OFTEN DO YOU HAVE A DRINK CONTAINING ALCOHOL: MONTHLY OR LESS

## 2025-03-06 NOTE — PROGRESS NOTES
accuracy and any portions not in need of editing or reviews are not being used to generate any component necessary for billing purposes.  Some portions may be carried over for continuity purposes and to aid me with monitoring of past medical conditions and discussions.  Elements necessary for proper CPT code selection are based only on elements of the visit that are truly unique to this visit.

## 2025-03-06 NOTE — PROGRESS NOTES
Medicare Annual Wellness Visit    Chance Helton is here for Medicare AWV    Assessment & Plan   Assessment and Plan:   Diagnosis Orders   1. Medicare annual wellness visit, subsequent               No problem-specific Assessment & Plan notes found for this encounter.       Plan as above.  Counseled extensively and differential diagnoses relevant to above were reviewed, including serious etiologies, risks and complications, especially if left uncontrolled.  If relevant, instructions and  alternatives to meds/treatment reviewed, as well as interactions, and  SE's/ADRs reviewed, notify immediately if any, discontinuing new meds if any.  Plan made after discussion and shared decision making.        Health maintenance issues discussed at length as above, 3/6/2025 .  Encouraged yearly physicals.          As long as symptoms steadily improve/resolve, and medical conditions follow the expected course, FU as below, as previously directed and sooner PRN.    Return for Medicare Annual Wellness Visit in 1 year.                Educational materials and/or home exercises printed for patient's review and were included in patient instructions on his/her After Visit Summary and given to patient at the end of visit.       After discussion, patient and/or guardian verbalizes understanding, agrees, feels comfortable with and wishes to proceed with above treatment plan. Call for any pending results, FU sooner if abnormal, as needed or if any current symptoms persist/worsen.      Advised patient to call with any new medication issues, and read all Rx info from pharmacy to assure aware of all possible risks and side effects of medication before taking.     Reviewed age and gender appropriate health screening exams and vaccinations.  Advised patient regarding importance of keeping up with recommended health maintenance and to schedule as soon as possible if overdue, as this is important in assessing for undiagnosed pathology, especially

## 2025-03-06 NOTE — PATIENT INSTRUCTIONS
fruit and vegetables every day. Dark green, deep orange, red, or yellow fruits and vegetables are especially good for you. Examples include spinach, carrots, peaches, and berries.     Foods high in fiber can reduce your cholesterol and provide important vitamins and minerals. High-fiber foods include whole-grain cereals and breads, oatmeal, beans, brown rice, citrus fruits, and apples.     Eat lean proteins. Heart-healthy proteins include seafood, lean meats and poultry, eggs, beans, peas, nuts, seeds, and soy products.     Limit drinks and foods with added sugar. These include candy, desserts, and soda pop.   Heart-healthy lifestyle    If your doctor recommends it, get more exercise. For many people, walking is a good choice. Or you may want to swim, bike, or do other activities. Bit by bit, increase the time you're active every day. Try for at least 30 minutes on most days of the week.     Try to quit or cut back on using tobacco and other nicotine products. This includes smoking and vaping. If you need help quitting, talk to your doctor about stop-smoking programs and medicines. These can increase your chances of quitting for good. Quitting is one of the most important things you can do to protect your heart. It is never too late to quit. Try to avoid secondhand smoke too.     Stay at a weight that's healthy for you. Talk to your doctor if you need help losing weight.     Try to get 7 to 9 hours of sleep each night.     Limit alcohol to 2 drinks a day for men and 1 drink a day for women. Too much alcohol can cause health problems.     Manage other health problems such as diabetes, high blood pressure, and high cholesterol. If you think you may have a problem with alcohol or drug use, talk to your doctor.   Medicines    Take your medicines exactly as prescribed. Call your doctor if you think you are having a problem with your medicine.     If your doctor recommends aspirin, take the amount directed each day. Make

## 2025-07-25 DIAGNOSIS — K21.9 GASTROESOPHAGEAL REFLUX DISEASE WITHOUT ESOPHAGITIS: ICD-10-CM

## 2025-07-25 DIAGNOSIS — E53.8 VITAMIN B12 DEFICIENCY: ICD-10-CM

## 2025-07-25 DIAGNOSIS — E55.9 VITAMIN D DEFICIENCY: ICD-10-CM

## 2025-07-25 DIAGNOSIS — I10 ESSENTIAL HYPERTENSION: ICD-10-CM

## 2025-07-25 DIAGNOSIS — E78.2 MIXED HYPERLIPIDEMIA: ICD-10-CM

## 2025-07-25 DIAGNOSIS — R73.03 PREDIABETES: ICD-10-CM

## 2025-07-25 DIAGNOSIS — E79.0 HYPERURICEMIA: ICD-10-CM

## 2025-07-25 LAB
ALBUMIN: 4.1 G/DL (ref 3.5–5.2)
ALP BLD-CCNC: 56 U/L (ref 40–129)
ALT SERPL-CCNC: 31 U/L (ref 0–50)
ANION GAP SERPL CALCULATED.3IONS-SCNC: 14 MMOL/L (ref 7–16)
AST SERPL-CCNC: 37 U/L (ref 0–50)
BASOPHILS ABSOLUTE: 0.05 K/UL (ref 0–0.2)
BASOPHILS RELATIVE PERCENT: 1 % (ref 0–2)
BILIRUB SERPL-MCNC: 0.6 MG/DL (ref 0–1.2)
BILIRUBIN, URINE: NEGATIVE
BUN BLDV-MCNC: 13 MG/DL (ref 8–23)
CALCIUM SERPL-MCNC: 9.3 MG/DL (ref 8.8–10.2)
CHLORIDE BLD-SCNC: 102 MMOL/L (ref 98–107)
CHOLESTEROL, TOTAL: 114 MG/DL
CO2: 24 MMOL/L (ref 22–29)
COLOR, UA: YELLOW
CREAT SERPL-MCNC: 1.1 MG/DL (ref 0.7–1.2)
CREATININE URINE: 89.7 MG/DL (ref 40–278)
EOSINOPHILS ABSOLUTE: 0.31 K/UL (ref 0.05–0.5)
EOSINOPHILS RELATIVE PERCENT: 5 % (ref 0–6)
FOLATE: 10 NG/ML (ref 4.6–34.8)
GFR, ESTIMATED: 71 ML/MIN/1.73M2
GLUCOSE BLD-MCNC: 120 MG/DL (ref 74–99)
GLUCOSE URINE: NEGATIVE MG/DL
HBA1C MFR BLD: 5.8 % (ref 4–5.6)
HCT VFR BLD CALC: 45.1 % (ref 37–54)
HDLC SERPL-MCNC: 43 MG/DL
HEMOGLOBIN: 15.3 G/DL (ref 12.5–16.5)
IMMATURE GRANULOCYTES %: 1 % (ref 0–5)
IMMATURE GRANULOCYTES ABSOLUTE: 0.03 K/UL (ref 0–0.58)
KETONES, URINE: NEGATIVE MG/DL
LDL CHOLESTEROL: 39 MG/DL
LEUKOCYTE ESTERASE, URINE: NEGATIVE
LYMPHOCYTES ABSOLUTE: 1.99 K/UL (ref 1.5–4)
LYMPHOCYTES RELATIVE PERCENT: 31 % (ref 20–42)
MCH RBC QN AUTO: 31 PG (ref 26–35)
MCHC RBC AUTO-ENTMCNC: 33.9 G/DL (ref 32–34.5)
MCV RBC AUTO: 91.3 FL (ref 80–99.9)
MICROALBUMIN/CREAT 24H UR: <12 MG/L (ref 0–20)
MICROALBUMIN/CREAT UR-RTO: <13 MCG/MG CREAT (ref 0–30)
MONOCYTES ABSOLUTE: 0.44 K/UL (ref 0.1–0.95)
MONOCYTES RELATIVE PERCENT: 7 % (ref 2–12)
NEUTROPHILS ABSOLUTE: 3.51 K/UL (ref 1.8–7.3)
NEUTROPHILS RELATIVE PERCENT: 55 % (ref 43–80)
NITRITE, URINE: NEGATIVE
PDW BLD-RTO: 13.7 % (ref 11.5–15)
PH, URINE: 5.5 (ref 5–8)
PLATELET # BLD: 128 K/UL (ref 130–450)
PMV BLD AUTO: 9.4 FL (ref 7–12)
POTASSIUM SERPL-SCNC: 4.5 MMOL/L (ref 3.5–5.1)
PROTEIN UA: NEGATIVE MG/DL
RBC # BLD: 4.94 M/UL (ref 3.8–5.8)
RBC UA: NORMAL /HPF
SODIUM BLD-SCNC: 140 MMOL/L (ref 136–145)
SPECIFIC GRAVITY UA: 1.01 (ref 1–1.03)
TOTAL CK: 67 U/L (ref 0–190)
TOTAL PROTEIN: 7 G/DL (ref 6.4–8.3)
TRIGL SERPL-MCNC: 163 MG/DL
TSH SERPL DL<=0.05 MIU/L-ACNC: 2.34 UIU/ML (ref 0.27–4.2)
TURBIDITY: CLEAR
URIC ACID: 4.7 MG/DL (ref 3.4–7)
URINE HGB: ABNORMAL
UROBILINOGEN, URINE: 0.2 EU/DL (ref 0–1)
VITAMIN B-12: 366 PG/ML (ref 232–1245)
VITAMIN D 25-HYDROXY: 26.6 NG/ML (ref 30–100)
VLDLC SERPL CALC-MCNC: 33 MG/DL
WBC # BLD: 6.3 K/UL (ref 4.5–11.5)
WBC UA: NORMAL /HPF

## 2025-07-29 ENCOUNTER — OFFICE VISIT (OUTPATIENT)
Dept: PRIMARY CARE CLINIC | Age: 75
End: 2025-07-29

## 2025-07-29 VITALS
SYSTOLIC BLOOD PRESSURE: 114 MMHG | WEIGHT: 197 LBS | HEIGHT: 64 IN | DIASTOLIC BLOOD PRESSURE: 70 MMHG | BODY MASS INDEX: 33.63 KG/M2 | OXYGEN SATURATION: 96 % | HEART RATE: 67 BPM | TEMPERATURE: 98 F

## 2025-07-29 DIAGNOSIS — F41.9 ANXIETY AND DEPRESSION: ICD-10-CM

## 2025-07-29 DIAGNOSIS — J44.9 CHRONIC OBSTRUCTIVE PULMONARY DISEASE, UNSPECIFIED COPD TYPE (HCC): ICD-10-CM

## 2025-07-29 DIAGNOSIS — E55.9 VITAMIN D DEFICIENCY: ICD-10-CM

## 2025-07-29 DIAGNOSIS — F32.A ANXIETY AND DEPRESSION: ICD-10-CM

## 2025-07-29 DIAGNOSIS — I10 ESSENTIAL HYPERTENSION: Primary | ICD-10-CM

## 2025-07-29 DIAGNOSIS — R73.03 PREDIABETES: ICD-10-CM

## 2025-07-29 DIAGNOSIS — D69.6 THROMBOCYTOPENIA, UNSPECIFIED: ICD-10-CM

## 2025-07-29 DIAGNOSIS — E79.0 HYPERURICEMIA: ICD-10-CM

## 2025-07-29 DIAGNOSIS — E53.8 VITAMIN B12 DEFICIENCY: ICD-10-CM

## 2025-07-29 DIAGNOSIS — K21.9 GASTROESOPHAGEAL REFLUX DISEASE WITHOUT ESOPHAGITIS: ICD-10-CM

## 2025-07-29 DIAGNOSIS — E78.2 MIXED HYPERLIPIDEMIA: ICD-10-CM

## 2025-07-29 RX ORDER — OMEPRAZOLE 20 MG/1
20 CAPSULE, DELAYED RELEASE ORAL
Qty: 30 CAPSULE | Refills: 12 | Status: SHIPPED | OUTPATIENT
Start: 2025-07-29

## 2025-07-29 NOTE — PROGRESS NOTES
Chance Helton : 1950 Sex: male  Age: 75 y.o.    Chief Complaint   Patient presents with    Hyperlipidemia    COPD         HPI:      Presents today with his wife for follow-up.  Recent URI but \"200% better\" continues to get better so simply wants to have it monitored, may do plain Mucinex and Flonase.  Let us know if it continues or worsens    Counseled on vaccines including COVID Shingrix RSV as well as colon cancer screening recommendations    Considering Shingrix RSV and COVID booster      Glucose 120s triglyceride 163 HDL 43 LDL 39 A1c 5.8 TSH 2.34 vitamin D 26 B12 366 recommend he resume his vitamin B12 and start vitamin D3 2000 IUs daily CBC shows platelets come up to 128 otherwise normal urinalysis trace hemoglobin but 0-2 RBC, simply wants monitor does not wish to follow-up with urology anytime soon, counseled    Most Recent Labs  CBC  Lab Results   Component Value Date/Time    WBC 6.3 2025 10:30 AM    WBC 4.9 2025 09:01 AM    WBC 6.5 2024 08:34 AM    RBC 4.94 2025 10:30 AM    RBC 5.40 2025 09:01 AM    RBC 5.32 2024 08:34 AM    HGB 15.3 2025 10:30 AM    HGB 16.5 2025 09:01 AM    HGB 15.9 2024 08:34 AM    HCT 45.1 2025 10:30 AM    HCT 48.9 2025 09:01 AM    HCT 47.6 2024 08:34 AM    MCV 91.3 2025 10:30 AM    MCV 90.6 2025 09:01 AM    MCV 89.5 2024 08:34 AM     2025 10:30 AM     2025 09:01 AM     2024 08:34 AM      CMP  Lab Results   Component Value Date/Time     2025 10:30 AM     2025 09:01 AM     2024 08:34 AM    K 4.5 2025 10:30 AM    K 5.2 2025 09:01 AM    K 4.6 2024 08:34 AM     2025 10:30 AM     2025 09:01 AM     2024 08:34 AM    CO2 24 2025 10:30 AM    CO2 23 2025 09:01 AM    CO2 24 2024 08:34 AM    ANIONGAP 14 2025 10:30 AM    ANIONGAP 14 2025

## (undated) DEVICE — KIT INT FIX FEM TIB CKPT MAKOPLASTY

## (undated) DEVICE — DRAPE,TOP,102X53,STERILE: Brand: MEDLINE

## (undated) DEVICE — PACK PROCEDURE SURG GEN CUST

## (undated) DEVICE — DRESSING HYDROFIBER AQUACEL AG ADVANTAGE 3.5X10 IN

## (undated) DEVICE — GOWN,SIRUS,POLYRNF,BRTHSLV,XLN/XL,20/CS: Brand: MEDLINE

## (undated) DEVICE — DRESSING FOAM ADH POLYUR W/ SIL WND SHT 4IN LEN 4IN W

## (undated) DEVICE — 3M™ IOBAN™ 2 ANTIMICROBIAL INCISE DRAPE 6651EZ: Brand: IOBAN™ 2

## (undated) DEVICE — PIN BNE FIX TEMP L110MM DIA4MM MAKO

## (undated) DEVICE — TUBING, SUCTION, 9/32" X 10', STRAIGHT: Brand: MEDLINE

## (undated) DEVICE — 4-PORT MANIFOLD: Brand: NEPTUNE 2

## (undated) DEVICE — SUTURE STRATAFIX SYMMETRIC SZ 1 L18IN ABSRB VLT CT1 L36CM SXPP1A404

## (undated) DEVICE — BANDAGE COMPR W6INXL12FT SMOOTH FOR LIMB EXSANG ESMARCH

## (undated) DEVICE — 3M™ COBAN™ NL STERILE NON-LATEX SELF-ADHERENT WRAP, 2084S, 4 IN X 5 YD (10 CM X 4,5 M), 18 ROLLS/CASE: Brand: 3M™ COBAN™

## (undated) DEVICE — SYRINGE MED 30ML STD CLR PLAS LUERLOCK TIP N CTRL DISP

## (undated) DEVICE — SOLUTION IRRIG 3000ML 0.9% SOD CHL USP UROMATIC PLAS CONT

## (undated) DEVICE — DRESSING HYDROFIBER AQUACEL AG ADVANTAGE 3.5X6 IN

## (undated) DEVICE — SPONGE LAP W18XL18IN WHT COT 4 PLY FLD STRUNG RADPQ DISP ST 2 PER PACK

## (undated) DEVICE — 3M™ STERI-DRAPE™ U-DRAPE 1015: Brand: STERI-DRAPE™

## (undated) DEVICE — GLOVE SURG SZ 8 CRM LTX FREE POLYISOPRENE POLYMER BEAD ANTI

## (undated) DEVICE — HANDPIECE SET WITH COAXIAL HIGH FLOW TIP AND SUCTION TUBE: Brand: INTERPULSE

## (undated) DEVICE — BLADE SURG SAW S STL NAR OSC W/ SERR EDGE DISP

## (undated) DEVICE — GLOVE ORANGE PI 8   MSG9080

## (undated) DEVICE — DRAPE,REIN 53X77,STERILE: Brand: MEDLINE

## (undated) DEVICE — GOWN,SIRUS,FABRNF,XL,20/CS: Brand: MEDLINE

## (undated) DEVICE — PIN BNE FIX TEMP L140MM DIA4MM MAKO

## (undated) DEVICE — GLOVE ORTHO 8   MSG9480

## (undated) DEVICE — BOOT POS LEG DEMAYO

## (undated) DEVICE — KIT TRK KNEE PROC VIZADISC

## (undated) DEVICE — TOWEL,OR,DSP,ST,BLUE,STD,6/PK,12PK/CS: Brand: MEDLINE

## (undated) DEVICE — STRYKER PERFORMANCE SERIES SAGITTAL BLADE: Brand: STRYKER PERFORMANCE SERIES

## (undated) DEVICE — DOUBLE BASIN SET: Brand: MEDLINE INDUSTRIES, INC.

## (undated) DEVICE — KIT SURG W7XL11IN 2 PKT UNTREATED NA

## (undated) DEVICE — APPLICATOR MEDICATED 26 CC SOLUTION HI LT ORNG CHLORAPREP

## (undated) DEVICE — BNDG,ELSTC,MATRIX,STRL,6"X5YD,LF,HOOK&LP: Brand: MEDLINE

## (undated) DEVICE — TOTAL KNEE PK

## (undated) DEVICE — CORD RETRCT SIL

## (undated) DEVICE — KIT DRP FOR RIO ROBOTIC ARM ASST SYS

## (undated) DEVICE — GLOVE SURG SZ 8 L12IN FNGR THK79MIL GRN LTX FREE

## (undated) DEVICE — LIQUIBAND RAPID ADHESIVE 36/CS 0.8ML: Brand: MEDLINE

## (undated) DEVICE — ELECTRODE PT RET AD L9FT HI MOIST COND ADH HYDRGEL CORDED

## (undated) DEVICE — TAPE ADH W3INXL10YD WHT COT WVN BK POWERFUL RUB BASE HIGHLY

## (undated) DEVICE — NEEDLE HYPO 18GA L1.5IN PNK POLYPR HUB S STL REG BVL STR